# Patient Record
Sex: FEMALE | Race: WHITE | NOT HISPANIC OR LATINO | Employment: FULL TIME | URBAN - NONMETROPOLITAN AREA
[De-identification: names, ages, dates, MRNs, and addresses within clinical notes are randomized per-mention and may not be internally consistent; named-entity substitution may affect disease eponyms.]

---

## 2017-03-06 ENCOUNTER — OFFICE VISIT (OUTPATIENT)
Dept: RETAIL CLINIC | Facility: CLINIC | Age: 32
End: 2017-03-06

## 2017-03-06 VITALS
WEIGHT: 219 LBS | TEMPERATURE: 98.5 F | DIASTOLIC BLOOD PRESSURE: 92 MMHG | OXYGEN SATURATION: 98 % | SYSTOLIC BLOOD PRESSURE: 130 MMHG | HEART RATE: 94 BPM | RESPIRATION RATE: 18 BRPM

## 2017-03-06 DIAGNOSIS — J02.9 ACUTE PHARYNGITIS, UNSPECIFIED ETIOLOGY: Primary | ICD-10-CM

## 2017-03-06 LAB
EXPIRATION DATE: NORMAL
INTERNAL CONTROL: NORMAL
Lab: NORMAL
S PYO AG THROAT QL: NEGATIVE

## 2017-03-06 PROCEDURE — 99213 OFFICE O/P EST LOW 20 MIN: CPT | Performed by: NURSE PRACTITIONER

## 2017-03-06 PROCEDURE — 87880 STREP A ASSAY W/OPTIC: CPT | Performed by: NURSE PRACTITIONER

## 2017-03-06 RX ORDER — AMOXICILLIN 875 MG/1
875 TABLET, COATED ORAL 2 TIMES DAILY
Qty: 20 TABLET | Refills: 0 | Status: SHIPPED | OUTPATIENT
Start: 2017-03-06 | End: 2017-03-16

## 2017-03-06 NOTE — PATIENT INSTRUCTIONS
Sore Throat  A sore throat is pain, burning, irritation, or scratchiness of the throat. There is often pain or tenderness when swallowing or talking. A sore throat may be accompanied by other symptoms, such as coughing, sneezing, fever, and swollen neck glands. A sore throat is often the first sign of another sickness, such as a cold, flu, strep throat, or mononucleosis (commonly known as mono). Most sore throats go away without medical treatment.  CAUSES   The most common causes of a sore throat include:  · A viral infection, such as a cold, flu, or mono.  · A bacterial infection, such as strep throat, tonsillitis, or whooping cough.  · Seasonal allergies.  · Dryness in the air.  · Irritants, such as smoke or pollution.  · Gastroesophageal reflux disease (GERD).  HOME CARE INSTRUCTIONS   · Only take over-the-counter medicines as directed by your caregiver.  · Drink enough fluids to keep your urine clear or pale yellow.  · Rest as needed.  · Try using throat sprays, lozenges, or sucking on hard candy to ease any pain (if older than 4 years or as directed).  · Sip warm liquids, such as broth, herbal tea, or warm water with honey to relieve pain temporarily. You may also eat or drink cold or frozen liquids such as frozen ice pops.  · Gargle with salt water (mix 1 tsp salt with 8 oz of water).  · Do not smoke and avoid secondhand smoke.  · Put a cool-mist humidifier in your bedroom at night to moisten the air. You can also turn on a hot shower and sit in the bathroom with the door closed for 5-10 minutes.  SEEK IMMEDIATE MEDICAL CARE IF:  · You have difficulty breathing.  · You are unable to swallow fluids, soft foods, or your saliva.  · You have increased swelling in the throat.  · Your sore throat does not get better in 7 days.  · You have nausea and vomiting.  · You have a fever or persistent symptoms for more than 2-3 days.  · You have a fever and your symptoms suddenly get worse.  MAKE SURE YOU:   · Understand  these instructions.  · Will watch your condition.  · Will get help right away if you are not doing well or get worse.     This information is not intended to replace advice given to you by your health care provider. Make sure you discuss any questions you have with your health care provider.     Document Released: 01/25/2006 Document Revised: 01/08/2016 Document Reviewed: 10/07/2016  [employer] Patient Education ©2016 [employer]

## 2017-03-06 NOTE — PROGRESS NOTES
Karla Plunkett is a 31 y.o. female who presents to the clinic with:      Sore Throat    This is a new problem. The current episode started in the past 7 days. The problem has been gradually worsening. There has been no fever. Associated symptoms include headaches and vomiting. Pertinent negatives include no abdominal pain, congestion, coughing, diarrhea, ear discharge, ear pain or shortness of breath. She has had exposure to strep. She has had no exposure to mono. She has tried nothing for the symptoms.   Vomiting    This is a new problem. The current episode started in the past 7 days. The problem occurs less than 2 times per day. The problem has been resolved. Associated symptoms include headaches. Pertinent negatives include no abdominal pain, coughing or diarrhea.          The following portions of the patient's history were reviewed and updated as appropriate: allergies, current medications, past family history, past medical history, past social history, past surgical history and problem list.       Review of Systems   Constitutional: Negative.    HENT: Positive for sore throat. Negative for congestion, ear discharge, ear pain, postnasal drip and sinus pressure.    Eyes: Negative.    Respiratory: Negative for cough and shortness of breath.    Cardiovascular: Negative.    Gastrointestinal: Positive for vomiting. Negative for abdominal pain and diarrhea.   Endocrine: Negative.    Genitourinary: Negative.    Musculoskeletal: Negative.    Skin: Negative.    Allergic/Immunologic: Negative.    Neurological: Positive for headaches.   Hematological: Negative.    Psychiatric/Behavioral: Negative.          Objective    Blood pressure 130/92, pulse 94, temperature 98.5 °F (36.9 °C), temperature source Temporal Artery , resp. rate 18, weight 219 lb (99.3 kg), last menstrual period 03/04/2017, SpO2 98 %.      Physical Exam   Constitutional: She is oriented to person, place, and time. She appears well-developed  and well-nourished.   HENT:   Right Ear: External ear and ear canal normal. Tympanic membrane is not erythematous. No middle ear effusion.   Left Ear: External ear and ear canal normal. Tympanic membrane is not erythematous.  No middle ear effusion.   Nose: Right sinus exhibits no maxillary sinus tenderness and no frontal sinus tenderness. Left sinus exhibits no maxillary sinus tenderness and no frontal sinus tenderness.   Mouth/Throat: Uvula is midline. Oropharyngeal exudate and posterior oropharyngeal erythema present.   Eyes: Conjunctivae and lids are normal. Right eye exhibits no discharge. Left eye exhibits no discharge.   Cardiovascular: Normal rate, regular rhythm and normal heart sounds.    Pulmonary/Chest: Breath sounds normal. No respiratory distress. She has no decreased breath sounds. She has no wheezes. She has no rhonchi. She has no rales.   Abdominal: Soft. Normal appearance and bowel sounds are normal.   Lymphadenopathy:        Head (right side): No tonsillar, no preauricular and no posterior auricular adenopathy present.        Head (left side): No tonsillar, no preauricular and no posterior auricular adenopathy present.     She has no cervical adenopathy.   Neurological: She is alert and oriented to person, place, and time.   Skin: Skin is warm and dry.   Psychiatric: She has a normal mood and affect.         Assessment/Plan   Lottie was seen today for sore throat and vomiting.    Diagnoses and all orders for this visit:    Acute pharyngitis, unspecified etiology  -     POCT rapid strep A  -     amoxicillin (AMOXIL) 875 MG tablet; Take 1 tablet by mouth 2 (Two) Times a Day for 10 days.

## 2018-01-01 ENCOUNTER — OFFICE VISIT (OUTPATIENT)
Dept: RETAIL CLINIC | Facility: CLINIC | Age: 33
End: 2018-01-01

## 2018-01-01 VITALS
HEART RATE: 93 BPM | WEIGHT: 228.6 LBS | RESPIRATION RATE: 18 BRPM | BODY MASS INDEX: 40.5 KG/M2 | HEIGHT: 63 IN | SYSTOLIC BLOOD PRESSURE: 122 MMHG | OXYGEN SATURATION: 98 % | DIASTOLIC BLOOD PRESSURE: 88 MMHG | TEMPERATURE: 98.3 F

## 2018-01-01 DIAGNOSIS — R21 RASH OF FACE: ICD-10-CM

## 2018-01-01 DIAGNOSIS — J02.9 ACUTE PHARYNGITIS, UNSPECIFIED ETIOLOGY: Primary | ICD-10-CM

## 2018-01-01 DIAGNOSIS — J06.9 ACUTE URI: ICD-10-CM

## 2018-01-01 LAB
EXPIRATION DATE: NORMAL
INTERNAL CONTROL: NORMAL
Lab: NORMAL
S PYO AG THROAT QL: NEGATIVE

## 2018-01-01 PROCEDURE — 96372 THER/PROPH/DIAG INJ SC/IM: CPT | Performed by: NURSE PRACTITIONER

## 2018-01-01 PROCEDURE — 87880 STREP A ASSAY W/OPTIC: CPT | Performed by: NURSE PRACTITIONER

## 2018-01-01 PROCEDURE — 99213 OFFICE O/P EST LOW 20 MIN: CPT | Performed by: NURSE PRACTITIONER

## 2018-01-01 RX ORDER — DEXAMETHASONE SODIUM PHOSPHATE 4 MG/ML
8 INJECTION, SOLUTION INTRA-ARTICULAR; INTRALESIONAL; INTRAMUSCULAR; INTRAVENOUS; SOFT TISSUE ONCE
Status: COMPLETED | OUTPATIENT
Start: 2018-01-01 | End: 2018-01-01

## 2018-01-01 RX ADMIN — DEXAMETHASONE SODIUM PHOSPHATE 8 MG: 4 INJECTION, SOLUTION INTRA-ARTICULAR; INTRALESIONAL; INTRAMUSCULAR; INTRAVENOUS; SOFT TISSUE at 10:45

## 2018-01-01 NOTE — PROGRESS NOTES
Chief Complaint   Patient presents with   • Sinus Problem   • Sore Throat     Subjective   Lottie Plunkett is a 32 y.o. female who presents to the clinic today with complaints of sore throat and pain with swallowing. She also has bilateral ear pain and the right one feels muffled.  She has had a frontal headache.  Her symptoms started about five days ago. She thought maybe she had an allergic reaction. She noted some itchy eyes which improved and fine rash to her face. She denies any new products, meds, etc.  She has tried taking Mucinex Fast Max, Benadryl, Theraflu, Tylenol, Naproxen, Dayquil and Nyquil without much benefit.   HPI    Current Outpatient Prescriptions:   •  ACETAMINOPHEN PO, Take  by mouth As Needed (last taken at 10 p.m. on last night)., Disp: , Rfl:   No current facility-administered medications for this visit.     Allergies:  Review of patient's allergies indicates no known allergies.    Past Medical History:   Diagnosis Date   • Allergic    • Anxiety    • Arthritis    • Chronic pain disorder     TWO BACK SURGERY'S   • H/O streptococcal infection    • Urinary tract infection      Past Surgical History:   Procedure Laterality Date   •  SECTION  2003   • SPINE SURGERY      Lumbar Laminectomy (12), Lumbar Disc Replacement (2016)     Family History   Problem Relation Age of Onset   • Obesity Mother    • Cancer Father      Social History   Substance Use Topics   • Smoking status: Current Every Day Smoker     Packs/day: 0.50     Years: 11.00     Types: Cigarettes   • Smokeless tobacco: Never Used   • Alcohol use 1.2 oz/week     2 Cans of beer per week      Comment: 1-2 DAILY       Review of Systems  Review of Systems   Constitutional: Negative for chills and fever.   HENT: Positive for ear pain (bilateral), postnasal drip and sore throat. Negative for rhinorrhea, sinus pain, sneezing and trouble swallowing.    Eyes: Positive for itching. Negative for photophobia, pain,  "discharge, redness and visual disturbance.   Respiratory: Negative for cough, shortness of breath and wheezing.    Gastrointestinal: Positive for vomiting (first thing in the morning, mucous). Negative for abdominal pain, constipation, diarrhea and nausea.   Musculoskeletal: Negative for arthralgias.   Skin: Positive for rash.   Neurological: Positive for headaches.       Objective   /88 (BP Location: Left arm, Patient Position: Sitting, Cuff Size: Adult)  Pulse 93  Temp 98.3 °F (36.8 °C) (Oral)   Resp 18  Ht 160 cm (63\")  Wt 104 kg (228 lb 9.6 oz)  LMP 12/25/2017 (Approximate)  SpO2 98%  BMI 40.49 kg/m2      Physical Exam   Constitutional: She is oriented to person, place, and time. She appears well-developed and well-nourished. She is cooperative. She appears ill (mildly). No distress.   HENT:   Head: Normocephalic and atraumatic.   Right Ear: Tympanic membrane, external ear and ear canal normal. Tympanic membrane is not perforated, not erythematous, not retracted and not bulging.   Left Ear: Tympanic membrane, external ear and ear canal normal. Tympanic membrane is not perforated, not erythematous, not retracted and not bulging.   Nose: Nose normal. Right sinus exhibits no maxillary sinus tenderness and no frontal sinus tenderness. Left sinus exhibits no maxillary sinus tenderness and no frontal sinus tenderness.   Mouth/Throat: Uvula is midline and mucous membranes are normal. Posterior oropharyngeal erythema present. Tonsils are 2+ on the right. Tonsils are 2+ on the left. No tonsillar exudate.   Eyes: Conjunctivae, EOM and lids are normal. Pupils are equal, round, and reactive to light.   Neck: Trachea normal and normal range of motion. Neck supple.   Cardiovascular: Normal rate, regular rhythm, S1 normal, S2 normal and normal heart sounds.    Pulmonary/Chest: Effort normal and breath sounds normal. She has no wheezes. She has no rhonchi. She has no rales.   Abdominal: Normal appearance and bowel " "sounds are normal. There is no tenderness.   Lymphadenopathy:     She has cervical adenopathy (soft mobile tender bilateral anterior nodes palpable).   Neurological: She is alert and oriented to person, place, and time. Coordination and gait normal.   Skin: Skin is warm, dry and intact. Rash (fine tiny erythemic papules noted to face cheeks near nose bilaterally. No s/s of infection) noted.   Psychiatric: She has a normal mood and affect. Her speech is normal and behavior is normal.       Assessment/Plan     Lottie was seen today for sinus problem and sore throat.    Diagnoses and all orders for this visit:    Acute pharyngitis, unspecified etiology  -     POC Rapid Strep A- negative  -     dexamethasone (DECADRON) injection 8 mg; Inject 2 mL into the shoulder, thigh, or buttocks 1 (One) Time.    Acute URI    Rash of face  -     dexamethasone (DECADRON) injection 8 mg; Inject 2 mL into the shoulder, thigh, or buttocks 1 (One) Time.    \"Wait and see\" prescription for Amoxicillin 875mg po bid x 10 days given. She agreed to follow instructions.  Continue Mucinex and Tylenol as needed. Increase fluid intake. Risks and benefits of steroid injection discussed prior to administration.  You report having a Medrol dose pack at home that you can start taking tomorrow if needed as per package directions. (You declined to have a new prescription today.)     Follow up with PCP or urgent care if symptoms persist or worsen.   "

## 2018-01-01 NOTE — PATIENT INSTRUCTIONS
Pharyngitis  Pharyngitis is redness, pain, and swelling (inflammation) of your pharynx.   CAUSES   Pharyngitis is usually caused by infection. Most of the time, these infections are from viruses (viral) and are part of a cold. However, sometimes pharyngitis is caused by bacteria (bacterial). Pharyngitis can also be caused by allergies. Viral pharyngitis may be spread from person to person by coughing, sneezing, and personal items or utensils (cups, forks, spoons, toothbrushes). Bacterial pharyngitis may be spread from person to person by more intimate contact, such as kissing.   SIGNS AND SYMPTOMS   Symptoms of pharyngitis include:    · Sore throat.    · Tiredness (fatigue).    · Low-grade fever.    · Headache.  · Joint pain and muscle aches.  · Skin rashes.  · Swollen lymph nodes.  · Plaque-like film on throat or tonsils (often seen with bacterial pharyngitis).  DIAGNOSIS   Your health care provider will ask you questions about your illness and your symptoms. Your medical history, along with a physical exam, is often all that is needed to diagnose pharyngitis. Sometimes, a rapid strep test is done. Other lab tests may also be done, depending on the suspected cause.   TREATMENT   Viral pharyngitis will usually get better in 3-4 days without the use of medicine. Bacterial pharyngitis is treated with medicines that kill germs (antibiotics).   HOME CARE INSTRUCTIONS   · Drink enough water and fluids to keep your urine clear or pale yellow.    · Only take over-the-counter or prescription medicines as directed by your health care provider:      If you are prescribed antibiotics, make sure you finish them even if you start to feel better.      Do not take aspirin.    · Get lots of rest.    · Gargle with 8 oz of salt water (½ tsp of salt per 1 qt of water) as often as every 1-2 hours to soothe your throat.    · Throat lozenges (if you are not at risk for choking) or sprays may be used to soothe your throat.  SEEK MEDICAL  "CARE IF:   · You have large, tender lumps in your neck.  · You have a rash.  · You cough up green, yellow-brown, or bloody spit.  SEEK IMMEDIATE MEDICAL CARE IF:   · Your neck becomes stiff.  · You drool or are unable to swallow liquids.  · You vomit or are unable to keep medicines or liquids down.  · You have severe pain that does not go away with the use of recommended medicines.  · You have trouble breathing (not caused by a stuffy nose).  MAKE SURE YOU:   · Understand these instructions.  · Will watch your condition.  · Will get help right away if you are not doing well or get worse.     This information is not intended to replace advice given to you by your health care provider. Make sure you discuss any questions you have with your health care provider.     Document Released: 12/18/2006 Document Revised: 10/08/2014 Document Reviewed: 08/25/2014  [employer] Interactive Patient Education ©2017 [employer]      Upper Respiratory Infection, Adult  Most upper respiratory infections (URIs) are a viral infection of the air passages leading to the lungs. A URI affects the nose, throat, and upper air passages. The most common type of URI is nasopharyngitis and is typically referred to as \"the common cold.\"  URIs run their course and usually go away on their own. Most of the time, a URI does not require medical attention, but sometimes a bacterial infection in the upper airways can follow a viral infection. This is called a secondary infection. Sinus and middle ear infections are common types of secondary upper respiratory infections.  Bacterial pneumonia can also complicate a URI. A URI can worsen asthma and chronic obstructive pulmonary disease (COPD). Sometimes, these complications can require emergency medical care and may be life threatening.   CAUSES  Almost all URIs are caused by viruses. A virus is a type of germ and can spread from one person to another.   RISKS FACTORS  You may be at risk for a URI if:   · You " smoke.    · You have chronic heart or lung disease.  · You have a weakened defense (immune) system.    · You are very young or very old.    · You have nasal allergies or asthma.  · You work in crowded or poorly ventilated areas.  · You work in health care facilities or schools.  SIGNS AND SYMPTOMS   Symptoms typically develop 2-3 days after you come in contact with a cold virus. Most viral URIs last 7-10 days. However, viral URIs from the influenza virus (flu virus) can last 14-18 days and are typically more severe. Symptoms may include:   · Runny or stuffy (congested) nose.    · Sneezing.    · Cough.    · Sore throat.    · Headache.    · Fatigue.    · Fever.    · Loss of appetite.    · Pain in your forehead, behind your eyes, and over your cheekbones (sinus pain).  · Muscle aches.    DIAGNOSIS   Your health care provider may diagnose a URI by:  · Physical exam.  · Tests to check that your symptoms are not due to another condition such as:  ¨ Strep throat.  ¨ Sinusitis.  ¨ Pneumonia.  ¨ Asthma.  TREATMENT   A URI goes away on its own with time. It cannot be cured with medicines, but medicines may be prescribed or recommended to relieve symptoms. Medicines may help:  · Reduce your fever.  · Reduce your cough.  · Relieve nasal congestion.  HOME CARE INSTRUCTIONS   · Take medicines only as directed by your health care provider.    · Gargle warm saltwater or take cough drops to comfort your throat as directed by your health care provider.  · Use a warm mist humidifier or inhale steam from a shower to increase air moisture. This may make it easier to breathe.  · Drink enough fluid to keep your urine clear or pale yellow.    · Eat soups and other clear broths and maintain good nutrition.    · Rest as needed.    · Return to work when your temperature has returned to normal or as your health care provider advises. You may need to stay home longer to avoid infecting others. You can also use a face mask and careful hand  washing to prevent spread of the virus.  · Increase the usage of your inhaler if you have asthma.    · Do not use any tobacco products, including cigarettes, chewing tobacco, or electronic cigarettes. If you need help quitting, ask your health care provider.  PREVENTION   The best way to protect yourself from getting a cold is to practice good hygiene.   · Avoid oral or hand contact with people with cold symptoms.    · Wash your hands often if contact occurs.    There is no clear evidence that vitamin C, vitamin E, echinacea, or exercise reduces the chance of developing a cold. However, it is always recommended to get plenty of rest, exercise, and practice good nutrition.   SEEK MEDICAL CARE IF:   · You are getting worse rather than better.    · Your symptoms are not controlled by medicine.    · You have chills.  · You have worsening shortness of breath.  · You have brown or red mucus.  · You have yellow or brown nasal discharge.  · You have pain in your face, especially when you bend forward.  · You have a fever.  · You have swollen neck glands.  · You have pain while swallowing.  · You have white areas in the back of your throat.  SEEK IMMEDIATE MEDICAL CARE IF:   · You have severe or persistent:    Headache.    Ear pain.    Sinus pain.    Chest pain.  · You have chronic lung disease and any of the following:    Wheezing.    Prolonged cough.    Coughing up blood.    A change in your usual mucus.  · You have a stiff neck.  · You have changes in your:    Vision.    Hearing.    Thinking.    Mood.  MAKE SURE YOU:   · Understand these instructions.  · Will watch your condition.  · Will get help right away if you are not doing well or get worse.     This information is not intended to replace advice given to you by your health care provider. Make sure you discuss any questions you have with your health care provider.     Document Released: 06/13/2002 Document Revised: 05/03/2016 Document Reviewed: 03/25/2015  [employer]  Interactive Patient Education ©2017 [employer]      Rash  A rash is a change in the color of the skin. A rash can also change the way your skin feels. There are many different conditions and factors that can cause a rash.  HOME CARE  Pay attention to any changes in your symptoms. Follow these instructions to help with your condition:  Medicine   Take or apply over-the-counter and prescription medicines only as told by your doctor. These may include:  · Corticosteroid cream.  · Anti-itch lotions.  · Oral antihistamines.  Skin Care   · Put cool compresses on the affected areas.  · Try taking a bath with:    Epsom salts. Follow the instructions on the packaging. You can get these at your local pharmacy or grocery store.    Baking soda. Pour a small amount into the bath as told by your doctor.    Colloidal oatmeal. Follow the instructions on the packaging. You can get this at your local pharmacy or grocery store.  · Try putting baking soda paste onto your skin. Stir water into baking soda until it gets like a paste.  · Do not scratch or rub your skin.  · Avoid covering the rash. Make sure the rash is exposed to air as much as possible.  General Instructions   · Avoid hot showers or baths, which can make itching worse. A cold shower may help.  · Avoid scented soaps, detergents, and perfumes. Use gentle soaps, detergents, perfumes, and other cosmetic products.  · Avoid anything that causes your rash. Keep a journal to help track what causes your rash. Write down:    What you eat.    What cosmetic products you use.    What you drink.    What you wear. This includes jewelry.  · Keep all follow-up visits as told by your doctor. This is important.  GET HELP IF:  · You sweat at night.  · You lose weight.  · You pee (urinate) more than normal.  · You feel weak.  · You throw up (vomit).  · Your skin or the whites of your eyes look yellow (jaundice).  · Your skin:    Tingles.    Is numb.  · Your rash:    Does not go away after  "a few days.    Gets worse.  · You are:    More thirsty than normal.    More tired than normal.  · You have:    New symptoms.    Pain in your belly (abdomen).    A fever.    Watery poop (diarrhea).  GET HELP RIGHT AWAY IF:  · Your rash covers all or most of your body. The rash may or may not be painful.  · You have blisters that:    Are on top of the rash.    Grow larger.    Grow together.    Are painful.    Are inside your nose or mouth.  · You have a rash that:    Looks like purple pinprick-sized spots all over your body.    Has a \"bull's eye\" or looks like a target.    Is red and painful, causes your skin to peel, and is not from being in the sun too long.     This information is not intended to replace advice given to you by your health care provider. Make sure you discuss any questions you have with your health care provider.     Document Released: 06/05/2009 Document Revised: 04/10/2017 Document Reviewed: 05/04/2016  [employer] Interactive Patient Education ©2017 [employer]    Dexamethasone injection  What is this medicine?  DEXAMETHASONE (dex a METH a sone) is a corticosteroid. It is used to treat inflammation of the skin, joints, lungs, and other organs. Common conditions treated include asthma, allergies, and arthritis. It is also used for other conditions, like blood disorders and diseases of the adrenal glands.  This medicine may be used for other purposes; ask your health care provider or pharmacist if you have questions.  COMMON BRAND NAME(S): Decadron, DoubleDex, Simplist Dexamethasone, Solurex  What should I tell my health care provider before I take this medicine?  They need to know if you have any of these conditions:  -blood clotting problems  -Cushing's syndrome  -diabetes  -glaucoma  -heart problems or disease  -high blood pressure  -infection like herpes, measles, tuberculosis, or chickenpox  -kidney disease  -liver disease  -mental problems  -myasthenia gravis  -osteoporosis  -previous heart " attack  -seizures  -stomach, ulcer or intestine disease including colitis and diverticulitis  -thyroid problem  -an unusual or allergic reaction to dexamethasone, corticosteroids, other medicines, lactose, foods, dyes, or preservatives  -pregnant or trying to get pregnant  -breast-feeding  How should I use this medicine?  This medicine is for injection into a muscle, joint, lesion, soft tissue, or vein. It is given by a health care professional in a hospital or clinic setting.  Talk to your pediatrician regarding the use of this medicine in children. Special care may be needed.  Overdosage: If you think you have taken too much of this medicine contact a poison control center or emergency room at once.  NOTE: This medicine is only for you. Do not share this medicine with others.  What if I miss a dose?  This may not apply. If you are having a series of injections over a prolonged period, try not to miss an appointment. Call your doctor or health care professional to reschedule if you are unable to keep an appointment.  What may interact with this medicine?  Do not take this medicine with any of the following medications:  -mifepristone, RU-486  -vaccines  This medicine may also interact with the following medications:  -amphotericin B  -antibiotics like clarithromycin, erythromycin, and troleandomycin  -aspirin and aspirin-like drugs  -barbiturates like phenobarbital  -carbamazepine  -cholestyramine  -cholinesterase inhibitors like donepezil, galantamine, rivastigmine, and tacrine  -cyclosporine  -digoxin  -diuretics  -ephedrine  -female hormones, like estrogens or progestins and birth control pills  -indinavir  -isoniazid  -ketoconazole  -medicines for diabetes  -medicines that improve muscle tone or strength for conditions like myasthenia gravis  -NSAIDs, medicines for pain and inflammation, like ibuprofen or naproxen  -phenytoin  -rifampin  -thalidomide  -warfarin  This list may not describe all possible  interactions. Give your health care provider a list of all the medicines, herbs, non-prescription drugs, or dietary supplements you use. Also tell them if you smoke, drink alcohol, or use illegal drugs. Some items may interact with your medicine.  What should I watch for while using this medicine?  Your condition will be monitored carefully while you are receiving this medicine.  If you are taking this medicine for a long time, carry an identification card with your name and address, the type and dose of your medicine, and your doctor's name and address.  This medicine may increase your risk of getting an infection. Stay away from people who are sick. Tell your doctor or health care professional if you are around anyone with measles or chickenpox. Talk to your health care provider before you get any vaccines that you take this medicine.  If you are going to have surgery, tell your doctor or health care professional that you have taken this medicine within the last twelve months.  Ask your doctor or health care professional about your diet. You may need to lower the amount of salt you eat.  The medicine can increase your blood sugar. If you are a diabetic check with your doctor if you need help adjusting the dose of your diabetic medicine.  What side effects may I notice from receiving this medicine?  Side effects that you should report to your doctor or health care professional as soon as possible:  -allergic reactions like skin rash, itching or hives, swelling of the face, lips, or tongue  -black or tarry stools  -change in the amount of urine  -changes in vision  -confusion, excitement, restlessness, a false sense of well-being  -fever, sore throat, sneezing, cough, or other signs of infection, wounds that will not heal  -hallucinations  -increased thirst  -mental depression, mood swings, mistaken feelings of self importance or of being mistreated  -pain in hips, back, ribs, arms, shoulders, or legs  -pain,  redness, or irritation at the injection site  -redness, blistering, peeling or loosening of the skin, including inside the mouth  -rounding out of face  -swelling of feet or lower legs  -unusual bleeding or bruising  -unusual tired or weak  -wounds that do not heal  Side effects that usually do not require medical attention (report to your doctor or health care professional if they continue or are bothersome):  -diarrhea or constipation  -change in taste  -headache  -nausea, vomiting  -skin problems, acne, thin and shiny skin  -touble sleeping  -unusual growth of hair on the face or body  -weight gain  This list may not describe all possible side effects. Call your doctor for medical advice about side effects. You may report side effects to FDA at 6-014-FDA-2677.  Where should I keep my medicine?  This drug is given in a hospital or clinic and will not be stored at home.  NOTE: This sheet is a summary. It may not cover all possible information. If you have questions about this medicine, talk to your doctor, pharmacist, or health care provider.     © 2017, [employer]. (2009-04-09 14:04:12)    Continue Mucinex and Tylenol as needed. Increase fluid intake. Risks and benefits of steroid injection discussed prior to administration.  You report having a Medrol dose pack at home that you can start taking tomorrow if needed as per package directions. (You declined to have a new prescription today.)  Based on your examination today you are being given a paper prescription for an antibiotic. You have agreed to follow instructions as discussed for self-care at home.  If your symptoms do not improve or if an increase in symptoms occurs over next 48-72 hours, you are to start the antibiotic.  If you are significantly worse you will need to be seen by a healthcare provider.     If you improve please shred the prescription and throw it away.  Do not save it for another illness.  Antibiotics are medications that need to  be taken as directed, for the length of time directed and only if prescribed for a specific illness by a healthcare provider.  Antibiotics fight bacterial infections only. They will not be effective in viral illnesses. Exposure to an antibiotic unnecessarily can make them ineffective when you need them in the future. You verbalized understanding of these instructions and agreed to follow them.

## 2018-01-09 ENCOUNTER — OFFICE VISIT (OUTPATIENT)
Dept: RETAIL CLINIC | Facility: CLINIC | Age: 33
End: 2018-01-09

## 2018-01-09 VITALS
BODY MASS INDEX: 40.39 KG/M2 | HEART RATE: 104 BPM | TEMPERATURE: 98 F | DIASTOLIC BLOOD PRESSURE: 88 MMHG | SYSTOLIC BLOOD PRESSURE: 122 MMHG | WEIGHT: 228 LBS | RESPIRATION RATE: 22 BRPM | OXYGEN SATURATION: 98 %

## 2018-01-09 DIAGNOSIS — J20.9 ACUTE BRONCHITIS, UNSPECIFIED ORGANISM: Primary | ICD-10-CM

## 2018-01-09 PROCEDURE — 99213 OFFICE O/P EST LOW 20 MIN: CPT | Performed by: NURSE PRACTITIONER

## 2018-01-09 RX ORDER — BROMPHENIRAMINE MALEATE, PSEUDOEPHEDRINE HYDROCHLORIDE, AND DEXTROMETHORPHAN HYDROBROMIDE 2; 30; 10 MG/5ML; MG/5ML; MG/5ML
10 SYRUP ORAL 4 TIMES DAILY PRN
Qty: 240 ML | Refills: 0 | Status: SHIPPED | OUTPATIENT
Start: 2018-01-09 | End: 2022-03-28

## 2018-01-09 RX ORDER — ALBUTEROL SULFATE 90 UG/1
2 AEROSOL, METERED RESPIRATORY (INHALATION) EVERY 4 HOURS PRN
Qty: 1 INHALER | Refills: 0 | Status: SHIPPED | OUTPATIENT
Start: 2018-01-09

## 2018-01-09 NOTE — PATIENT INSTRUCTIONS
Acute Bronchitis  Bronchitis is inflammation of the airways that extend from the windpipe into the lungs (bronchi). The inflammation often causes mucus to develop. This leads to a cough, which is the most common symptom of bronchitis.   In acute bronchitis, the condition usually develops suddenly and goes away over time, usually in a couple weeks. Smoking, allergies, and asthma can make bronchitis worse. Repeated episodes of bronchitis may cause further lung problems.   CAUSES  Acute bronchitis is most often caused by the same virus that causes a cold. The virus can spread from person to person (contagious) through coughing, sneezing, and touching contaminated objects.  SIGNS AND SYMPTOMS   · Cough.    · Fever.    · Coughing up mucus.    · Body aches.    · Chest congestion.    · Chills.    · Shortness of breath.    · Sore throat.    DIAGNOSIS   Acute bronchitis is usually diagnosed through a physical exam. Your health care provider will also ask you questions about your medical history. Tests, such as chest X-rays, are sometimes done to rule out other conditions.   TREATMENT   Acute bronchitis usually goes away in a couple weeks. Oftentimes, no medical treatment is necessary. Medicines are sometimes given for relief of fever or cough. Antibiotic medicines are usually not needed but may be prescribed in certain situations. In some cases, an inhaler may be recommended to help reduce shortness of breath and control the cough. A cool mist vaporizer may also be used to help thin bronchial secretions and make it easier to clear the chest.   HOME CARE INSTRUCTIONS  · Get plenty of rest.    · Drink enough fluids to keep your urine clear or pale yellow (unless you have a medical condition that requires fluid restriction). Increasing fluids may help thin your respiratory secretions (sputum) and reduce chest congestion, and it will prevent dehydration.    · Take medicines only as directed by your health care provider.  · If  you were prescribed an antibiotic medicine, finish it all even if you start to feel better.  · Avoid smoking and secondhand smoke. Exposure to cigarette smoke or irritating chemicals will make bronchitis worse. If you are a smoker, consider using nicotine gum or skin patches to help control withdrawal symptoms. Quitting smoking will help your lungs heal faster.    · Reduce the chances of another bout of acute bronchitis by washing your hands frequently, avoiding people with cold symptoms, and trying not to touch your hands to your mouth, nose, or eyes.    · Keep all follow-up visits as directed by your health care provider.    SEEK MEDICAL CARE IF:  Your symptoms do not improve after 1 week of treatment.   SEEK IMMEDIATE MEDICAL CARE IF:  · You develop an increased fever or chills.    · You have chest pain.    · You have severe shortness of breath.  · You have bloody sputum.    · You develop dehydration.  · You faint or repeatedly feel like you are going to pass out.  · You develop repeated vomiting.  · You develop a severe headache.  MAKE SURE YOU:   · Understand these instructions.  · Will watch your condition.  · Will get help right away if you are not doing well or get worse.     This information is not intended to replace advice given to you by your health care provider. Make sure you discuss any questions you have with your health care provider.     Document Released: 01/25/2006 Document Revised: 01/08/2016 Document Reviewed: 06/10/2014  [employer] Interactive Patient Education ©2017 [employer]    How to Use an Inhaler  Proper inhaler technique is very important. Good technique ensures that the medicine reaches the lungs. Poor technique results in depositing the medicine on the tongue and back of the throat rather than in the airways. If you do not use the inhaler with good technique, the medicine will not help you.  STEPS TO FOLLOW IF USING AN INHALER WITHOUT AN EXTENSION TUBE  1. Remove the cap from the  inhaler.  2. If you are using the inhaler for the first time, you will need to prime it. Shake the inhaler for 5 seconds and release four puffs into the air, away from your face. Ask your health care provider or pharmacist if you have questions about priming your inhaler.  3. Shake the inhaler for 5 seconds before each breath in (inhalation).  4. Position the inhaler so that the top of the canister faces up.  5. Put your index finger on the top of the medicine canister. Your thumb supports the bottom of the inhaler.  6. Open your mouth.  7. Either place the inhaler between your teeth and place your lips tightly around the mouthpiece, or hold the inhaler 1-2 inches away from your open mouth. If you are unsure of which technique to use, ask your health care provider.  8. Breathe out (exhale) normally and as completely as possible.  9. Press the canister down with your index finger to release the medicine.  10. At the same time as the canister is pressed, inhale deeply and slowly until your lungs are completely filled. This should take 4-6 seconds. Keep your tongue down.  11. Hold the medicine in your lungs for 5-10 seconds (10 seconds is best). This helps the medicine get into the small airways of your lungs.  12. Breathe out slowly, through pursed lips. Whistling is an example of pursed lips.  13. Wait at least 15-30 seconds between puffs. Continue with the above steps until you have taken the number of puffs your health care provider has ordered. Do not use the inhaler more than your health care provider tells you.  14. Replace the cap on the inhaler.  15. Follow the directions from your health care provider or the inhaler insert for cleaning the inhaler.  STEPS TO FOLLOW IF USING AN INHALER WITH AN EXTENSION (SPACER)  1. Remove the cap from the inhaler.  2. If you are using the inhaler for the first time, you will need to prime it. Shake the inhaler for 5 seconds and release four puffs into the air, away from your  face. Ask your health care provider or pharmacist if you have questions about priming your inhaler.  3. Shake the inhaler for 5 seconds before each breath in (inhalation).  4. Place the open end of the spacer onto the mouthpiece of the inhaler.  5. Position the inhaler so that the top of the canister faces up and the spacer mouthpiece faces you.  6. Put your index finger on the top of the medicine canister. Your thumb supports the bottom of the inhaler and the spacer.  7. Breathe out (exhale) normally and as completely as possible.  8. Immediately after exhaling, place the spacer between your teeth and into your mouth. Close your lips tightly around the spacer.  9. Press the canister down with your index finger to release the medicine.  10. At the same time as the canister is pressed, inhale deeply and slowly until your lungs are completely filled. This should take 4-6 seconds. Keep your tongue down and out of the way.  11. Hold the medicine in your lungs for 5-10 seconds (10 seconds is best). This helps the medicine get into the small airways of your lungs. Exhale.  12. Repeat inhaling deeply through the spacer mouthpiece. Again hold that breath for up to 10 seconds (10 seconds is best). Exhale slowly. If it is difficult to take this second deep breath through the spacer, breathe normally several times through the spacer. Remove the spacer from your mouth.  13. Wait at least 15-30 seconds between puffs. Continue with the above steps until you have taken the number of puffs your health care provider has ordered. Do not use the inhaler more than your health care provider tells you.  14. Remove the spacer from the inhaler, and place the cap on the inhaler.  15. Follow the directions from your health care provider or the inhaler insert for cleaning the inhaler and spacer.  If you are using different kinds of inhalers, use your quick relief medicine to open the airways 10-15 minutes before using a steroid if instructed  to do so by your health care provider. If you are unsure which inhalers to use and the order of using them, ask your health care provider, nurse, or respiratory therapist.  If you are using a steroid inhaler, always rinse your mouth with water after your last puff, then gargle and spit out the water. Do not swallow the water.  AVOID:  · Inhaling before or after starting the spray of medicine. It takes practice to coordinate your breathing with triggering the spray.  · Inhaling through the nose (rather than the mouth) when triggering the spray.  HOW TO DETERMINE IF YOUR INHALER IS FULL OR NEARLY EMPTY  You cannot know when an inhaler is empty by shaking it. A few inhalers are now being made with dose counters. Ask your health care provider for a prescription that has a dose counter if you feel you need that extra help. If your inhaler does not have a counter, ask your health care provider to help you determine the date you need to refill your inhaler. Write the refill date on a calendar or your inhaler canister. Refill your inhaler 7-10 days before it runs out. Be sure to keep an adequate supply of medicine. This includes making sure it is not , and that you have a spare inhaler.   SEEK MEDICAL CARE IF:   · Your symptoms are only partially relieved with your inhaler.  · You are having trouble using your inhaler.  · You have some increase in phlegm.  SEEK IMMEDIATE MEDICAL CARE IF:   · You feel little or no relief with your inhalers. You are still wheezing and are feeling shortness of breath or tightness in your chest or both.  · You have dizziness, headaches, or a fast heart rate.  · You have chills, fever, or night sweats.  · You have a noticeable increase in phlegm production, or there is blood in the phlegm.  MAKE SURE YOU:   · Understand these instructions.  · Will watch your condition.  · Will get help right away if you are not doing well or get worse.     This information is not intended to replace  advice given to you by your health care provider. Make sure you discuss any questions you have with your health care provider.     Document Released: 12/15/2001 Document Revised: 04/10/2017 Document Reviewed: 07/17/2014  [employer]vier Interactive Patient Education ©2017 [employer]

## 2018-01-09 NOTE — PROGRESS NOTES
Subjective   Lottie Plunkett is a 32 y.o. female.     HPI Comments: Treated on 1/1/2018, and has now been on the just in case Rx for 5 days; throat is improved, but cough has started to break up and worsen.    URI    This is a new problem. The current episode started 1 to 4 weeks ago. The problem has been gradually worsening. There has been no fever. Associated symptoms include chest pain (hurts to cough), congestion, coughing, rhinorrhea and wheezing. Pertinent negatives include no diarrhea, headaches, nausea, neck pain, sinus pain, sore throat or vomiting. Treatments tried: Amoxicillin; dexamethasone injection, medrol dose pack. The treatment provided mild relief.        The following portions of the patient's history were reviewed and updated as appropriate: allergies, current medications, past family history, past medical history, past social history, past surgical history and problem list.    Review of Systems   Constitutional: Positive for fatigue. Negative for chills and fever.   HENT: Positive for congestion and rhinorrhea. Negative for sinus pain and sore throat.    Respiratory: Positive for cough, shortness of breath and wheezing.    Cardiovascular: Positive for chest pain (hurts to cough).   Gastrointestinal: Negative for diarrhea, nausea and vomiting.   Musculoskeletal: Negative for neck pain.   Neurological: Negative for dizziness and headaches.       Objective      .    Physical Exam   Constitutional: She is oriented to person, place, and time. She appears well-developed and well-nourished.   HENT:   Head: Normocephalic and atraumatic.   Right Ear: Hearing, tympanic membrane, external ear and ear canal normal.   Left Ear: Hearing, tympanic membrane, external ear and ear canal normal.   Nose: Mucosal edema present.   Mouth/Throat: Posterior oropharyngeal erythema present.   Eyes: Conjunctivae and EOM are normal. Pupils are equal, round, and reactive to light.   Neck: Normal range of motion. Neck supple.  No thyromegaly present.   Cardiovascular: Normal rate, regular rhythm and normal heart sounds.  Exam reveals no gallop and no friction rub.    No murmur heard.  Pulmonary/Chest: Effort normal. No respiratory distress. She has decreased breath sounds. She has wheezes. She has rales.   Abdominal: Soft. Bowel sounds are normal. There is no tenderness.   Musculoskeletal: Normal range of motion.   Lymphadenopathy:     She has no cervical adenopathy.   Neurological: She is alert and oriented to person, place, and time. No cranial nerve deficit.   Skin: Skin is warm and dry.   Psychiatric: She has a normal mood and affect. Her behavior is normal. Thought content normal.   Nursing note and vitals reviewed.      Assessment/Plan   Lottie was seen today for uri.    Diagnoses and all orders for this visit:    Acute bronchitis, unspecified organism    Other orders  -     brompheniramine-pseudoephedrine-DM 30-2-10 MG/5ML syrup; Take 10 mL by mouth 4 (Four) Times a Day As Needed for Congestion or Cough.  -     albuterol (PROVENTIL HFA;VENTOLIN HFA) 108 (90 Base) MCG/ACT inhaler; Inhale 2 puffs Every 4 (Four) Hours As Needed for Wheezing or Shortness of Air.    Complete antibiotics. If fever develops, seek higher level of care for further evaluation.      SEEK IMMEDIATE MEDICAL CARE IF:  · You have increasing pain or severe headaches.  · You have nausea, vomiting, or drowsiness.  · You have swelling around your face.  · You have vision problems.  · You have a stiff neck.  · You have difficulty breathing.    Adequate rest and hydration, warm facial packs, and steam inhalation may be useful. Saline irrigation (Neti pot) or nasal saline spray may help with clearing congestion within the sinuses. Sleep with head of bed elevated. Avoid exposure to cigarette smoke or fumes.  For worsening or persistent problems, follow up with your primary care provider.  You have voiced understanding of treatment plan, visit summary provided.        America Judd, APRN

## 2018-01-11 ENCOUNTER — HOSPITAL ENCOUNTER (OUTPATIENT)
Dept: GENERAL RADIOLOGY | Age: 33
Discharge: HOME | End: 2018-01-11
Payer: COMMERCIAL

## 2018-01-11 ENCOUNTER — OFFICE VISIT (OUTPATIENT)
Dept: URGENT CARE | Age: 33
End: 2018-01-11
Payer: COMMERCIAL

## 2018-01-11 VITALS
HEART RATE: 94 BPM | OXYGEN SATURATION: 98 % | TEMPERATURE: 97.5 F | DIASTOLIC BLOOD PRESSURE: 62 MMHG | RESPIRATION RATE: 22 BRPM | BODY MASS INDEX: 40.21 KG/M2 | WEIGHT: 227 LBS | SYSTOLIC BLOOD PRESSURE: 120 MMHG

## 2018-01-11 DIAGNOSIS — R06.2 WHEEZE: ICD-10-CM

## 2018-01-11 DIAGNOSIS — R05.3 PERSISTENT COUGH: ICD-10-CM

## 2018-01-11 DIAGNOSIS — R05.3 PERSISTENT COUGH: Primary | ICD-10-CM

## 2018-01-11 PROCEDURE — 1036F TOBACCO NON-USER: CPT | Performed by: PHYSICIAN ASSISTANT

## 2018-01-11 PROCEDURE — G8417 CALC BMI ABV UP PARAM F/U: HCPCS | Performed by: PHYSICIAN ASSISTANT

## 2018-01-11 PROCEDURE — 99202 OFFICE O/P NEW SF 15 MIN: CPT | Performed by: PHYSICIAN ASSISTANT

## 2018-01-11 PROCEDURE — G8484 FLU IMMUNIZE NO ADMIN: HCPCS | Performed by: PHYSICIAN ASSISTANT

## 2018-01-11 PROCEDURE — G8427 DOCREV CUR MEDS BY ELIG CLIN: HCPCS | Performed by: PHYSICIAN ASSISTANT

## 2018-01-11 PROCEDURE — 71046 X-RAY EXAM CHEST 2 VIEWS: CPT

## 2018-01-11 RX ORDER — DEXTROMETHORPHAN HYDROBROMIDE AND PROMETHAZINE HYDROCHLORIDE 15; 6.25 MG/5ML; MG/5ML
5 SYRUP ORAL 4 TIMES DAILY PRN
Qty: 240 ML | Refills: 0 | Status: SHIPPED | OUTPATIENT
Start: 2018-01-11 | End: 2018-01-18

## 2018-01-11 RX ORDER — ALBUTEROL SULFATE 90 UG/1
2 AEROSOL, METERED RESPIRATORY (INHALATION)
COMMUNITY
Start: 2018-01-09 | End: 2018-02-26

## 2018-01-11 RX ORDER — AMOXICILLIN 875 MG/1
TABLET, COATED ORAL
Refills: 0 | COMMUNITY
Start: 2018-01-06 | End: 2018-02-26

## 2018-01-11 RX ORDER — BENZONATATE 100 MG/1
100 CAPSULE ORAL 3 TIMES DAILY PRN
Qty: 30 CAPSULE | Refills: 1 | Status: SHIPPED | OUTPATIENT
Start: 2018-01-11 | End: 2018-01-21

## 2018-01-11 RX ORDER — BROMPHENIRAMINE MALEATE, PSEUDOEPHEDRINE HYDROCHLORIDE, AND DEXTROMETHORPHAN HYDROBROMIDE 2; 30; 10 MG/5ML; MG/5ML; MG/5ML
10 SYRUP ORAL
COMMUNITY
Start: 2018-01-09 | End: 2018-02-26

## 2018-01-11 ASSESSMENT — ENCOUNTER SYMPTOMS
DIARRHEA: 0
SORE THROAT: 1
RHINORRHEA: 1
COUGH: 1
VOMITING: 0
ABDOMINAL PAIN: 0
SHORTNESS OF BREATH: 1
NAUSEA: 0

## 2018-01-11 NOTE — PATIENT INSTRUCTIONS
notice more mucus or a change in the color of your mucus. ? · You have new symptoms, such as a sore throat, an earache, or sinus pain. ? · You do not get better as expected. Where can you learn more? Go to [email or web address] and sign in to your Jas[employer] account. Enter D279 in the Pollfish box to learn more about \"Cough: Care Instructions. \"     If you do not have an account, please click on the \"Sign Up Now\" link. Current as of: May 12, 2017  Content Version: 11.5  © 1264-4320 [employer]. Care instructions adapted under license by [facility] ([facility]). If you have questions about a medical condition or this instruction, always ask your healthcare professional. [address] any warranty or liability for your use of this information.

## 2018-02-26 ENCOUNTER — OFFICE VISIT (OUTPATIENT)
Dept: INTERNAL MEDICINE | Age: 33
End: 2018-02-26
Payer: COMMERCIAL

## 2018-02-26 VITALS
OXYGEN SATURATION: 98 % | SYSTOLIC BLOOD PRESSURE: 118 MMHG | BODY MASS INDEX: 41.11 KG/M2 | WEIGHT: 232 LBS | TEMPERATURE: 98.5 F | HEART RATE: 89 BPM | HEIGHT: 63 IN | DIASTOLIC BLOOD PRESSURE: 70 MMHG

## 2018-02-26 DIAGNOSIS — J30.9 CHRONIC ALLERGIC RHINITIS, UNSPECIFIED SEASONALITY, UNSPECIFIED TRIGGER: ICD-10-CM

## 2018-02-26 DIAGNOSIS — Z71.3 DIETARY COUNSELING: ICD-10-CM

## 2018-02-26 DIAGNOSIS — F41.9 ANXIETY: Chronic | ICD-10-CM

## 2018-02-26 DIAGNOSIS — Z76.89 ENCOUNTER TO ESTABLISH CARE: Primary | ICD-10-CM

## 2018-02-26 DIAGNOSIS — Z76.89 ENCOUNTER TO ESTABLISH CARE: ICD-10-CM

## 2018-02-26 DIAGNOSIS — J06.9 ACUTE UPPER RESPIRATORY INFECTION: ICD-10-CM

## 2018-02-26 LAB
ALBUMIN SERPL-MCNC: 4.2 G/DL (ref 3.5–5.2)
ALP BLD-CCNC: 71 U/L (ref 35–104)
ALT SERPL-CCNC: 18 U/L (ref 5–33)
ANION GAP SERPL CALCULATED.3IONS-SCNC: 14 MMOL/L (ref 7–19)
AST SERPL-CCNC: 12 U/L (ref 5–32)
BASOPHILS ABSOLUTE: 0.1 K/UL (ref 0–0.2)
BASOPHILS RELATIVE PERCENT: 0.9 % (ref 0–1)
BILIRUB SERPL-MCNC: <0.2 MG/DL (ref 0.2–1.2)
BUN BLDV-MCNC: 11 MG/DL (ref 6–20)
CALCIUM SERPL-MCNC: 9.3 MG/DL (ref 8.6–10)
CHLORIDE BLD-SCNC: 101 MMOL/L (ref 98–111)
CHOLESTEROL, TOTAL: 221 MG/DL (ref 160–199)
CO2: 25 MMOL/L (ref 22–29)
CREAT SERPL-MCNC: 0.6 MG/DL (ref 0.5–0.9)
EOSINOPHILS ABSOLUTE: 0.1 K/UL (ref 0–0.6)
EOSINOPHILS RELATIVE PERCENT: 1.2 % (ref 0–5)
GFR NON-AFRICAN AMERICAN: >60
GLUCOSE BLD-MCNC: 113 MG/DL (ref 74–109)
HBA1C MFR BLD: 5.7 %
HCT VFR BLD CALC: 42.4 % (ref 37–47)
HDLC SERPL-MCNC: 58 MG/DL (ref 65–121)
HEMOGLOBIN: 13.3 G/DL (ref 12–16)
LDL CHOLESTEROL CALCULATED: 130 MG/DL
LYMPHOCYTES ABSOLUTE: 1.9 K/UL (ref 1.1–4.5)
LYMPHOCYTES RELATIVE PERCENT: 22.3 % (ref 20–40)
MCH RBC QN AUTO: 28.2 PG (ref 27–31)
MCHC RBC AUTO-ENTMCNC: 31.4 G/DL (ref 33–37)
MCV RBC AUTO: 90 FL (ref 81–99)
MONOCYTES ABSOLUTE: 0.5 K/UL (ref 0–0.9)
MONOCYTES RELATIVE PERCENT: 6 % (ref 0–10)
NEUTROPHILS ABSOLUTE: 5.9 K/UL (ref 1.5–7.5)
NEUTROPHILS RELATIVE PERCENT: 69.2 % (ref 50–65)
PDW BLD-RTO: 13.7 % (ref 11.5–14.5)
PLATELET # BLD: 374 K/UL (ref 130–400)
PMV BLD AUTO: 9.3 FL (ref 9.4–12.3)
POTASSIUM SERPL-SCNC: 4.7 MMOL/L (ref 3.5–5)
RBC # BLD: 4.71 M/UL (ref 4.2–5.4)
SODIUM BLD-SCNC: 140 MMOL/L (ref 136–145)
TOTAL PROTEIN: 7.9 G/DL (ref 6.6–8.7)
TRIGL SERPL-MCNC: 164 MG/DL (ref 0–149)
TSH SERPL DL<=0.05 MIU/L-ACNC: 2.27 UIU/ML (ref 0.27–4.2)
VITAMIN D 25-HYDROXY: 13.7 NG/ML
WBC # BLD: 8.5 K/UL (ref 4.8–10.8)

## 2018-02-26 PROCEDURE — 1036F TOBACCO NON-USER: CPT | Performed by: NURSE PRACTITIONER

## 2018-02-26 PROCEDURE — G8484 FLU IMMUNIZE NO ADMIN: HCPCS | Performed by: NURSE PRACTITIONER

## 2018-02-26 PROCEDURE — G8417 CALC BMI ABV UP PARAM F/U: HCPCS | Performed by: NURSE PRACTITIONER

## 2018-02-26 PROCEDURE — G8427 DOCREV CUR MEDS BY ELIG CLIN: HCPCS | Performed by: NURSE PRACTITIONER

## 2018-02-26 PROCEDURE — 99214 OFFICE O/P EST MOD 30 MIN: CPT | Performed by: NURSE PRACTITIONER

## 2018-02-26 RX ORDER — AZITHROMYCIN 250 MG/1
TABLET, FILM COATED ORAL
Qty: 1 PACKET | Refills: 0 | Status: SHIPPED | OUTPATIENT
Start: 2018-02-26 | End: 2018-03-12 | Stop reason: ALTCHOICE

## 2018-02-26 RX ORDER — ESCITALOPRAM OXALATE 10 MG/1
10 TABLET ORAL DAILY
Qty: 30 TABLET | Refills: 3 | Status: SHIPPED | OUTPATIENT
Start: 2018-02-26 | End: 2018-03-26 | Stop reason: SINTOL

## 2018-02-26 RX ORDER — METHYLPREDNISOLONE 4 MG/1
TABLET ORAL
Qty: 1 KIT | Refills: 0 | Status: SHIPPED | OUTPATIENT
Start: 2018-02-26 | End: 2018-03-04

## 2018-02-26 NOTE — PATIENT INSTRUCTIONS
1. Labs today. 2. Begin Lexapro 10 mg daily. 3. Begin zpack and medrol dose pack as directed. 4. Healthy diet and exercise. 5. Follow-up in 1 month with PAP. 6. Begin daily antihistamine such as zyrtec or claritin; take PLAIN Mucinex (no DM) to help thin secretions.

## 2018-02-26 NOTE — PROGRESS NOTES
[facility] INTERNAL MEDICINE  [address]  [address] [place] [address]  Dept: [phone]  Dept Fax: [phone]  Loc: [phone]    Mario Turcios is a 34 y.o. female who presents today for her medical conditions/complaints as noted below. Mario Turcios is c/o of Establish Care (Has had congestion/cough/sore throat since end of December) and Anxiety (Has had anxiety/depression for years and weight gain and wants to get back on medication)        HPI:     HPI   Chief Complaint   Patient presents with   [employer] Establish Care     Has had congestion/cough/sore throat since end of December    Anxiety     Has had anxiety/depression for years and weight gain and wants to get back on medication     Patient presents today to [facility]. She states she has not had a PCP in 4-5 years; she typically uses walk-in clinics when she is sick. She has a history of anxiety, OCD and back pain. She complains of cough, congestion and sore throat x 2 months. She has been taking Mucinex for symptoms. Patient also complains of anxiety and depression for years as well as weight gain. She states she is not certain what she has been on in the past; she rotated through many different medications. She states she has been off all medications for mood disorder for >10 years. A few months ago she feels like she hit a \"rough patch\" where she had difficulty getting out of bed; she is depressed about her weight and has struggled with many different meal plans and diets and has not been able to lose weight. She has been running, doing yoga and swimming without success. She has to be careful about the kind of exercise she does due to her back pain. Back pain has been well-controlled since her last surgery. It has been > 5 years since last pap; patient is due for this.  She has seen Dr Viky Hernandez in the past.     Past Medical History:   Diagnosis Date    Anxiety     Chronic back pain     OCD (obsessive compulsive disorder)       Past Surgical History:   Procedure Laterality Date     SECTION      LUMBAR DISCECTOMY      LUMBAR LAMINECTOMY         Vitals 2018    SYSTOLIC 669 [id number] - - 006   DIASTOLIC 70 62 - - 87   Site Left Arm - - - -   Position Sitting - - - -   Pulse 89 94 - - 130   Temp 98.5 97.5 - - 98.1   Resp - 22 - - 17   Weight 232 lb 227 lb - - 215 lb   Height 5' 3\" - - - 5' 3\"   BMI (wt*703/ht~2) 41.09 kg/m2 - - - 38.08 kg/m2   Pain Level - - 7 10 -       History reviewed. No pertinent family history. Social History   Substance Use Topics    Smoking status: Former Smoker     Packs/day: 0.10     Types: Cigarettes    Smokeless tobacco: Never Used    Alcohol use 4.8 oz/week     7 Cans of beer, 1 Shots of liquor per week      Current Outpatient Prescriptions   Medication Sig Dispense Refill    Dextromethorphan-Guaifenesin (MUCINEX DM MAXIMUM STRENGTH PO) Take by mouth      methylPREDNISolone (MEDROL, DOMINICK,) 4 MG tablet Take by mouth. 1 kit 0    azithromycin (ZITHROMAX) 250 MG tablet Take 2 tabs (500 mg) on Day 1, and take 1 tab (250 mg) on days 2 through 5. 1 packet 0    escitalopram (LEXAPRO) 10 MG tablet Take 1 tablet by mouth daily 30 tablet 3     No current facility-administered medications for this visit. No Known Allergies    Health Maintenance   Topic Date Due    HIV screen  1999    Cervical cancer screen  2005    Flu vaccine (1) 2018 (Originally 2017)    A1C test (Diabetic or Prediabetic)  2019    DTaP/Tdap/Td vaccine (2 - Td) 2022       Subjective:      Review of Systems   Constitutional: Negative for chills and fever. HENT: Positive for congestion. Negative for ear pain, hearing loss, rhinorrhea and voice change. Eyes: Negative for photophobia and visual disturbance. Respiratory: Positive for cough. Negative for shortness of breath.     Cardiovascular: Negative for chest pain and palpitations. Gastrointestinal: Negative for nausea and vomiting. Endocrine: Negative. Negative for cold intolerance and heat intolerance. Genitourinary: Negative for difficulty urinating and flank pain. Musculoskeletal: Negative for back pain and neck pain. Skin: Negative for color change and rash. Allergic/Immunologic: Negative for environmental allergies and food allergies. Neurological: Negative for dizziness, speech difficulty and headaches. Hematological: Does not bruise/bleed easily. Psychiatric/Behavioral: Negative for sleep disturbance and suicidal ideas. Objective:     Physical Exam   Constitutional: She is oriented to person, place, and time. She appears well-developed and well-nourished. HENT:   Head: Atraumatic. Right Ear: External ear normal.   Left Ear: External ear normal.   Nose: Nose normal.   Mouth/Throat: Oropharynx is clear and moist.   Eyes: Conjunctivae are normal. Pupils are equal, round, and reactive to light. Neck: Normal range of motion. Neck supple. Cardiovascular: Normal rate, regular rhythm, S1 normal, S2 normal and normal heart sounds. Pulmonary/Chest: Effort normal and breath sounds normal.   Abdominal: Soft. Bowel sounds are normal.   Musculoskeletal: Normal range of motion. Neurological: She is alert and oriented to person, place, and time. Skin: Skin is warm and dry. Psychiatric: She has a normal mood and affect. Her behavior is normal.   Nursing note and vitals reviewed. /70 (Site: Left Arm, Position: Sitting)   Pulse 89   Temp 98.5 °F (36.9 °C)   Ht 5' 3\" (1.6 m)   Wt 232 lb (105.2 kg)   SpO2 98%   BMI 41.10 kg/m²     Assessment:      1. Encounter to establish care  CBC Auto Differential    Comprehensive Metabolic Panel    Lipid Panel    Hemoglobin A1C    Vitamin D 25 Hydroxy    TSH without Reflex   2.  Anxiety  CBC Auto Differential    Comprehensive Metabolic Panel    Lipid Panel    Hemoglobin A1C    Vitamin D 25 Hydroxy    TSH without Reflex   3. Dietary counseling  CBC Auto Differential    Comprehensive Metabolic Panel    Lipid Panel    Hemoglobin A1C    Vitamin D 25 Hydroxy    TSH without Reflex   4. Acute upper respiratory infection  CBC Auto Differential    Comprehensive Metabolic Panel    Lipid Panel    Hemoglobin A1C    Vitamin D 25 Hydroxy    TSH without Reflex   5. Chronic allergic rhinitis, unspecified seasonality, unspecified trigger  CBC Auto Differential    Comprehensive Metabolic Panel    Lipid Panel    Hemoglobin A1C    Vitamin D 25 Hydroxy    TSH without Reflex       Plan:     We discussed coping mechanisms and addressed underlying issues. Continue relaxation techniques, stress management, and focused time to self. Patient has someone to talk with and support them. Use talent and skills to find things you enjoy as well as improve the quality of life. Time spent discussing anxiety: 30 min. 1. Labs today. 2. Begin Lexapro 10 mg daily. 3. Begin zpack and medrol dose pack as directed. 4. Healthy diet and exercise. 5. Follow-up in 1 month with PAP. 6. Begin daily antihistamine such as zyrtec or claritin; take PLAIN Mucinex (no DM) to help thin secretions. Patient given educational materials - see patient instructions. Discussed use, benefit, and side effects of prescribed medications. All patient questions answered. Pt voiced understanding. Reviewed health maintenance. Instructed to continue current medications, diet and exercise. Patient agreed with treatment plan. Follow up as directed. MEDICATIONS:  Orders Placed This Encounter   Medications    methylPREDNISolone (MEDROL, DOMINICK,) 4 MG tablet     Sig: Take by mouth. Dispense:  1 kit     Refill:  0    azithromycin (ZITHROMAX) 250 MG tablet     Sig: Take 2 tabs (500 mg) on Day 1, and take 1 tab (250 mg) on days 2 through 5.      Dispense:  1 packet     Refill:  0    escitalopram (LEXAPRO) 10 MG tablet     Sig: Take 1 tablet by mouth daily     Dispense:  30 tablet     Refill:  3         ORDERS:  Orders Placed This Encounter   Procedures    CBC Auto Differential    Comprehensive Metabolic Panel    Lipid Panel    Hemoglobin A1C    Vitamin D 25 Hydroxy    TSH without Reflex       Follow-up:  Return in about 1 month (around 3/26/2018) for med followup and PAP. PATIENT INSTRUCTIONS:  Patient Instructions   1. Labs today. 2. Begin Lexapro 10 mg daily. 3. Begin zpack and medrol dose pack as directed. 4. Healthy diet and exercise. 5. Follow-up in 1 month with PAP. 6. Begin daily antihistamine such as zyrtec or claritin; take PLAIN Mucinex (no DM) to help thin secretions.       Electronically signed by JOSELINE Ronquillo on 2/28/2018 at 11:18 AM

## 2018-02-27 ASSESSMENT — ENCOUNTER SYMPTOMS
VOICE CHANGE: 0
RHINORRHEA: 0
VOMITING: 0
COUGH: 1
PHOTOPHOBIA: 0
NAUSEA: 0
SHORTNESS OF BREATH: 0
COLOR CHANGE: 0
BACK PAIN: 0

## 2018-02-28 ENCOUNTER — TELEPHONE (OUTPATIENT)
Dept: INTERNAL MEDICINE | Age: 33
End: 2018-02-28

## 2018-02-28 DIAGNOSIS — E78.5 HYPERLIPIDEMIA, UNSPECIFIED HYPERLIPIDEMIA TYPE: Primary | ICD-10-CM

## 2018-02-28 DIAGNOSIS — E55.9 VITAMIN D DEFICIENCY: ICD-10-CM

## 2018-02-28 RX ORDER — ERGOCALCIFEROL (VITAMIN D2) 1250 MCG
50000 CAPSULE ORAL
Qty: 8 CAPSULE | Refills: 3 | Status: SHIPPED | OUTPATIENT
Start: 2018-03-01 | End: 2018-03-26 | Stop reason: SDUPTHER

## 2018-02-28 NOTE — TELEPHONE ENCOUNTER
----- Message from JOSELINE Child sent at 2/28/2018  1:32 PM CST -----  Please inform patient of low vitamin D; begin 71563 units twice weekly and recheck in 3 months. Also, cholesterol is mildly elevated. I would like her to work on low-cholesterol diet and increase exercise; weight loss can significantly lower this number as well. We will recheck lipids in 6 months. Thank you!

## 2018-03-01 ENCOUNTER — TELEPHONE (OUTPATIENT)
Dept: INTERNAL MEDICINE | Age: 33
End: 2018-03-01

## 2018-03-01 RX ORDER — BUSPIRONE HYDROCHLORIDE 10 MG/1
10 TABLET ORAL 3 TIMES DAILY
Qty: 90 TABLET | Refills: 0 | Status: SHIPPED | OUTPATIENT
Start: 2018-03-01 | End: 2018-10-29

## 2018-03-01 NOTE — TELEPHONE ENCOUNTER
Called and spoke to patient, she is not taking the Mucinex or steroid dose pack any longer. She will stop the Lexapro and begin the Buspar and keep her follow up appt. No further questions or concerns at this time.

## 2018-03-11 ENCOUNTER — E-VISIT (OUTPATIENT)
Dept: INTERNAL MEDICINE | Age: 33
End: 2018-03-11
Payer: COMMERCIAL

## 2018-03-11 DIAGNOSIS — J06.9 UPPER RESPIRATORY TRACT INFECTION, UNSPECIFIED TYPE: Primary | ICD-10-CM

## 2018-03-11 PROCEDURE — 99212 OFFICE O/P EST SF 10 MIN: CPT | Performed by: NURSE PRACTITIONER

## 2018-03-11 ASSESSMENT — LIFESTYLE VARIABLES
SMOKING_STATUS: SOMETIMES
HISTORY_OF_SMOKING: I HAVE SMOKED REGULARLY FOR 10-20 YEARS

## 2018-03-12 RX ORDER — AMOXICILLIN AND CLAVULANATE POTASSIUM 875; 125 MG/1; MG/1
1 TABLET, FILM COATED ORAL 2 TIMES DAILY
Qty: 20 TABLET | Refills: 0 | Status: SHIPPED | OUTPATIENT
Start: 2018-03-12 | End: 2018-03-22

## 2018-03-26 ENCOUNTER — OFFICE VISIT (OUTPATIENT)
Dept: INTERNAL MEDICINE | Age: 33
End: 2018-03-26
Payer: COMMERCIAL

## 2018-03-26 ENCOUNTER — HOSPITAL ENCOUNTER (OUTPATIENT)
Age: 33
Setting detail: SPECIMEN
Discharge: HOME | End: 2018-03-26
Payer: COMMERCIAL

## 2018-03-26 VITALS
SYSTOLIC BLOOD PRESSURE: 132 MMHG | HEIGHT: 63 IN | HEART RATE: 97 BPM | TEMPERATURE: 98.3 F | WEIGHT: 229 LBS | OXYGEN SATURATION: 96 % | DIASTOLIC BLOOD PRESSURE: 84 MMHG | BODY MASS INDEX: 40.57 KG/M2

## 2018-03-26 DIAGNOSIS — J40 BRONCHITIS: ICD-10-CM

## 2018-03-26 DIAGNOSIS — Z71.89 ENCOUNTER FOR COUNSELING ABOUT BEHAVIOR DISORDER: ICD-10-CM

## 2018-03-26 DIAGNOSIS — Z01.419 ENCOUNTER FOR CERVICAL PAP SMEAR WITH PELVIC EXAM: Primary | ICD-10-CM

## 2018-03-26 DIAGNOSIS — F41.9 ANXIETY: Chronic | ICD-10-CM

## 2018-03-26 PROCEDURE — 99214 OFFICE O/P EST MOD 30 MIN: CPT | Performed by: NURSE PRACTITIONER

## 2018-03-26 PROCEDURE — 87624 HPV HI-RISK TYP POOLED RSLT: CPT

## 2018-03-26 PROCEDURE — 1036F TOBACCO NON-USER: CPT | Performed by: NURSE PRACTITIONER

## 2018-03-26 PROCEDURE — 88142 CYTOPATH C/V THIN LAYER: CPT

## 2018-03-26 PROCEDURE — 99401 PREV MED CNSL INDIV APPRX 15: CPT | Performed by: NURSE PRACTITIONER

## 2018-03-26 PROCEDURE — G8427 DOCREV CUR MEDS BY ELIG CLIN: HCPCS | Performed by: NURSE PRACTITIONER

## 2018-03-26 PROCEDURE — G8484 FLU IMMUNIZE NO ADMIN: HCPCS | Performed by: NURSE PRACTITIONER

## 2018-03-26 PROCEDURE — G8417 CALC BMI ABV UP PARAM F/U: HCPCS | Performed by: NURSE PRACTITIONER

## 2018-03-26 RX ORDER — BENZONATATE 100 MG/1
100 CAPSULE ORAL 3 TIMES DAILY PRN
COMMUNITY
End: 2018-10-29

## 2018-03-26 RX ORDER — CETIRIZINE HYDROCHLORIDE 10 MG/1
10 TABLET ORAL DAILY
COMMUNITY
End: 2018-10-29

## 2018-03-26 RX ORDER — ERGOCALCIFEROL (VITAMIN D2) 1250 MCG
50000 CAPSULE ORAL
Qty: 8 CAPSULE | Refills: 3 | Status: SHIPPED | OUTPATIENT
Start: 2018-03-26 | End: 2018-10-31 | Stop reason: SDUPTHER

## 2018-03-26 RX ORDER — FLUTICASONE FUROATE AND VILANTEROL 100; 25 UG/1; UG/1
1 POWDER RESPIRATORY (INHALATION) DAILY
Qty: 30 EACH | Refills: 0 | Status: SHIPPED | OUTPATIENT
Start: 2018-03-26 | End: 2018-10-29 | Stop reason: ALTCHOICE

## 2018-03-26 ASSESSMENT — PATIENT HEALTH QUESTIONNAIRE - PHQ9
1. LITTLE INTEREST OR PLEASURE IN DOING THINGS: 0
SUM OF ALL RESPONSES TO PHQ9 QUESTIONS 1 & 2: 0
SUM OF ALL RESPONSES TO PHQ QUESTIONS 1-9: 0
2. FEELING DOWN, DEPRESSED OR HOPELESS: 0

## 2018-03-26 ASSESSMENT — ENCOUNTER SYMPTOMS
COUGH: 0
VOICE CHANGE: 0
NAUSEA: 0
COLOR CHANGE: 0
PHOTOPHOBIA: 0
VOMITING: 0
RHINORRHEA: 0
SHORTNESS OF BREATH: 0
BACK PAIN: 0

## 2018-03-26 NOTE — PROGRESS NOTES
color change and rash. Allergic/Immunologic: Negative for environmental allergies and food allergies. Neurological: Negative for dizziness, speech difficulty and headaches. Hematological: Does not bruise/bleed easily. Psychiatric/Behavioral: Negative for sleep disturbance and suicidal ideas. Objective:     Physical Exam   Constitutional: She is oriented to person, place, and time. She appears well-developed and well-nourished. HENT:   Head: Atraumatic. Right Ear: External ear normal.   Left Ear: External ear normal.   Nose: Nose normal.   Mouth/Throat: Oropharynx is clear and moist.   Eyes: Conjunctivae are normal. Pupils are equal, round, and reactive to light. Neck: Normal range of motion. Neck supple. Cardiovascular: Normal rate, regular rhythm, S1 normal, S2 normal and normal heart sounds. Pulmonary/Chest: Effort normal and breath sounds normal.   Abdominal: Soft. Bowel sounds are normal.   Genitourinary: Rectum normal, vagina normal and uterus normal. No breast swelling or tenderness. There is no tenderness on the right labia. There is no tenderness on the left labia. Cervix exhibits no motion tenderness, no discharge and no friability. Right adnexum displays no mass and no tenderness. Left adnexum displays no mass and no tenderness. No erythema or tenderness in the vagina. No vaginal discharge found. Musculoskeletal: Normal range of motion. Neurological: She is alert and oriented to person, place, and time. Skin: Skin is warm and dry. Psychiatric: She has a normal mood and affect. Her behavior is normal.   Nursing note and vitals reviewed. /84 (Site: Left Arm, Position: Sitting)   Pulse 97   Temp 98.3 °F (36.8 °C)   Ht 5' 3\" (1.6 m)   Wt 229 lb (103.9 kg)   SpO2 96%   BMI 40.57 kg/m²     Assessment:      1. Encounter for cervical Pap smear with pelvic exam  PAP SMEAR   2. Anxiety     3. Encounter for counseling about behavior disorder         Plan:     1.  Pap

## 2018-03-26 NOTE — PATIENT INSTRUCTIONS
1. Pap today. 2. Breo inhaler daily and albuterol as needed for cough/shortness of breath. 3. Continue buspar daily. 4. Follow-up in 6 months for routine visit.

## 2018-04-04 LAB
HPV TYPE 16: NOT DETECTED
HPV TYPE 18: NOT DETECTED
INTERPRETATION: NORMAL
OTHER HIGH RISK HPV: NOT DETECTED
SOURCE: NORMAL

## 2018-10-29 ENCOUNTER — OFFICE VISIT (OUTPATIENT)
Dept: INTERNAL MEDICINE | Age: 33
End: 2018-10-29
Payer: COMMERCIAL

## 2018-10-29 VITALS
OXYGEN SATURATION: 100 % | HEART RATE: 88 BPM | SYSTOLIC BLOOD PRESSURE: 112 MMHG | WEIGHT: 205 LBS | HEIGHT: 63 IN | BODY MASS INDEX: 36.32 KG/M2 | DIASTOLIC BLOOD PRESSURE: 64 MMHG

## 2018-10-29 DIAGNOSIS — N30.01 ACUTE CYSTITIS WITH HEMATURIA: ICD-10-CM

## 2018-10-29 DIAGNOSIS — F41.9 ANXIETY: Primary | ICD-10-CM

## 2018-10-29 DIAGNOSIS — R30.0 DYSURIA: ICD-10-CM

## 2018-10-29 DIAGNOSIS — R10.32 LEFT LOWER QUADRANT PAIN: ICD-10-CM

## 2018-10-29 DIAGNOSIS — Z20.2 POSSIBLE EXPOSURE TO STD: ICD-10-CM

## 2018-10-29 LAB
APPEARANCE FLUID: CLEAR
BILIRUBIN, POC: NORMAL
BLOOD URINE, POC: NORMAL
CLARITY, POC: CLEAR
COLOR, POC: YELLOW
GLUCOSE URINE, POC: NORMAL
KETONES, POC: NORMAL
LEUKOCYTE EST, POC: NORMAL
NITRITE, POC: NORMAL
PH, POC: 5.5
PROTEIN, POC: NORMAL
SPECIFIC GRAVITY, POC: 1.02
UROBILINOGEN, POC: 0.2

## 2018-10-29 PROCEDURE — 81002 URINALYSIS NONAUTO W/O SCOPE: CPT | Performed by: NURSE PRACTITIONER

## 2018-10-29 PROCEDURE — 99395 PREV VISIT EST AGE 18-39: CPT | Performed by: NURSE PRACTITIONER

## 2018-10-29 PROCEDURE — 99213 OFFICE O/P EST LOW 20 MIN: CPT | Performed by: NURSE PRACTITIONER

## 2018-10-29 RX ORDER — PHENAZOPYRIDINE HYDROCHLORIDE 100 MG/1
100 TABLET, FILM COATED ORAL 3 TIMES DAILY PRN
Qty: 9 TABLET | Refills: 0 | Status: SHIPPED | OUTPATIENT
Start: 2018-10-29 | End: 2019-04-29 | Stop reason: ALTCHOICE

## 2018-10-29 RX ORDER — NITROFURANTOIN 25; 75 MG/1; MG/1
100 CAPSULE ORAL 2 TIMES DAILY
Qty: 20 CAPSULE | Refills: 0 | Status: SHIPPED | OUTPATIENT
Start: 2018-10-29 | End: 2018-11-08

## 2018-10-29 ASSESSMENT — ENCOUNTER SYMPTOMS
NAUSEA: 0
RHINORRHEA: 0
VOICE CHANGE: 0
PHOTOPHOBIA: 0
VOMITING: 0
SHORTNESS OF BREATH: 0
BACK PAIN: 0
COUGH: 0
COLOR CHANGE: 0
ABDOMINAL PAIN: 1

## 2018-10-29 NOTE — PATIENT INSTRUCTIONS
1. Macrobid twice daily as directed. 2. Pyridium as needed for pain. 3. Pelvic ultrasound- will call with appt. 4. Make appt with GYN to establish care. 5. Follow-up in 6 months or sooner if needed. Labs fasting prior to visit.

## 2018-10-31 LAB
ORGANISM: ABNORMAL
URINE CULTURE, ROUTINE: ABNORMAL
URINE CULTURE, ROUTINE: ABNORMAL

## 2018-10-31 RX ORDER — ERGOCALCIFEROL (VITAMIN D2) 1250 MCG
50000 CAPSULE ORAL
Qty: 8 CAPSULE | Refills: 5 | Status: SHIPPED | OUTPATIENT
Start: 2018-11-01 | End: 2019-10-28

## 2018-10-31 NOTE — TELEPHONE ENCOUNTER
Pharmacy sent an efax requesting a refill of the below medication which has been pended for you:     Requested Prescriptions     Pending Prescriptions Disp Refills    ergocalciferol (DRISDOL) 97634 units capsule 8 capsule 5     Sig: Take 1 capsule by mouth Twice a Week       Last Appointment Date: 10/29/2018  Next Appointment Date: 4/29/2019    No Known Allergies

## 2018-11-05 ENCOUNTER — HOSPITAL ENCOUNTER (OUTPATIENT)
Dept: ULTRASOUND IMAGING | Age: 33
Discharge: HOME | End: 2018-11-05
Payer: COMMERCIAL

## 2018-11-05 DIAGNOSIS — R10.32 LEFT LOWER QUADRANT PAIN: ICD-10-CM

## 2018-11-05 PROCEDURE — 76856 US EXAM PELVIC COMPLETE: CPT

## 2019-04-29 ENCOUNTER — OFFICE VISIT (OUTPATIENT)
Dept: INTERNAL MEDICINE | Age: 34
End: 2019-04-29
Payer: COMMERCIAL

## 2019-04-29 VITALS
OXYGEN SATURATION: 99 % | BODY MASS INDEX: 36.86 KG/M2 | HEIGHT: 63 IN | HEART RATE: 87 BPM | WEIGHT: 208 LBS | SYSTOLIC BLOOD PRESSURE: 134 MMHG | DIASTOLIC BLOOD PRESSURE: 82 MMHG

## 2019-04-29 DIAGNOSIS — H91.93 DECREASED HEARING OF BOTH EARS: ICD-10-CM

## 2019-04-29 DIAGNOSIS — Z00.00 ENCOUNTER FOR WELLNESS EXAMINATION: Primary | ICD-10-CM

## 2019-04-29 DIAGNOSIS — F41.9 ANXIETY: Chronic | ICD-10-CM

## 2019-04-29 DIAGNOSIS — F32.A DEPRESSION, UNSPECIFIED DEPRESSION TYPE: ICD-10-CM

## 2019-04-29 DIAGNOSIS — Z30.9 ENCOUNTER FOR CONTRACEPTIVE MANAGEMENT, UNSPECIFIED TYPE: ICD-10-CM

## 2019-04-29 DIAGNOSIS — H93.13 TINNITUS OF BOTH EARS: ICD-10-CM

## 2019-04-29 DIAGNOSIS — R10.84 GENERALIZED ABDOMINAL PAIN: ICD-10-CM

## 2019-04-29 PROCEDURE — 99395 PREV VISIT EST AGE 18-39: CPT | Performed by: NURSE PRACTITIONER

## 2019-04-29 PROCEDURE — 99213 OFFICE O/P EST LOW 20 MIN: CPT | Performed by: NURSE PRACTITIONER

## 2019-04-29 ASSESSMENT — ENCOUNTER SYMPTOMS
VOICE CHANGE: 0
COUGH: 0
COLOR CHANGE: 0
NAUSEA: 0
PHOTOPHOBIA: 0
VOMITING: 0
RHINORRHEA: 0
SHORTNESS OF BREATH: 0
BACK PAIN: 0

## 2019-04-29 ASSESSMENT — PATIENT HEALTH QUESTIONNAIRE - PHQ9
1. LITTLE INTEREST OR PLEASURE IN DOING THINGS: 0
SUM OF ALL RESPONSES TO PHQ QUESTIONS 1-9: 0
SUM OF ALL RESPONSES TO PHQ9 QUESTIONS 1 & 2: 0
2. FEELING DOWN, DEPRESSED OR HOPELESS: 0
SUM OF ALL RESPONSES TO PHQ QUESTIONS 1-9: 0

## 2019-04-29 NOTE — PROGRESS NOTES
[facility]  [address]  [address] [place] [address]  Dept: [phone]  Dept Fax: [phone]  Loc: [phone]    Noe Turcios is a 28 y.o. female who presents today for her medical conditions/complaints as noted below. Joanne Turcios is c/o of 6 Month Follow-Up (ears have started ringing, having trouble hearing)        HPI:     HPI   Chief Complaint   Patient presents with    6 Month Follow-Up     ears have started ringing, having trouble hearing     Patient presents today for follow-up. She did not have labs done today. Patient has been on buspar in the past but stopped due to not really being able to take anything consistently. She tells me today that she is getting . She is struggling. She is working more hours at [employer]; she is stressed with this. She is seeing a therapist and a marriage counselor. She complains of stomach discomfort, gas, and diarrhea. She was on the keto diet for several months and she states that since coming off the diet she is now having stomach issues. She has mostly gas pains. She notices symptoms may be worse with breads/starches. Patient requests to start birth control; she is interested in [place]. We will refer to OBGYN for management of this.      Past Medical History:   Diagnosis Date    Anxiety     Chronic back pain     OCD (obsessive compulsive disorder)       Past Surgical History:   Procedure Laterality Date     SECTION      LUMBAR DISCECTOMY      LUMBAR LAMINECTOMY         Vitals 2019 10/29/2018 3/26/2018 2018 2018 [id number]   SYSTOLIC 800 [phone] 954 -   DIASTOLIC 82 64 84 70 62 -   Site Left Upper Arm Left Upper Arm Left Arm Left Arm - -   Position - Sitting Sitting Sitting - -   Pulse 87 88 97 89 94 -   Temp - - 98.3 98.5 97.5 -   Resp - - - - 22 -   Weight 208 lb 205 lb 229 lb 232 lb 227 lb -   Height 5' 3\" 5' 3\" 5' 3\" 5' 3\" - -   BMI (wt*703/ht~2) 36.84 kg/m2 36.31 kg/m2 40.56 kg/m2 41.09 kg/m2 - -   Pain Level - - - - - 7       No family history on file. Social History     Tobacco Use    Smoking status: Former Smoker     Packs/day: 0.00     Types: Cigarettes    Smokeless tobacco: Current User   Substance Use Topics    Alcohol use: Yes     Alcohol/week: 4.8 oz     Types: 7 Cans of beer, 1 Shots of liquor per week      Current Outpatient Medications   Medication Sig Dispense Refill    albuterol sulfate (PROAIR RESPICLICK) 610 (90 Base) MCG/ACT aerosol powder inhalation Inhale 1 puff into the lungs every 6 hours as needed for Wheezing or Shortness of Breath (as needed) 1 Inhaler 3    ergocalciferol (DRISDOL) 59745 units capsule Take 1 capsule by mouth Twice a Week 8 capsule 5     No current facility-administered medications for this visit. No Known Allergies    Health Maintenance   Topic Date Due    HIV screen  06/01/1999    A1C test (Diabetic or Prediabetic)  02/26/2019    Flu vaccine (Season Ended) 10/29/2019 (Originally 9/1/2019)    DTaP/Tdap/Td vaccine (2 - Td) 04/02/2022    Cervical cancer screen  03/26/2023    Pneumococcal 0-64 years Vaccine  Aged Out    Varicella Vaccine  Discontinued       Subjective:      Review of Systems   Constitutional: Negative for chills and fever. HENT: Positive for tinnitus. Negative for ear pain, hearing loss, rhinorrhea and voice change. Eyes: Negative for photophobia and visual disturbance. Respiratory: Negative for cough and shortness of breath. Cardiovascular: Negative for chest pain and palpitations. Gastrointestinal: Negative for nausea and vomiting. Endocrine: Negative. Negative for cold intolerance and heat intolerance. Genitourinary: Negative for difficulty urinating and flank pain. Musculoskeletal: Negative for back pain and neck pain. Skin: Negative for color change and rash. Allergic/Immunologic: Negative for environmental allergies and food allergies. Neurological: Negative for dizziness, speech difficulty and headaches. Hematological: Does not bruise/bleed easily. Psychiatric/Behavioral: Negative for sleep disturbance and suicidal ideas. Objective:     Physical Exam   Constitutional: She is oriented to person, place, and time. She appears well-developed and well-nourished. HENT:   Head: Atraumatic. Right Ear: External ear normal.   Left Ear: External ear normal.   Nose: Nose normal.   Mouth/Throat: Oropharynx is clear and moist.   Eyes: Pupils are equal, round, and reactive to light. Conjunctivae are normal.   Neck: Normal range of motion. Neck supple. Cardiovascular: Normal rate, regular rhythm, S1 normal, S2 normal and normal heart sounds. Pulmonary/Chest: Effort normal and breath sounds normal.   Abdominal: Soft. Bowel sounds are normal.   Musculoskeletal: Normal range of motion. Neurological: She is alert and oriented to person, place, and time. Skin: Skin is warm and dry. Psychiatric: She has a normal mood and affect. Her behavior is normal.   Nursing note and vitals reviewed. /82 (Site: Left Upper Arm)   Pulse 87   Ht 5' 3\" (1.6 m)   Wt 208 lb (94.3 kg)   SpO2 99%   BMI 36.85 kg/m²     Assessment:       Diagnosis Orders   1. Anxiety     2. Tinnitus of both Floyce Mervat, Audiology, [facility]   3. Decreased hearing of both ears     4. Depression, unspecified depression type     5. Generalized abdominal pain  Lipase    Amylase   6. Encounter for contraceptive management, unspecified type  Hollie Arreaga NP, OB/GYN, [place]       Plan:     1. Refer to GYN for birth control management. 2. Refer to audiology for hearing test.  3. Eliminate aggravating foods and slowly re-introduce. 4. Follow-up for worsened abdominal pain  5. Get labs drawn (fasting) this week. 6. Do not hesitate to return for worsening symptoms, anxiety/depression.   7. Follow-up in 6 months with labs prior to next appointment. Patient given educational materials -see patient instructions. Discussed use, benefit, and side effects of prescribed medications. All patient questions answered. Pt voiced understanding. Reviewed health maintenance. Instructed to continue currentmedications, diet and exercise. Patient agreed with treatment plan. Follow up as directed. MEDICATIONS:  Orders Placed This Encounter   Medications    albuterol sulfate (PROAIR RESPICLICK) 419 (90 Base) MCG/ACT aerosol powder inhalation     Sig: Inhale 1 puff into the lungs every 6 hours as needed for Wheezing or Shortness of Breath (as needed)     Dispense:  1 Inhaler     Refill:  3         ORDERS:  Orders Placed This Encounter   Procedures    Orly Pena NP, OB/GYN, [address] [place] [address], Audiology, [facility]       Follow-up:  No follow-ups on file. PATIENT INSTRUCTIONS:  There are no Patient Instructions on file for this visit. Electronically signed by JOSELINE Olivares on 4/29/2019 at 12:49 PM    EMR Dragon/transcription disclaimer:  Much of thisencounter note is electronic transcription/translation of spoken language to printed texts. The electronic translation of spoken language may be erroneous, or at times, nonsensical words or phrases may be inadvertentlytranscribed.   Although I have reviewed the note for such errors, some may still exist.

## 2019-05-02 DIAGNOSIS — F41.9 ANXIETY: ICD-10-CM

## 2019-05-02 DIAGNOSIS — R10.84 GENERALIZED ABDOMINAL PAIN: ICD-10-CM

## 2019-05-02 DIAGNOSIS — Z20.2 POSSIBLE EXPOSURE TO STD: ICD-10-CM

## 2019-05-02 DIAGNOSIS — E55.9 VITAMIN D DEFICIENCY: ICD-10-CM

## 2019-05-02 DIAGNOSIS — R30.0 DYSURIA: ICD-10-CM

## 2019-05-02 LAB
ALBUMIN SERPL-MCNC: 4.2 G/DL (ref 3.5–5.2)
ALP BLD-CCNC: 51 U/L (ref 35–104)
ALT SERPL-CCNC: 13 U/L (ref 5–33)
AMYLASE: 31 U/L (ref 28–100)
ANION GAP SERPL CALCULATED.3IONS-SCNC: 13 MMOL/L (ref 7–19)
AST SERPL-CCNC: 10 U/L (ref 5–32)
BASOPHILS ABSOLUTE: 0.1 K/UL (ref 0–0.2)
BASOPHILS RELATIVE PERCENT: 0.7 % (ref 0–1)
BILIRUB SERPL-MCNC: 0.3 MG/DL (ref 0.2–1.2)
BUN BLDV-MCNC: 12 MG/DL (ref 6–20)
CALCIUM SERPL-MCNC: 9.4 MG/DL (ref 8.6–10)
CHLORIDE BLD-SCNC: 106 MMOL/L (ref 98–111)
CHOLESTEROL, TOTAL: 232 MG/DL (ref 160–199)
CO2: 24 MMOL/L (ref 22–29)
CREAT SERPL-MCNC: 0.5 MG/DL (ref 0.5–0.9)
EOSINOPHILS ABSOLUTE: 0.2 K/UL (ref 0–0.6)
EOSINOPHILS RELATIVE PERCENT: 2 % (ref 0–5)
GFR NON-AFRICAN AMERICAN: >60
GLUCOSE BLD-MCNC: 93 MG/DL (ref 74–109)
HBA1C MFR BLD: 5.5 % (ref 4–6)
HCT VFR BLD CALC: 39.1 % (ref 37–47)
HDLC SERPL-MCNC: 63 MG/DL (ref 65–121)
HEMOGLOBIN: 12.6 G/DL (ref 12–16)
LDL CHOLESTEROL CALCULATED: 152 MG/DL
LIPASE: 26 U/L (ref 13–60)
LYMPHOCYTES ABSOLUTE: 2.9 K/UL (ref 1.1–4.5)
LYMPHOCYTES RELATIVE PERCENT: 36 % (ref 20–40)
MCH RBC QN AUTO: 29.3 PG (ref 27–31)
MCHC RBC AUTO-ENTMCNC: 32.2 G/DL (ref 33–37)
MCV RBC AUTO: 90.9 FL (ref 81–99)
MONOCYTES ABSOLUTE: 0.7 K/UL (ref 0–0.9)
MONOCYTES RELATIVE PERCENT: 8.6 % (ref 0–10)
NEUTROPHILS ABSOLUTE: 4.3 K/UL (ref 1.5–7.5)
NEUTROPHILS RELATIVE PERCENT: 52.3 % (ref 50–65)
PDW BLD-RTO: 14.1 % (ref 11.5–14.5)
PLATELET # BLD: 311 K/UL (ref 130–400)
PMV BLD AUTO: 9.7 FL (ref 9.4–12.3)
POTASSIUM SERPL-SCNC: 4 MMOL/L (ref 3.5–5)
RBC # BLD: 4.3 M/UL (ref 4.2–5.4)
SODIUM BLD-SCNC: 143 MMOL/L (ref 136–145)
TOTAL PROTEIN: 7.3 G/DL (ref 6.6–8.7)
TRIGL SERPL-MCNC: 84 MG/DL (ref 0–149)
TSH SERPL DL<=0.05 MIU/L-ACNC: 2.74 UIU/ML (ref 0.27–4.2)
WBC # BLD: 8.1 K/UL (ref 4.8–10.8)

## 2019-05-04 LAB — VITAMIN D 1,25-DIHYDROXY: 51.2 PG/ML (ref 19.9–79.3)

## 2019-05-05 LAB
APTIMA MEDIA TYPE: NORMAL
CHLAMYDIA TRACHOMATIS AMPLIFIED DET: NEGATIVE
N GONORRHOEAE AMPLIFIED DET: NEGATIVE
SPECIMEN SOURCE: NORMAL

## 2019-05-06 ENCOUNTER — OFFICE VISIT (OUTPATIENT)
Dept: OTOLARYNGOLOGY | Age: 34
End: 2019-05-06
Payer: COMMERCIAL

## 2019-05-06 ENCOUNTER — PROCEDURE VISIT (OUTPATIENT)
Dept: OTOLARYNGOLOGY | Age: 34
End: 2019-05-06
Payer: COMMERCIAL

## 2019-05-06 VITALS
DIASTOLIC BLOOD PRESSURE: 80 MMHG | WEIGHT: 208 LBS | TEMPERATURE: 97.4 F | HEIGHT: 63 IN | RESPIRATION RATE: 18 BRPM | OXYGEN SATURATION: 100 % | HEART RATE: 75 BPM | BODY MASS INDEX: 36.86 KG/M2 | SYSTOLIC BLOOD PRESSURE: 124 MMHG

## 2019-05-06 DIAGNOSIS — H93.13 TINNITUS OF BOTH EARS: Primary | ICD-10-CM

## 2019-05-06 DIAGNOSIS — H93.11 TINNITUS OF RIGHT EAR: Primary | ICD-10-CM

## 2019-05-06 DIAGNOSIS — H93.A9 PULSATILE TINNITUS: ICD-10-CM

## 2019-05-06 PROCEDURE — 92550 TYMPANOMETRY & REFLEX THRESH: CPT | Performed by: AUDIOLOGIST

## 2019-05-06 PROCEDURE — 99202 OFFICE O/P NEW SF 15 MIN: CPT | Performed by: OTOLARYNGOLOGY

## 2019-05-06 PROCEDURE — 92504 EAR MICROSCOPY EXAMINATION: CPT | Performed by: OTOLARYNGOLOGY

## 2019-05-06 PROCEDURE — 92557 COMPREHENSIVE HEARING TEST: CPT | Performed by: AUDIOLOGIST

## 2019-05-06 NOTE — PROGRESS NOTES
History   Dar Turciso is a 28 y.o. female who presented to the clinic this date with complaints of pulsatile tinnitus in her right ear. She reported onset 3-4 years ago. She noted the sound changes when she turns her head and gets louder and faster when she works out. She denied dizziness but reported she will pass out if she tilts her head to hold her phone on her shoulder. She further reported a decrease in hearing bilaterally and noted increased difficulty following conversations in background noise over the last 10-11 months. Summary   Tympanometry and acoustic reflexes consistent with normal middle ear function bilaterally. Pure tone testing indicates hearing WNL bilaterally. Word recognition was excellent in both ears. Speech in noise testing with the QSin was WNL binaurally. Results   Otoscopy:    Right: Clear EAC/Normal TM   Left: Clear EAC/Normal TM    Audiometry:    Right: Hearing WNL   Left: Hearing WNL         Tympanometry:     Right: Type A   Left: Type A    Acoustic Reflex Thresholds:     Right Left   Ipsilateral Present Present   Contralateral Present Present       Plan   Results of today's testing were discussed with Ms. Turcios and the following recommendations were made:    1. Follow up with ENT as scheduled. 2. Monitor hearing yearly, sooner with changes. 3. Hearing protection in loud environments.         Audiogram and Acoustic Immittance

## 2019-05-06 NOTE — PROGRESS NOTES
Tinnitus-pulsatile along with vaso-vagal history of fainting and also typical fluctuating high pitch tinnitus both ears. SUBJECTIVE:    4-5 years history hearing heartbeat right ear  No known vascular abnormalities or congenital vascular markings. Under great deal of personal stress. No true vertigo. No other neuro complaints. Mentation good. Difficulty with discrimination in noise competitive environment. A 12 point review of systems was completed, reviewed, and scanned to chart per staff. Patient medical history reviewed. meds reviewed. OBJECTIVE:    Physical Exam:     General appearance: Normally developed structures of the head and neck with no deformities. Ability to communicate: Normal voice with ability to convey appropriately the nature of their complaints. Head and Face: Normal appearance with no visible lesions of the skin. Sinus tenderness: None appreciated on exam. No areas of facial swelling. Facial strength: No weakness of the facial muscles appreciated. Microscope: Normal external ear canals and tympanic membranes. Due to nature of patient complaint a microscopic exam of the ear was performed. Normal with no MES findings. No pulsations noted of drum. Hearing assessment:Audio today normal hearing. External ears and nose: normal.   Nasal exam: Anterior speculum exam normal with no polyps purulence or lesions. Oral:  Mucosa of buccal, floor of mouth, and palatal areas appear normal and free of any lesions. Lips, teeth, gingiva: Normal with no lesions. Piercing tongue  Oropharynx: Tongue reveals normal appearance and mobility. Oral mucosa of buccal, floor of mouth, and palatal areas appear normal and free of any lesions or ulcerations. Salivary:  Examination of the parotid and submandibular glands was normal with no palpable masses, nodules or irregularities. Neck: No gross swellings or deformities. No palpable lymphadenopathy.  Thyroid normal without palpable masses. Respiratory:  Effort appears normal with no notable distress, stridor,accessory muscle usage or tachypnea   Neurological:  WNL with pulsatile sounds right ear. Cranial nerves 2-12 intact. EOMI. Motor strength otherwise good and no sensory deficits identified. Romberg negative. No gait disturbance with seemingly good balance. Psychiatric:  Oriented x 3 with wnl mood and affect. Speech appropriate, behavior wnl and seemingly normal thought content. ASSESSMENT:    Pulsatile tinnitus /Tinnitus    PLAN:  Informed the following test warranted to evaluate this problem. CT angiogram / Magnetic resonance angiography declined and patient also informed she may wish to seek neuro otologic opinion for these complaints. Recommended by me and declined at present      Vascular lesions rare but given option to scan and pt declined but if any progression or persistence to symptoms then CTA head or MRA head can be ordered. MS, SSCC dehiscence, vascular metabolic all explained. RTO if worse. Declined further work up at this time. Electronically signed by Yogesh Baker on 5/6/19 at 3:21 PM Yakov Salazar MD,  I personally performed the services described in this documentation as scribed by Yogesh Baker in my presence and it appears accurate and complete.

## 2019-05-07 ENCOUNTER — TELEPHONE (OUTPATIENT)
Dept: OTOLARYNGOLOGY | Age: 34
End: 2019-05-07

## 2019-05-07 NOTE — TELEPHONE ENCOUNTER
Patient called stating she  Remembered a weird symptom she has. \"Chokes on air then sneezes 20 plus times. \"    Dr. Hui Hardwick consulted:  Not alarming. Goes back to need for diagnostic testing. Patient voiced understanding.

## 2019-06-03 ENCOUNTER — OFFICE VISIT (OUTPATIENT)
Dept: OBGYN | Age: 34
End: 2019-06-03
Payer: COMMERCIAL

## 2019-06-03 VITALS
BODY MASS INDEX: 37.03 KG/M2 | HEART RATE: 92 BPM | SYSTOLIC BLOOD PRESSURE: 135 MMHG | DIASTOLIC BLOOD PRESSURE: 90 MMHG | WEIGHT: 209 LBS | HEIGHT: 63 IN

## 2019-06-03 DIAGNOSIS — N92.0 MENORRHAGIA WITH REGULAR CYCLE: ICD-10-CM

## 2019-06-03 DIAGNOSIS — Z76.89 ENCOUNTER TO ESTABLISH CARE WITH NEW DOCTOR: Primary | ICD-10-CM

## 2019-06-03 PROCEDURE — 99203 OFFICE O/P NEW LOW 30 MIN: CPT | Performed by: ADVANCED PRACTICE MIDWIFE

## 2019-06-03 ASSESSMENT — ENCOUNTER SYMPTOMS
EYES NEGATIVE: 1
ALLERGIC/IMMUNOLOGIC NEGATIVE: 1
GASTROINTESTINAL NEGATIVE: 1
RESPIRATORY NEGATIVE: 1

## 2019-06-03 NOTE — PATIENT INSTRUCTIONS
Patient Education        Learning About Birth Control  What is birth control? Birth control is any method used to prevent pregnancy. Another word for birth control is contraception. If you have sex without birth control, there is a chance that you could get pregnant. This is true even if you have not started having periods yet or you are getting close to menopause. The only sure way to prevent pregnancy is to not have sex. But finding a good method of birth control that you are comfortable with can help you avoid an unplanned pregnancy. Be sure to tell your doctor about any health problems you have or medicines you take. He or she can help you choose the birth control method that is right for you. What are the types of birth control? There are many different kinds of birth control. Each has pros and cons. Learning about all the methods will help you find one that is right for you. · Long-acting reversible contraception (LARC) is the most effective reversible method you can use to prevent pregnancy. If you decide you want to get pregnant, you can have them removed. LARCs are implants and intrauterine devices (IUDs). While they are being used, they usually prevent pregnancy for years. ? Implants are placed under the skin of the arm. They release the hormone progestin and prevent pregnancy for about 3 years. ? IUDs are placed in the uterus by a doctor. There are two main types of IUDs--the copper IUD and the hormonal IUD. The hormonal IUD releases progestin. IUDs prevent pregnancy for 3 to 10 years, depending on the type. · Hormonal methods are very good at preventing pregnancy. Combination birth control pills (\"the pill\"), skin patches, and vaginal rings release the hormones estrogen and progestin. Shots, mini-pills, hormonal IUDs, and implants release progestin only. · Barrier methods generally do not prevent pregnancy as well as IUDs or hormonal methods do.  Barrier methods include condoms, diaphragms, condoms are cheap or free in some clinics. Some insurance companies cover the cost of prescription birth control. But cost can sometimes be misleading. An IUD costs a lot up front. But it works for years, making it low-cost over time. · Whether it protects you from infection. Latex condoms can help protect you from sexually transmitted infections (STIs), such as herpes or HIV/AIDS. But they are not the best way to prevent pregnancy. To avoid both STIs and pregnancy, use condoms along with another type of birth control. · Whether you've had a problem with one kind of birth control. Finding the best method of birth control may involve trying something different. Also, you may need to change a method that once worked well for you. · Whether you want children. If you are positive you don't want children, a lasting method of birth control might be best.  · Your health issues. Some birth control methods may not be safe for you, depending on your health issues. For example, women who smoke, are breastfeeding, or have had breast cancer may not be able to use certain methods. How can you get birth control? · You can buy:  ? Condoms, sponges, and spermicides without a prescription in drugstores, online, and in many grocery stores. ? Some forms of emergency contraception without a prescription at most drugstores. · You need to see a doctor to:  ? Get a prescription for birth control pills and other methods that use hormones. ? Have an implant or IUD inserted, including the type of IUD used for emergency contraception. ? Get a hormone shot. ? Get a prescription for a diaphragm or cervical cap. ? Get a prescription for certain kinds of emergency contraception. Where can you learn more? Go to https://aron.healthBeezag. org and sign in to your [employer] account. Enter M555 in the [facility] box to learn more about \"Learning About Birth Control. \"     If you do not have an account, please click on the \"Sign Up Now\" link. Current as of: September 5, 2018  Content Version: 12.0  © 0109-0834 Healthwise, Incorporated. Care instructions adapted under license by [facility] ([facility]). If you have questions about a medical condition or this instruction, always ask your healthcare professional. [place] [address] any warranty or liability for your use of this information.

## 2019-07-09 ENCOUNTER — OFFICE VISIT (OUTPATIENT)
Dept: OBGYN | Age: 34
End: 2019-07-09
Payer: COMMERCIAL

## 2019-07-09 VITALS
DIASTOLIC BLOOD PRESSURE: 88 MMHG | WEIGHT: 215 LBS | BODY MASS INDEX: 36.7 KG/M2 | SYSTOLIC BLOOD PRESSURE: 120 MMHG | HEIGHT: 64 IN

## 2019-07-09 DIAGNOSIS — N92.0 MENORRHAGIA WITH REGULAR CYCLE: Primary | ICD-10-CM

## 2019-07-09 PROCEDURE — 99213 OFFICE O/P EST LOW 20 MIN: CPT | Performed by: ADVANCED PRACTICE MIDWIFE

## 2019-07-09 ASSESSMENT — ENCOUNTER SYMPTOMS
GASTROINTESTINAL NEGATIVE: 1
ALLERGIC/IMMUNOLOGIC NEGATIVE: 1
RESPIRATORY NEGATIVE: 1
EYES NEGATIVE: 1

## 2019-07-09 NOTE — PROGRESS NOTES
Pt got denied the Mirena and would like to discuss other options as far as [facility] goes. She has horrible periods, heavy bleeding and blood clots during per cycle.

## 2019-10-28 ENCOUNTER — OFFICE VISIT (OUTPATIENT)
Dept: PRIMARY CARE CLINIC | Age: 34
End: 2019-10-28
Payer: COMMERCIAL

## 2019-10-28 VITALS
HEIGHT: 63 IN | BODY MASS INDEX: 38.98 KG/M2 | DIASTOLIC BLOOD PRESSURE: 62 MMHG | SYSTOLIC BLOOD PRESSURE: 114 MMHG | OXYGEN SATURATION: 97 % | WEIGHT: 220 LBS | HEART RATE: 85 BPM

## 2019-10-28 DIAGNOSIS — F41.9 ANXIETY: Chronic | ICD-10-CM

## 2019-10-28 DIAGNOSIS — M72.2 PLANTAR FASCIITIS: Primary | ICD-10-CM

## 2019-10-28 PROCEDURE — 99212 OFFICE O/P EST SF 10 MIN: CPT | Performed by: NURSE PRACTITIONER

## 2019-10-28 RX ORDER — ERGOCALCIFEROL (VITAMIN D2) 1250 MCG
50000 CAPSULE ORAL
Qty: 8 CAPSULE | Refills: 5 | Status: CANCELLED | OUTPATIENT
Start: 2019-10-28

## 2019-10-28 RX ORDER — METHYLPREDNISOLONE 4 MG/1
TABLET ORAL
Qty: 1 KIT | Refills: 0 | Status: SHIPPED | OUTPATIENT
Start: 2019-10-28 | End: 2020-02-01

## 2019-10-28 RX ORDER — NAPROXEN 500 MG/1
500 TABLET ORAL 2 TIMES DAILY PRN
Qty: 60 TABLET | Refills: 0 | Status: SHIPPED | OUTPATIENT
Start: 2019-10-28 | End: 2021-11-24

## 2019-10-28 RX ORDER — ERGOCALCIFEROL (VITAMIN D2) 1250 MCG
50000 CAPSULE ORAL
COMMUNITY
End: 2019-12-18

## 2019-10-28 ASSESSMENT — ENCOUNTER SYMPTOMS
VOMITING: 0
SHORTNESS OF BREATH: 0
NAUSEA: 0
VOICE CHANGE: 0
BACK PAIN: 0
PHOTOPHOBIA: 0
COLOR CHANGE: 0
RHINORRHEA: 0
COUGH: 0

## 2019-12-18 RX ORDER — ERGOCALCIFEROL 1.25 MG/1
CAPSULE ORAL
Qty: 8 CAPSULE | Refills: 3 | Status: SHIPPED | OUTPATIENT
Start: 2019-12-18 | End: 2020-06-01

## 2020-02-01 ENCOUNTER — HOSPITAL ENCOUNTER (EMERGENCY)
Age: 35
Discharge: HOME | End: 2020-02-01
Payer: COMMERCIAL

## 2020-02-01 ENCOUNTER — APPOINTMENT (OUTPATIENT)
Dept: CT IMAGING | Age: 35
End: 2020-02-01
Payer: COMMERCIAL

## 2020-02-01 VITALS
DIASTOLIC BLOOD PRESSURE: 93 MMHG | HEIGHT: 64 IN | RESPIRATION RATE: 18 BRPM | SYSTOLIC BLOOD PRESSURE: 133 MMHG | BODY MASS INDEX: 37.56 KG/M2 | OXYGEN SATURATION: 95 % | HEART RATE: 93 BPM | WEIGHT: 220 LBS

## 2020-02-01 LAB
BACTERIA: ABNORMAL /HPF
BILIRUBIN URINE: NEGATIVE
BLOOD, URINE: ABNORMAL
CLARITY: ABNORMAL
COLOR: YELLOW
EPITHELIAL CELLS, UA: ABNORMAL /HPF
GLUCOSE URINE: NEGATIVE MG/DL
HCG(URINE) PREGNANCY TEST: NEGATIVE
KETONES, URINE: NEGATIVE MG/DL
LEUKOCYTE ESTERASE, URINE: ABNORMAL
MUCUS: ABNORMAL /LPF
NITRITE, URINE: NEGATIVE
PH UA: 6.5 (ref 5–8)
PROTEIN UA: NEGATIVE MG/DL
RBC UA: ABNORMAL /HPF (ref 0–2)
SPECIFIC GRAVITY UA: 1.03 (ref 1–1.03)
URINE REFLEX TO CULTURE: YES
UROBILINOGEN, URINE: 0.2 E.U./DL
WBC UA: ABNORMAL /HPF (ref 0–5)

## 2020-02-01 PROCEDURE — 87086 URINE CULTURE/COLONY COUNT: CPT

## 2020-02-01 PROCEDURE — 96372 THER/PROPH/DIAG INJ SC/IM: CPT

## 2020-02-01 PROCEDURE — 81001 URINALYSIS AUTO W/SCOPE: CPT

## 2020-02-01 PROCEDURE — 72131 CT LUMBAR SPINE W/O DYE: CPT

## 2020-02-01 PROCEDURE — 87186 SC STD MICRODIL/AGAR DIL: CPT

## 2020-02-01 PROCEDURE — 6360000002 HC RX W HCPCS: Performed by: PHYSICIAN ASSISTANT

## 2020-02-01 PROCEDURE — 84703 CHORIONIC GONADOTROPIN ASSAY: CPT

## 2020-02-01 PROCEDURE — 99284 EMERGENCY DEPT VISIT MOD MDM: CPT

## 2020-02-01 RX ORDER — MELOXICAM 15 MG/1
15 TABLET ORAL DAILY
Qty: 30 TABLET | Refills: 0 | Status: SHIPPED | OUTPATIENT
Start: 2020-02-01 | End: 2021-10-14

## 2020-02-01 RX ORDER — KETOROLAC TROMETHAMINE 30 MG/ML
15 INJECTION, SOLUTION INTRAMUSCULAR; INTRAVENOUS ONCE
Status: COMPLETED | OUTPATIENT
Start: 2020-02-01 | End: 2020-02-01

## 2020-02-01 RX ORDER — ORPHENADRINE CITRATE 30 MG/ML
60 INJECTION INTRAMUSCULAR; INTRAVENOUS ONCE
Status: COMPLETED | OUTPATIENT
Start: 2020-02-01 | End: 2020-02-01

## 2020-02-01 RX ORDER — PREDNISONE 10 MG/1
10 TABLET ORAL DAILY
Qty: 10 TABLET | Refills: 0 | Status: SHIPPED | OUTPATIENT
Start: 2020-02-01 | End: 2020-02-11

## 2020-02-01 RX ADMIN — ORPHENADRINE CITRATE 60 MG: 30 INJECTION INTRAMUSCULAR; INTRAVENOUS at 16:33

## 2020-02-01 RX ADMIN — KETOROLAC TROMETHAMINE 15 MG: 30 INJECTION, SOLUTION INTRAMUSCULAR at 16:33

## 2020-02-01 ASSESSMENT — PAIN DESCRIPTION - LOCATION: LOCATION: BACK

## 2020-02-01 ASSESSMENT — PAIN DESCRIPTION - ORIENTATION: ORIENTATION: LOWER

## 2020-02-01 ASSESSMENT — PAIN SCALES - GENERAL
PAINLEVEL_OUTOF10: 4
PAINLEVEL_OUTOF10: 8

## 2020-02-01 ASSESSMENT — PAIN DESCRIPTION - PAIN TYPE: TYPE: ACUTE PAIN

## 2020-02-01 NOTE — ED PROVIDER NOTES
[facility] EMERGENCY DEPT  eMERGENCYdEPARTMENT eNCOUnter      Pt Name: Rob Turcios  MRN: [id number]  [place] 1985  Date of evaluation: 2/1/2020  FEDERICO Arciniega    CHIEF COMPLAINT       Chief Complaint   Patient presents with    Back Pain     c/o lower lumbar spine, states ruptured disc hx, radiating into right leg          HISTORY OF PRESENT ILLNESS  (Location/Symptom, Timing/Onset, Context/Setting, Quality, Duration, Modifying Factors, Severity.)   Rob Turcios is a 27 y.o. female who presents to the emergency department with complaints of right buttock pain radiating into right thigh. Patient Has history of ruptured disc has had disc replacement at L5-S1 by Dr Yadira Tomlinson with ortho spine 2015. Has laminectomy history at L4-5 same year. Patient admits to getting up out of her bed this morning and twisting 0800 stating pain was sudden rated at 10/10 she admits to taking gabapentin prednisone and 1 percocet 7.5 pain has been relieved to 8/10 ambulatory. She has already urinated here in ED for urine sample at will denies urinary bowel incontinence. Denies fall. Denies pain past knee. No saddle anesthesia. Denies any  symptoms. Chad Lopez #[id number]    HPI    Nursing Notes were reviewed and I agree. REVIEW OF SYSTEMS    (2-9 systems for level 4, 10 or more for level 5)     Review of Systems   Constitutional: Negative for activity change, appetite change, chills and fever. HENT: Negative for congestion, postnasal drip, rhinorrhea and sore throat. Eyes: Negative for photophobia, pain, discharge and visual disturbance. Respiratory: Negative for apnea, cough and shortness of breath. Cardiovascular: Negative for chest pain and leg swelling. Gastrointestinal: Negative for abdominal distention, abdominal pain and nausea. Musculoskeletal: Positive for back pain, gait problem and myalgias. Negative for arthralgias, joint swelling, neck pain and neck stiffness.    Skin: Negative for color change and rash.   Neurological: Negative for dizziness, syncope, facial asymmetry and headaches. Hematological: Negative for adenopathy. Does not bruise/bleed easily. Psychiatric/Behavioral: Negative for agitation, behavioral problems and confusion. Except as noted above the remainder of the review of systems was reviewed and negative. PAST MEDICAL HISTORY     Past Medical History:   Diagnosis Date    Anxiety     Chronic back pain     OCD (obsessive compulsive disorder)          SURGICAL HISTORY       Past Surgical History:   Procedure Laterality Date     SECTION  2003    LUMBAR DISCECTOMY      LUMBAR LAMINECTOMY           CURRENT MEDICATIONS       Discharge Medication List as of 2020  4:53 PM      CONTINUE these medications which have NOT CHANGED    Details   naproxen (NAPROSYN) 500 MG tablet Take 1 tablet by mouth 2 times daily as needed for Pain, Disp-60 tablet, R-0Normal      albuterol sulfate (PROAIR RESPICLICK) 662 (90 Base) MCG/ACT aerosol powder inhalation Inhale 1 puff into the lungs every 6 hours as needed for Wheezing or Shortness of Breath (as needed), Disp-1 Inhaler, R-3Normal      vitamin D (ERGOCALCIFEROL) 1.25 MG (01270 UT) CAPS capsule TAKE 1 CAPSULE BY MOUTH 2 TIMES A WEEK, Disp-8 capsule, R-3Normal             ALLERGIES     Patient has no known allergies. FAMILY HISTORY     History reviewed. No pertinent family history.        SOCIAL HISTORY       Social History     Socioeconomic History    Marital status:      Spouse name: None    Number of children: None    Years of education: None    Highest education level: None   Occupational History    None   Social Needs    Financial resource strain: None    Food insecurity:     Worry: None     Inability: None    Transportation needs:     Medical: None     Non-medical: None   Tobacco Use    Smoking status: Current Every Day Smoker     Packs/day: 0.00     Types: Cigarettes    Smokeless tobacco: Current User other components within normal limits   MICROSCOPIC URINALYSIS - Abnormal; Notable for the following components:    Mucus, UA 1+ (*)     WBC, UA 6-10 (*)     RBC, UA 6-10 (*)     All other components within normal limits   URINE CULTURE   PREGNANCY, URINE       All other labs were within normal range or notreturned as of this dictation. RE-ASSESSMENT        EMERGENCY DEPARTMENT COURSE and DIFFERENTIAL DIAGNOSIS/MDM:   Vitals:    Vitals:    02/01/20 1522 02/01/20 1524   BP:  (!) 133/93   Pulse:  93   Resp:  18   SpO2:  95%   Weight: 220 lb (99.8 kg)    Height: 5' 4\" (1.626 m)          MDM  Patient CT today is overall unremarkable. All findings are discussed with the patient. I also discussed all findings with Magi arias the PA for Dr. Garry Peralta. This is the spine physician that has been following this patient in the past.  He is agreeable to see this patient on Monday if symptoms persist.  He indicates he would like her to start on anti-inflammatories and steroids for short-term. Without any bowel bladder incontinence saddle anesthesia I did not feel MRI emergently warranted today. She is ambulatory here in the ER and provides us with urine sample willingly. Plan for discharge and follow with spine if issues persist.    2/3/2020  Spoke with patient on the phone in regards to urine sample. She remains asymptomic in regards to  complaints. Her back pain is improving with usage of steroids and anti-inflammatories I prescribed per ortho spine instruction. I was going to hold off on abx given the sample has more than 5 epi cells with patient improvement in outpatient. She has appt as needed with ortho spine already made. 2/6/2020  Spoke with patient again today. She states her back pain is almost resolved. I advise that her culture is positive and it is typically indicated to treat if having any symptoms at this point. Patient states understanding states she wants to follow up with her PCP as needed.  I

## 2020-02-03 LAB
ORGANISM: ABNORMAL
URINE CULTURE, ROUTINE: ABNORMAL
URINE CULTURE, ROUTINE: ABNORMAL

## 2020-02-06 ASSESSMENT — ENCOUNTER SYMPTOMS
RHINORRHEA: 0
SHORTNESS OF BREATH: 0
COLOR CHANGE: 0
SORE THROAT: 0
PHOTOPHOBIA: 0
APNEA: 0
NAUSEA: 0
EYE DISCHARGE: 0
ABDOMINAL PAIN: 0
EYE PAIN: 0
BACK PAIN: 1
COUGH: 0
ABDOMINAL DISTENTION: 0

## 2020-03-02 ENCOUNTER — E-VISIT (OUTPATIENT)
Dept: PRIMARY CARE CLINIC | Age: 35
End: 2020-03-02
Payer: COMMERCIAL

## 2020-03-02 PROCEDURE — 98970 NQHP OL DIG ASSMT&MGMT 5-10: CPT | Performed by: NURSE PRACTITIONER

## 2020-03-02 RX ORDER — PHENAZOPYRIDINE HYDROCHLORIDE 100 MG/1
100 TABLET, FILM COATED ORAL 3 TIMES DAILY PRN
Qty: 9 TABLET | Refills: 0 | Status: SHIPPED | OUTPATIENT
Start: 2020-03-02 | End: 2021-03-02

## 2020-03-02 RX ORDER — NITROFURANTOIN 25; 75 MG/1; MG/1
100 CAPSULE ORAL 2 TIMES DAILY
Qty: 20 CAPSULE | Refills: 0 | Status: SHIPPED | OUTPATIENT
Start: 2020-03-02 | End: 2020-03-12

## 2020-05-04 ENCOUNTER — E-VISIT (OUTPATIENT)
Dept: PRIMARY CARE CLINIC | Age: 35
End: 2020-05-04
Payer: COMMERCIAL

## 2020-05-04 PROCEDURE — 98970 NQHP OL DIG ASSMT&MGMT 5-10: CPT | Performed by: NURSE PRACTITIONER

## 2020-05-04 RX ORDER — AZITHROMYCIN 250 MG/1
250 TABLET, FILM COATED ORAL SEE ADMIN INSTRUCTIONS
Qty: 6 TABLET | Refills: 0 | Status: SHIPPED | OUTPATIENT
Start: 2020-05-04 | End: 2020-05-09

## 2020-05-04 RX ORDER — METHYLPREDNISOLONE 4 MG/1
TABLET ORAL
Qty: 1 KIT | Refills: 0 | Status: SHIPPED | OUTPATIENT
Start: 2020-05-04 | End: 2021-10-14

## 2020-05-04 ASSESSMENT — LIFESTYLE VARIABLES
SMOKING_STATUS: YES
SMOKING_YEARS: 16

## 2020-05-28 NOTE — TELEPHONE ENCOUNTER
Jeramie Ngo called requesting a refill of the below medication which has been pended for you:     Requested Prescriptions     Pending Prescriptions Disp Refills    vitamin D (ERGOCALCIFEROL) 1.25 MG (66446 UT) CAPS capsule [Pharmacy Med Name: VITAMIN D2 50,000IU (ERGO) CAP RX] 8 capsule 3     Sig: TAKE 1 CAPSULE BY MOUTH 2 TIMES A WEEK       Last Appointment Date: 05/04/2020  Next Appointment Date:     No Known Allergies

## 2020-06-01 RX ORDER — ERGOCALCIFEROL 1.25 MG/1
CAPSULE ORAL
Qty: 8 CAPSULE | Refills: 3 | Status: SHIPPED | OUTPATIENT
Start: 2020-06-01 | End: 2021-11-24 | Stop reason: SDUPTHER

## 2020-07-16 ENCOUNTER — OFFICE VISIT (OUTPATIENT)
Age: 35
End: 2020-07-16

## 2020-07-16 VITALS
WEIGHT: 230 LBS | BODY MASS INDEX: 38.32 KG/M2 | OXYGEN SATURATION: 98 % | TEMPERATURE: 98.4 F | HEIGHT: 65 IN | HEART RATE: 77 BPM | RESPIRATION RATE: 14 BRPM

## 2020-07-16 PROCEDURE — 99212 OFFICE O/P EST SF 10 MIN: CPT | Performed by: NURSE PRACTITIONER

## 2020-07-16 ASSESSMENT — ENCOUNTER SYMPTOMS
ABDOMINAL PAIN: 0
NAUSEA: 0
VOMITING: 0
SHORTNESS OF BREATH: 0
RHINORRHEA: 0
DIARRHEA: 1
COUGH: 1
SORE THROAT: 0

## 2020-07-16 NOTE — PATIENT INSTRUCTIONS
Patient Education        Learning About Coronavirus [phone])  Coronavirus [phone]): Overview  What is coronavirus (FAGFJ-13)? The coronavirus disease (COVID-19) is caused by a virus. It is an illness that was first found in [place], [place], in December 2019. It has since spread worldwide. The virus can cause fever, cough, and trouble breathing. In severe cases, it can cause pneumonia and make it hard to breathe without help. It can cause death. Coronaviruses are a large group of viruses. They cause the common cold. They also cause more serious illnesses like Middle East respiratory syndrome (MERS) and severe acute respiratory syndrome (SARS). COVID-19 is caused by a novel coronavirus. That means it's a new type that has not been seen in people before. This virus spreads person-to-person through droplets from coughing and sneezing. It can also spread when you are close to someone who is infected. And it can spread when you touch something that has the virus on it, such as a doorknob or a tabletop. What can you do to protect yourself from coronavirus (COVID-19)? The best way to protect yourself from getting sick is to:  · Avoid areas where there is an outbreak. · Avoid contact with people who may be infected. · Wash your hands often with soap or alcohol-based hand sanitizers. · Avoid crowds and try to stay at least 6 feet away from other people. · Wash your hands often, especially after you cough or sneeze. Use soap and water, and scrub for at least 20 seconds. If soap and water aren't available, use an alcohol-based hand . · Avoid touching your mouth, nose, and eyes. What can you do to avoid spreading the virus to others? To help avoid spreading the virus to others:  · Cover your mouth with a tissue when you cough or sneeze. Then throw the tissue in the trash. · Use a disinfectant to clean things that you touch often. · Wear a cloth face cover if you have to go to public areas.   · Stay home Health Organization [facility]): WHO offers information about the virus outbreaks. WHO also has travel advice. www.who.int  Current as of: May 8, 2020               Content Version: 12.5  © 2006-2020 Healthwise, Incorporated. Care instructions adapted under license by [facility] ([facility]). If you have questions about a medical condition or this instruction, always ask your healthcare professional. [address] any warranty or liability for your use of this information. 1. Rest and increase fluid intake. 2. Covid-19 testing. Quarantine at home until results are called to you. If positive you will have to quarantine for 14days. If positive the health dept will also contact you. 3. Monitor for fever and treat as needed with tylenol or ibuprofen  4. If patient is not improving or developing any new/worsening symptoms then return to clinic as needed or go to ER. Patient is to follow up with PCP as needed.

## 2020-07-16 NOTE — PROGRESS NOTES
[address]  [address] [place] [place] [address]  Dept: [phone]  Dept Fax: [phone]  Loc: [phone]      Tri Turcios is c/o of Cough (x3 months) and Diarrhea (x2 days)        HPI:     Patient complains of several day(s) history of non-productive cough and diarrhea. Symptoms have been unchanged with time. She denies any other symptoms . Reports she is just here for COVID testing to be on the safe side. Relevant PMH: No pertinent PMH. Smoking history:  She  reports that she has been smoking cigarettes. She has been smoking about 0.00 packs per day. She uses smokeless tobacco.     She has had ill contacts with COVID-19. Treatment to date: none. Recent travel or possible COVID exposure:yes, Covid-19 exposure    Past Medical History:   Diagnosis Date    Anxiety     Chronic back pain     OCD (obsessive compulsive disorder)       Current Outpatient Medications   Medication Sig Dispense Refill    vitamin D (ERGOCALCIFEROL) 1.25 MG (72537 UT) CAPS capsule TAKE 1 CAPSULE BY MOUTH 2 TIMES A WEEK 8 capsule 3    methylPREDNISolone (MEDROL DOSEPACK) 4 MG tablet Take by mouth. 1 kit 0    meloxicam (MOBIC) 15 MG tablet Take 1 tablet by mouth daily 30 tablet 0    naproxen (NAPROSYN) 500 MG tablet Take 1 tablet by mouth 2 times daily as needed for Pain 60 tablet 0    albuterol sulfate (PROAIR RESPICLICK) 074 (90 Base) MCG/ACT aerosol powder inhalation Inhale 1 puff into the lungs every 6 hours as needed for Wheezing or Shortness of Breath (as needed) 1 Inhaler 3    phenazopyridine (PYRIDIUM) 100 MG tablet Take 1 tablet by mouth 3 times daily as needed for Pain 9 tablet 0     No current facility-administered medications for this visit.       No Known Allergies    Health Maintenance   Topic Date Due    Pneumococcal 0-64 years Vaccine (1 of 1 - PPSV23) 06/01/1990    HIV screen  06/01/1999    Flu vaccine (1) 09/01/2020    DTaP/Tdap/Td vaccine (2 - Td) 04/02/2022    Cervical cancer screen  03/26/2023    Hepatitis A vaccine  Aged Out    Hepatitis B vaccine  Aged Out    Hib vaccine  Aged Out    Meningococcal (ACWY) vaccine  Aged Out    Varicella vaccine  Discontinued       Subjective:      Review of Systems   Constitutional: Negative for chills, fatigue and fever. HENT: Negative for congestion, ear pain, rhinorrhea and sore throat. Respiratory: Positive for cough (x3 months). Negative for shortness of breath. Gastrointestinal: Positive for diarrhea. Negative for abdominal pain, nausea and vomiting. Skin: Negative for rash. Neurological: Negative for headaches. All other systems reviewed and are negative. Objective:     Physical Exam  Vitals signs and nursing note reviewed. Constitutional:       General: She is not in acute distress. Appearance: Normal appearance. She is well-developed. She is not ill-appearing or diaphoretic. HENT:      Head: Normocephalic and atraumatic. Right Ear: External ear normal.      Left Ear: External ear normal.      Nose: Nose normal.      Mouth/Throat:      Mouth: Mucous membranes are moist.      Pharynx: Oropharynx is clear. Eyes:      Conjunctiva/sclera: Conjunctivae normal.      Pupils: Pupils are equal, round, and reactive to light. Neck:      Musculoskeletal: Normal range of motion. Cardiovascular:      Rate and Rhythm: Normal rate and regular rhythm. Heart sounds: Normal heart sounds. No murmur. Pulmonary:      Effort: Pulmonary effort is normal. No respiratory distress. Breath sounds: Normal breath sounds. No wheezing. Skin:     General: Skin is warm and dry. Findings: No rash. Neurological:      Mental Status: She is alert and oriented to person, place, and time.    Psychiatric:         Mood and Affect: Mood normal.         Behavior: Behavior normal.       Pulse 77   Temp 98.4 °F (36.9 °C)   Resp 14   Ht 5' 5\" (1.651 m)   Wt 230 lb (104.3 kg)   SpO2 98%   BMI 38.27 kg/m²   No results found for this visit on 07/16/20. Assessment/ Plan     ASSESSMENT:         Diagnosis Orders   1. Exposure to COVID-19 virus  COVID-19   2. Diarrhea, unspecified type  COVID-19       PLAN:   1. Rest and increase fluid intake. 2. Covid-19 testing. Quarantine at home until results are called to you. If positive you will have to quarantine for 14days. If positive the health dept will also contact you. 3. Monitor for fever and treat as needed with tylenol or ibuprofen  4. If patient is not improving or developing any new/worsening symptoms then return to clinic as needed or go to ER. Patient is to follow up with PCP as needed. Patient Education        Learning About Coronavirus [phone])  Coronavirus [phone]): Overview  What is coronavirus (PCSKC-80)? The coronavirus disease (COVID-19) is caused by a virus. It is an illness that was first found in [place], [place], in December 2019. It has since spread worldwide. The virus can cause fever, cough, and trouble breathing. In severe cases, it can cause pneumonia and make it hard to breathe without help. It can cause death. Coronaviruses are a large group of viruses. They cause the common cold. They also cause more serious illnesses like Middle East respiratory syndrome (MERS) and severe acute respiratory syndrome (SARS). COVID-19 is caused by a novel coronavirus. That means it's a new type that has not been seen in people before. This virus spreads person-to-person through droplets from coughing and sneezing. It can also spread when you are close to someone who is infected. And it can spread when you touch something that has the virus on it, such as a doorknob or a tabletop. What can you do to protect yourself from coronavirus (COVID-19)? The best way to protect yourself from getting sick is to:  · Avoid areas where there is an outbreak. · Avoid contact with people who may be infected.   · Wash your hands often with soap or alcohol-based hand sanitizers. · Avoid crowds and try to stay at least 6 feet away from other people. · Wash your hands often, especially after you cough or sneeze. Use soap and water, and scrub for at least 20 seconds. If soap and water aren't available, use an alcohol-based hand . · Avoid touching your mouth, nose, and eyes. What can you do to avoid spreading the virus to others? To help avoid spreading the virus to others:  · Cover your mouth with a tissue when you cough or sneeze. Then throw the tissue in the trash. · Use a disinfectant to clean things that you touch often. · Wear a cloth face cover if you have to go to public areas. · Stay home if you are sick or have been exposed to the virus. Don't go to school, work, or public areas. And don't use public transportation, ride-shares, or taxis unless you have no choice. · If you are sick:  ? Leave your home only if you need to get medical care. But call the doctor's office first so they know you're coming. And wear a face cover. ? Wear the face cover whenever you're around other people. It can help stop the spread of the virus when you cough or sneeze. ? Clean and disinfect your home every day. Use household  and disinfectant wipes or sprays. Take special care to clean things that you grab with your hands. These include doorknobs, remote controls, phones, and handles on your refrigerator and microwave. And don't forget countertops, tabletops, bathrooms, and computer keyboards. When to call for help  [id number] anytime you think you may need emergency care. For example, call if:  · You have severe trouble breathing. (You can't talk at all.)  · You have constant chest pain or pressure. · You are severely dizzy or lightheaded. · You are confused or can't think clearly. · Your face and lips have a blue color. · You pass out (lose consciousness) or are very hard to wake up.   Call your doctor now if you develop symptoms such as:  · Shortness of breath. · Fever. · Cough. If you need to get care, call ahead to the doctor's office for instructions before you go. Make sure you wear a face cover to prevent exposing other people to the virus. Where can you get the latest information? The following health organizations are tracking and studying this virus. Their websites contain the most up-to-date information. Saritha Tyson also learn what to do if you think you may have been exposed to the virus. · U.S. Centers for Disease Control and Prevention (CDC): The CDC provides updated news about the disease and travel advice. The website also tells you how to prevent the spread of infection. www.cdc.gov  · World Health Organization [facility]): WHO offers information about the virus outbreaks. WHO also has travel advice. www.who.int  Current as of: May 8, 2020               Content Version: 12.5  © 2006-2020 Healthwise, Incorporated. Care instructions adapted under license by [facility] ([facility]). If you have questions about a medical condition or this instruction, always ask your healthcare professional. [place] [address] any warranty or liability for your use of this information. No follow-ups on file. Patient given educational materials - see patient instructions. Discussed use, benefit, and side effects of prescribed medications. All patient questions answered. Pt voiced understanding. Patient agreed with treatment plan.  Follow up as needed      Electronically signed by JOSELINE Maria on 7/16/2020 at 12:25 PM

## 2020-07-17 LAB
REPORT: NORMAL
SARS-COV-2: NOT DETECTED
THIS TEST SENT TO: NORMAL

## 2020-07-23 ENCOUNTER — TELEPHONE (OUTPATIENT)
Age: 35
End: 2020-07-23

## 2020-07-23 NOTE — TELEPHONE ENCOUNTER
Farhan Anderson MA   2020  2:44 PM       Patient verified . Patient notified of negative COVID19 test results and verbalized understanding.          Florencia Graves LPN    64:25 AM       Called pt, lm for pt to call office back. Florencia Graves LPN     6:32 PM       Called pt, lm to call office back.       JOSELINE Conner - CNP   2020 10:48 AM       Negative COVID test

## 2020-09-28 ENCOUNTER — OFFICE VISIT (OUTPATIENT)
Age: 35
End: 2020-09-28

## 2020-09-28 VITALS — TEMPERATURE: 97.5 F | HEART RATE: 80 BPM | OXYGEN SATURATION: 98 %

## 2020-09-28 PROCEDURE — 99999 PR OFFICE/OUTPT VISIT,PROCEDURE ONLY: CPT | Performed by: NURSE PRACTITIONER

## 2020-10-02 LAB — SARS-COV-2, NAA: NOT DETECTED

## 2021-03-08 ENCOUNTER — OFFICE VISIT (OUTPATIENT)
Dept: URGENT CARE | Age: 36
End: 2021-03-08
Payer: COMMERCIAL

## 2021-03-08 ENCOUNTER — OFFICE VISIT (OUTPATIENT)
Age: 36
End: 2021-03-08

## 2021-03-08 VITALS
RESPIRATION RATE: 18 BRPM | DIASTOLIC BLOOD PRESSURE: 88 MMHG | SYSTOLIC BLOOD PRESSURE: 140 MMHG | BODY MASS INDEX: 40.77 KG/M2 | WEIGHT: 245 LBS | HEART RATE: 78 BPM | TEMPERATURE: 97.4 F | OXYGEN SATURATION: 98 %

## 2021-03-08 DIAGNOSIS — Z11.59 SCREENING FOR VIRAL DISEASE: Primary | ICD-10-CM

## 2021-03-08 DIAGNOSIS — K52.9 GASTROENTERITIS: Primary | ICD-10-CM

## 2021-03-08 PROCEDURE — 99213 OFFICE O/P EST LOW 20 MIN: CPT | Performed by: NURSE PRACTITIONER

## 2021-03-08 PROCEDURE — 99999 PR OFFICE/OUTPT VISIT,PROCEDURE ONLY: CPT | Performed by: NURSE PRACTITIONER

## 2021-03-08 ASSESSMENT — ENCOUNTER SYMPTOMS
WHEEZING: 0
ABDOMINAL DISTENTION: 0
NAUSEA: 1
VOMITING: 1
ALLERGIC/IMMUNOLOGIC NEGATIVE: 1
CONSTIPATION: 0
EYE REDNESS: 0
ABDOMINAL PAIN: 0
EYES NEGATIVE: 1
RESPIRATORY NEGATIVE: 1
DIARRHEA: 1
SHORTNESS OF BREATH: 0

## 2021-03-08 NOTE — PATIENT INSTRUCTIONS
1. Covid test recommended  2. Quarantine until results are confirmed  3. Increase fluids with gatorade  4.  Follow up if symptoms worsen or fail to improve

## 2021-03-08 NOTE — PROGRESS NOTES
[address] [facility]  [address] [place] [address] [place] [address]  Dept: [phone]  Dept Fax: [phone]  Loc: [phone]     Malcom Turcios is a 38 y.o. female who presents today for her medical conditions/complaintsas noted below. Pa Turcios is c/o of Generalized Body Aches, Emesis, Diarrhea, Headache, and Cough        HPI:     HPI    Gastroenteritis: Patient complains of nonbilious vomiting several times per day, generalized pain located in throughout the abdomen, diarrhea several times per day and nausea. There is no report of acholic stools, blood in stool, dysuria, fever, hematemesis, hematuria and melena. Patient's oral intake has been decreased for liquids. Patient's urine output has been adequate. Patient denies recent travel history. Patient denies recent ingestion of possible contaminated food, toxic plants, inappropriate medications/poisons. Past Medical History:   Diagnosis Date    Anxiety     Chronic back pain     OCD (obsessive compulsive disorder)       Past Surgical History:   Procedure Laterality Date     SECTION      LUMBAR DISCECTOMY      LUMBAR LAMINECTOMY         No family history on file. Social History     Tobacco Use    Smoking status: Current Every Day Smoker     Packs/day: 0.00     Types: Cigarettes    Smokeless tobacco: Current User   Substance Use Topics    Alcohol use: Yes     Alcohol/week: 8.0 standard drinks     Types: 7 Cans of beer, 1 Shots of liquor per week      Current Outpatient Medications   Medication Sig Dispense Refill    vitamin D (ERGOCALCIFEROL) 1.25 MG (04258 UT) CAPS capsule TAKE 1 CAPSULE BY MOUTH 2 TIMES A WEEK 8 capsule 3    methylPREDNISolone (MEDROL DOSEPACK) 4 MG tablet Take by mouth.  1 kit 0    meloxicam (MOBIC) 15 MG tablet Take 1 tablet by mouth daily 30 tablet 0    naproxen (NAPROSYN) 500 MG tablet Take 1 tablet by mouth 2 times daily as needed for Pain 60 tablet 0    Mouth/Throat:      Pharynx: Uvula midline. Eyes:      Conjunctiva/sclera: Conjunctivae normal.      Pupils: Pupils are equal, round, and reactive to light. Neck:      Musculoskeletal: Normal range of motion. Thyroid: No thyroid mass or thyromegaly. Trachea: Trachea normal.   Cardiovascular:      Rate and Rhythm: Normal rate and regular rhythm. Heart sounds: Normal heart sounds. Pulmonary:      Effort: Pulmonary effort is normal.      Breath sounds: Normal breath sounds. Abdominal:      General: Bowel sounds are increased. Palpations: Abdomen is soft. Abdomen is not rigid. Tenderness: Negative signs include Zarco's sign and McBurney's sign. Musculoskeletal: Normal range of motion. Skin:     General: Skin is warm and moist.      Capillary Refill: Capillary refill takes less than 2 seconds. Neurological:      Mental Status: She is alert and oriented to person, place, and time. Psychiatric:         Behavior: Behavior normal.         Thought Content: Thought content normal.         Judgment: Judgment normal.       BP (!) 140/88   Pulse 78   Temp 97.4 °F (36.3 °C) (Temporal)   Resp 18   Wt 245 lb (111.1 kg)   SpO2 98%   BMI 40.77 kg/m²     Assessment:          Diagnosis Orders   1. Gastroenteritis         Plan:    No orders of the defined types were placed in this encounter. No follow-ups on file. No orders of the defined types were placed in this encounter. No orders of the defined types were placed in this encounter. Patient given educationalmaterials - see patient instructions. Discussed use, benefit, and side effectsof prescribed medications. All patient questions answered. Pt voiced understanding. Reviewed health maintenance. Instructed to continue current medications, diet andexercise. Patient agreed with treatment plan. Follow up as directed. Patient Instructions   1. Covid test recommended  2. Quarantine until results are confirmed  3. Increase fluids with gatorade  4.  Follow up if symptoms worsen or fail to improve        Electronically signed by JOSELINE Lozano CNP on 3/8/2021 at 10:37 AM

## 2021-03-09 LAB — SARS-COV-2, NAA: NOT DETECTED

## 2021-06-29 ENCOUNTER — E-VISIT (OUTPATIENT)
Dept: PRIMARY CARE CLINIC | Age: 36
End: 2021-06-29
Payer: COMMERCIAL

## 2021-06-29 DIAGNOSIS — N30.00 ACUTE CYSTITIS WITHOUT HEMATURIA: Primary | ICD-10-CM

## 2021-06-29 PROCEDURE — 98970 NQHP OL DIG ASSMT&MGMT 5-10: CPT | Performed by: NURSE PRACTITIONER

## 2021-06-29 RX ORDER — NITROFURANTOIN 25; 75 MG/1; MG/1
100 CAPSULE ORAL 2 TIMES DAILY
Qty: 20 CAPSULE | Refills: 0 | Status: SHIPPED | OUTPATIENT
Start: 2021-06-29 | End: 2021-07-09

## 2021-10-07 ENCOUNTER — TRANSCRIBE ORDERS (OUTPATIENT)
Dept: ADMINISTRATIVE | Facility: HOSPITAL | Age: 36
End: 2021-10-07

## 2021-10-07 ENCOUNTER — HOSPITAL ENCOUNTER (OUTPATIENT)
Dept: GENERAL RADIOLOGY | Facility: HOSPITAL | Age: 36
Discharge: HOME | End: 2021-10-07
Admitting: NURSE PRACTITIONER

## 2021-10-07 DIAGNOSIS — M54.16 LUMBAR RADICULOPATHY: Primary | ICD-10-CM

## 2021-10-07 DIAGNOSIS — M54.16 LUMBAR RADICULOPATHY: ICD-10-CM

## 2021-10-07 PROCEDURE — 72110 X-RAY EXAM L-2 SPINE 4/>VWS: CPT

## 2021-10-14 ENCOUNTER — HOSPITAL ENCOUNTER (EMERGENCY)
Age: 36
Discharge: HOME | End: 2021-10-14
Payer: COMMERCIAL

## 2021-10-14 VITALS
WEIGHT: 235 LBS | DIASTOLIC BLOOD PRESSURE: 105 MMHG | RESPIRATION RATE: 18 BRPM | HEIGHT: 65 IN | HEART RATE: 99 BPM | OXYGEN SATURATION: 100 % | SYSTOLIC BLOOD PRESSURE: 191 MMHG | TEMPERATURE: 98.7 F | BODY MASS INDEX: 39.15 KG/M2

## 2021-10-14 DIAGNOSIS — R03.0 ELEVATED BLOOD PRESSURE READING: ICD-10-CM

## 2021-10-14 DIAGNOSIS — M54.50 ACUTE EXACERBATION OF CHRONIC LOW BACK PAIN: Primary | ICD-10-CM

## 2021-10-14 DIAGNOSIS — G89.29 ACUTE EXACERBATION OF CHRONIC LOW BACK PAIN: Primary | ICD-10-CM

## 2021-10-14 PROCEDURE — 99282 EMERGENCY DEPT VISIT SF MDM: CPT

## 2021-10-14 PROCEDURE — 96372 THER/PROPH/DIAG INJ SC/IM: CPT

## 2021-10-14 PROCEDURE — 6360000002 HC RX W HCPCS: Performed by: NURSE PRACTITIONER

## 2021-10-14 RX ORDER — HYDROMORPHONE HYDROCHLORIDE 1 MG/ML
1 INJECTION, SOLUTION INTRAMUSCULAR; INTRAVENOUS; SUBCUTANEOUS ONCE
Status: COMPLETED | OUTPATIENT
Start: 2021-10-14 | End: 2021-10-14

## 2021-10-14 RX ORDER — TRAMADOL HYDROCHLORIDE 50 MG/1
50 TABLET ORAL EVERY 8 HOURS PRN
COMMUNITY

## 2021-10-14 RX ORDER — GABAPENTIN 300 MG/1
300 CAPSULE ORAL 3 TIMES DAILY
COMMUNITY
Start: 2019-10-10

## 2021-10-14 RX ORDER — CYCLOBENZAPRINE HCL 10 MG
10 TABLET ORAL 3 TIMES DAILY PRN
COMMUNITY

## 2021-10-14 RX ORDER — ORPHENADRINE CITRATE 30 MG/ML
60 INJECTION INTRAMUSCULAR; INTRAVENOUS ONCE
Status: COMPLETED | OUTPATIENT
Start: 2021-10-14 | End: 2021-10-14

## 2021-10-14 RX ORDER — HYDROCODONE BITARTRATE AND ACETAMINOPHEN 5; 325 MG/1; MG/1
1 TABLET ORAL EVERY 6 HOURS PRN
COMMUNITY
End: 2021-11-24

## 2021-10-14 RX ADMIN — HYDROMORPHONE HYDROCHLORIDE 1 MG: 1 INJECTION, SOLUTION INTRAMUSCULAR; INTRAVENOUS; SUBCUTANEOUS at 16:32

## 2021-10-14 RX ADMIN — ORPHENADRINE CITRATE 60 MG: 60 INJECTION INTRAMUSCULAR; INTRAVENOUS at 16:31

## 2021-10-14 ASSESSMENT — PAIN DESCRIPTION - DESCRIPTORS: DESCRIPTORS: BURNING;RADIATING;SHARP;SHOOTING

## 2021-10-14 ASSESSMENT — PAIN DESCRIPTION - LOCATION: LOCATION: BACK

## 2021-10-14 ASSESSMENT — PAIN DESCRIPTION - FREQUENCY: FREQUENCY: CONTINUOUS

## 2021-10-14 ASSESSMENT — PAIN SCALES - GENERAL
PAINLEVEL_OUTOF10: 8
PAINLEVEL_OUTOF10: 8

## 2021-10-14 ASSESSMENT — PAIN DESCRIPTION - PAIN TYPE
TYPE: ACUTE PAIN
TYPE: ACUTE PAIN

## 2021-10-14 ASSESSMENT — ENCOUNTER SYMPTOMS
BACK PAIN: 1
ABDOMINAL PAIN: 0

## 2021-10-14 ASSESSMENT — PAIN DESCRIPTION - ORIENTATION: ORIENTATION: LOWER

## 2021-10-14 NOTE — Clinical Note
Nisha Jeffery Karolina was seen and treated in our emergency department on 10/14/2021. She may return to work on 10/16/2021. If you have any questions or concerns, please don't hesitate to call.       Elsa Polk, APRN

## 2021-10-14 NOTE — ED PROVIDER NOTES
[facility]  eMERGENCY dEPARTMENT eNCOUnter      Pt Name: Hyacinth Turcios  MRN: [id number]  Yosephgfrosie 1985  Date of evaluation: 10/14/2021  Provider: Jessica Stone, [address]       Chief Complaint   Patient presents with    Back Pain     I bent down to pet a dog I felt a pop and my lower back is spasming and sharp shooting pain down both legs. HISTORY OF PRESENT ILLNESS   (Location/Symptom, Timing/Onset,Context/Setting, Quality, Duration, Modifying Factors, Severity)  Note limiting factors. Virginia Cheung a 39 y.o. female who presents to the emergency department for evaluation of back pain. Pt tells me that she has chronic low back pain followed by Dr. Wanda Perez and Dr. Juan Santos reporting problems with low back pain after a fall in a parking lot in 2014. She gives history of prior lumbar spine surgery in 2014. She tells me that today she bent forward to pet an animal developing worsening low back pain from baseline. She denies fall as well as any direct injury to her back. She relates that she has had new tingling/numbness in right foot. She denies abdominal pain, saddle anesthesia as well as any changes in bowel/bladder function. She denies fevers as well as lower extremity weakness. She tells me that she takes tramadol, neurontin and flexeril at home for pain. HPI    Nursing Notes were reviewed. REVIEW OF SYSTEMS    (2-9 systems for level 4, 10 or more for level 5)     Review of Systems   Constitutional: Negative for fever. Gastrointestinal: Negative for abdominal pain. Genitourinary: Negative for difficulty urinating. Musculoskeletal: Positive for back pain. Neurological: Positive for numbness (dorsal right foot). Negative for weakness. A complete review of systems was performed and is negative except as noted above in the HPI.        PAST MEDICAL HISTORY     Past Medical History:   Diagnosis Date    Anxiety     Chronic back pain     OCD (obsessive compulsive disorder)          SURGICAL HISTORY       Past Surgical History:   Procedure Laterality Date     SECTION  2003    LUMBAR DISCECTOMY      LUMBAR LAMINECTOMY           CURRENT MEDICATIONS       Previous Medications    ALBUTEROL SULFATE (PROAIR RESPICLICK) 737 (90 BASE) MCG/ACT AEROSOL POWDER INHALATION    Inhale 1 puff into the lungs every 6 hours as needed for Wheezing or Shortness of Breath (as needed)    CYCLOBENZAPRINE (FLEXERIL) 10 MG TABLET    Take 10 mg by mouth 3 times daily as needed for Muscle spasms    GABAPENTIN (NEURONTIN) 300 MG CAPSULE    Take 300 mg by mouth 3 times daily. HYDROCODONE-ACETAMINOPHEN (NORCO) 5-325 MG PER TABLET    Take 1 tablet by mouth every 6 hours as needed for Pain. NAPROXEN (NAPROSYN) 500 MG TABLET    Take 1 tablet by mouth 2 times daily as needed for Pain    TRAMADOL (ULTRAM) 50 MG TABLET    Take 50 mg by mouth every 8 hours as needed for Pain. VITAMIN D (ERGOCALCIFEROL) 1.25 MG (37120 UT) CAPS CAPSULE    TAKE 1 CAPSULE BY MOUTH 2 TIMES A WEEK       ALLERGIES     Patient has no known allergies. FAMILY HISTORY     No family history on file. SOCIAL HISTORY       Social History     Socioeconomic History    Marital status: Legally      Spouse name: Not on file    Number of children: Not on file    Years of education: Not on file    Highest education level: Not on file   Occupational History    Not on file   Tobacco Use    Smoking status: Current Every Day Smoker     Packs/day: 0.00     Types: Cigarettes    Smokeless tobacco: Current User   Vaping Use    Vaping Use: Every day    Substances: Always   Substance and Sexual Activity    Alcohol use:  Yes     Alcohol/week: 8.0 standard drinks     Types: 7 Cans of beer, 1 Shots of liquor per week    Drug use: No    Sexual activity: Not Currently   Other Topics Concern    Not on file   Social History Narrative    Not on file     Social Determinants of Health     Financial Resource Strain:     Difficulty of Paying Living Expenses:    Food Insecurity:     Worried About Running Out of Food in the Last Year:     [address] in the Last Year:    Transportation Needs:     Lack of Transportation (Medical):  Lack of Transportation (Non-Medical):    Physical Activity:     Days of Exercise per Week:     Minutes of Exercise per Session:    Stress:     Feeling of Stress :    Social Connections:     Frequency of Communication with Friends and Family:     Frequency of Social Gatherings with Friends and Family:     Attends Denominational Services:     Active Member of Clubs or Organizations:     Attends Club or Organization Meetings:     Marital Status:    Intimate Partner Violence:     Fear of Current or Ex-Partner:     Emotionally Abused:     Physically Abused:     Sexually Abused:        SCREENINGS             PHYSICAL EXAM    (up to 7 for level 4, 8 or more for level 5)       Vitals:    10/14/21 1540 10/14/21 1756   BP: (!) 191/105    Pulse: 99 99   Resp: 18 18   Temp: 98.7 °F (37.1 °C)    TempSrc: Oral    SpO2: 100%    Weight: 235 lb (106.6 kg)    Height: 5' 5\" (1.651 m)          Physical Exam  Vitals reviewed. Constitutional:       Comments: 39 yr old female appears in pain   HENT:      Head: Normocephalic. Right Ear: External ear normal.      Left Ear: External ear normal.   Eyes:      Conjunctiva/sclera: Conjunctivae normal.      Pupils: Pupils are equal, round, and reactive to light. Cardiovascular:      Rate and Rhythm: Normal rate and regular rhythm. Heart sounds: Normal heart sounds. Pulmonary:      Effort: Pulmonary effort is normal.      Breath sounds: Normal breath sounds. Musculoskeletal:         General: Normal range of motion. Cervical back: Normal range of motion. Comments: Pt ambulating in exam room   Skin:     General: Skin is warm and dry. Neurological:      Mental Status: She is alert and oriented to person, place, and time.          DIAGNOSTIC RESULTS     EKG: All EKG's are interpreted by the Emergency Department Physician who either signs or Co-signs this chart in the absence of acardiologist.        RADIOLOGY:   Non-plain film images such as CT, Ultrasound andMRI are read by the radiologist. Plain radiographic images are visualized and preliminarily interpreted by the emergency physician with the below findings:        Interpretation per the Radiologist below, if available at the time of this note:    No orders to display         ED BEDSIDE ULTRASOUND:   Performed by ED Physician - none    LABS:  Labs Reviewed - No data to display    All other labs were within normal range or not returned as of this dictation. RE-ASSESSMENT           EMERGENCY DEPARTMENT COURSE and DIFFERENTIALDIAGNOSIS/MDM:   Vitals:    Vitals:    10/14/21 1540 10/14/21 1756   BP: (!) 191/105    Pulse: 99 99   Resp: 18 18   Temp: 98.7 °F (37.1 °C)    TempSrc: Oral    SpO2: 100%    Weight: 235 lb (106.6 kg)    Height: 5' 5\" (1.651 m)        MDM      CONSULTS:  None    PROCEDURES:  Unless otherwise notedbelow, none     Procedures    FINAL IMPRESSION     1. Acute exacerbation of chronic low back pain    2.  Elevated blood pressure reading          DISPOSITION/PLAN   DISPOSITION Decision To Discharge 10/14/2021 05:45:17 PM      PATIENT REFERRED TO:  Flavia Felix DO  [address] [phone]      as scheduled    JOSELINE Elliott  [address] [facility] [address] [facility] [phone]    Schedule an appointment as soon as possible for a visit in 1 day  recheck blood pressure      DISCHARGE MEDICATIONS:       Current Discharge Medication List          (Pleasenote that portions of this note were completed with a voice recognition program.  Efforts were made to edit the dictations but occasionally words are mis-transcribed.)              JOSELINE Brooks  10/14/21 2020

## 2021-10-20 ENCOUNTER — HOSPITAL ENCOUNTER (OUTPATIENT)
Dept: MRI IMAGING | Facility: HOSPITAL | Age: 36
Discharge: HOME | End: 2021-10-20
Admitting: NURSE PRACTITIONER

## 2021-10-20 DIAGNOSIS — M54.16 LUMBAR RADICULOPATHY: ICD-10-CM

## 2021-10-20 PROCEDURE — 72148 MRI LUMBAR SPINE W/O DYE: CPT

## 2021-11-24 ENCOUNTER — OFFICE VISIT (OUTPATIENT)
Dept: PRIMARY CARE CLINIC | Age: 36
End: 2021-11-24
Payer: MEDICAID

## 2021-11-24 VITALS
WEIGHT: 242.8 LBS | BODY MASS INDEX: 40.45 KG/M2 | HEIGHT: 65 IN | HEART RATE: 81 BPM | TEMPERATURE: 97.6 F | SYSTOLIC BLOOD PRESSURE: 138 MMHG | OXYGEN SATURATION: 99 % | DIASTOLIC BLOOD PRESSURE: 86 MMHG

## 2021-11-24 DIAGNOSIS — F41.9 ANXIETY: ICD-10-CM

## 2021-11-24 DIAGNOSIS — M54.50 CHRONIC LOW BACK PAIN WITHOUT SCIATICA, UNSPECIFIED BACK PAIN LATERALITY: ICD-10-CM

## 2021-11-24 DIAGNOSIS — Z00.00 ENCOUNTER FOR PHYSICAL EXAMINATION: Primary | ICD-10-CM

## 2021-11-24 DIAGNOSIS — M72.2 PLANTAR FASCIITIS: ICD-10-CM

## 2021-11-24 DIAGNOSIS — Z72.0 TOBACCO ABUSE DISORDER: ICD-10-CM

## 2021-11-24 DIAGNOSIS — G89.29 CHRONIC LOW BACK PAIN WITHOUT SCIATICA, UNSPECIFIED BACK PAIN LATERALITY: ICD-10-CM

## 2021-11-24 PROCEDURE — 99395 PREV VISIT EST AGE 18-39: CPT | Performed by: NURSE PRACTITIONER

## 2021-11-24 PROCEDURE — 99406 BEHAV CHNG SMOKING 3-10 MIN: CPT | Performed by: NURSE PRACTITIONER

## 2021-11-24 PROCEDURE — 99213 OFFICE O/P EST LOW 20 MIN: CPT | Performed by: NURSE PRACTITIONER

## 2021-11-24 RX ORDER — ERGOCALCIFEROL 1.25 MG/1
CAPSULE ORAL
Qty: 8 CAPSULE | Refills: 3 | Status: SHIPPED | OUTPATIENT
Start: 2021-11-24

## 2021-11-24 ASSESSMENT — ENCOUNTER SYMPTOMS
VOICE CHANGE: 0
BACK PAIN: 0
NAUSEA: 0
COLOR CHANGE: 0
RHINORRHEA: 0
COUGH: 0
VOMITING: 0
PHOTOPHOBIA: 0
SHORTNESS OF BREATH: 0

## 2021-11-24 ASSESSMENT — PATIENT HEALTH QUESTIONNAIRE - PHQ9
SUM OF ALL RESPONSES TO PHQ QUESTIONS 1-9: 1
1. LITTLE INTEREST OR PLEASURE IN DOING THINGS: 0
2. FEELING DOWN, DEPRESSED OR HOPELESS: 1
SUM OF ALL RESPONSES TO PHQ QUESTIONS 1-9: 1
SUM OF ALL RESPONSES TO PHQ QUESTIONS 1-9: 1
SUM OF ALL RESPONSES TO PHQ9 QUESTIONS 1 & 2: 1

## 2021-11-24 NOTE — PROGRESS NOTES
[facility]  [address] [id number] [address] [address]  Dept: [phone]  Dept Fax: [phone]  Loc: [phone]    Melvin Turcios is a 39 y.o. female who presents today for her medical conditions/complaints as noted below. Cindy Turcios is c/o of Blood Pressure Check (BP was high while at er FOR BACK PAIN) and ED Follow-up (L3-L4)        HPI:     HPI   Chief Complaint   Patient presents with    Blood Pressure Check     BP was high while at er [facility] ED Follow-up     L3-L4       Patient presents today for physical and ED follow-up. She was seen last month for back pain. She is seeing pain management for her back pain; she receives flexeril, tramadol, and gabapentin from them. There was a concern in the ED for hypertension; however, blood pressure has been in normal range. She complains of right foot pain. She has been treating herself with nsaids, home treatments with exercises, stretches, wrapping. She states the pain is on the plantar surface. Often painful to go without shoes. Last pap was 2018. She  reports that she has been smoking cigarettes. She has been smoking about 0.00 packs per day.  She uses smokeless tobacco.      Past Medical History:   Diagnosis Date    Anxiety     Chronic back pain     OCD (obsessive compulsive disorder)       Past Surgical History:   Procedure Laterality Date     SECTION      LUMBAR DISCECTOMY      LUMBAR LAMINECTOMY         Vitals 2021 10/14/2021 10/14/2021 10/14/2021 10/14/2021 2689   SYSTOLIC 732 - - - 205 249   DIASTOLIC 86 - - - 078 88   Site - - - - - -   Position - - - - - -   Cuff Size - - - - - -   Pulse 81 99 - - 99 78   Temp 97.6 - - - 98.7 97.4   Resp - 18 - - 18 18   SpO2 99 - - - 100 98   Weight 242 lb 12.8 oz - - - 235 lb 245 lb   Height 5' 5\" - - - 5' 5\" -   Body mass index 40.4 kg/m2 - - - 39.1 kg/m2 -   Pain Level - - 8 8 - -   Some recent data might be hidden       History reviewed. No pertinent family history. Social History     Tobacco Use    Smoking status: Current Every Day Smoker     Packs/day: 0.00     Types: Cigarettes    Smokeless tobacco: Current User   Substance Use Topics    Alcohol use: Yes     Alcohol/week: 8.0 standard drinks     Types: 7 Cans of beer, 1 Shots of liquor per week      Current Outpatient Medications on File Prior to Visit   Medication Sig Dispense Refill    gabapentin (NEURONTIN) 300 MG capsule Take 300 mg by mouth 3 times daily.  cyclobenzaprine (FLEXERIL) 10 MG tablet Take 10 mg by mouth 3 times daily as needed for Muscle spasms      traMADol (ULTRAM) 50 MG tablet Take 50 mg by mouth every 8 hours as needed for Pain. No current facility-administered medications on file prior to visit. No Known Allergies    Health Maintenance   Topic Date Due    Hepatitis C screen  Never done    COVID-19 Vaccine (1) Never done    Pneumococcal 0-64 years Vaccine (1 of 2 - PPSV23) Never done    HIV screen  Never done    Flu vaccine (1) Never done    DTaP/Tdap/Td vaccine (2 - Td or Tdap) 04/02/2022    Cervical cancer screen  03/26/2023    Hepatitis A vaccine  Aged Out    Hepatitis B vaccine  Aged Out    Hib vaccine  Aged Out    Meningococcal (ACWY) vaccine  Aged Out    Varicella vaccine  Discontinued       Subjective:      Review of Systems   Constitutional: Negative for chills and fever. HENT: Negative for ear pain, hearing loss, rhinorrhea and voice change. Eyes: Negative for photophobia and visual disturbance. Respiratory: Negative for cough and shortness of breath. Cardiovascular: Negative for chest pain and palpitations. Gastrointestinal: Negative for nausea and vomiting. Endocrine: Negative. Negative for cold intolerance and heat intolerance. Genitourinary: Negative for difficulty urinating and flank pain. Musculoskeletal: Negative for back pain and neck pain.    Skin: Negative for color change and rash. Allergic/Immunologic: Negative for environmental allergies and food allergies. Neurological: Negative for dizziness, speech difficulty and headaches. Hematological: Does not bruise/bleed easily. Psychiatric/Behavioral: Negative for sleep disturbance and suicidal ideas. Objective:     Physical Exam  Vitals and nursing note reviewed. Constitutional:       Appearance: She is well-developed. HENT:      Head: Atraumatic. Right Ear: External ear normal.      Left Ear: External ear normal.      Nose: Nose normal.   Eyes:      Conjunctiva/sclera: Conjunctivae normal.      Pupils: Pupils are equal, round, and reactive to light. Cardiovascular:      Rate and Rhythm: Normal rate and regular rhythm. Heart sounds: Normal heart sounds, S1 normal and S2 normal.   Pulmonary:      Effort: Pulmonary effort is normal.      Breath sounds: Normal breath sounds. Abdominal:      General: Bowel sounds are normal.      Palpations: Abdomen is soft. Musculoskeletal:         General: Normal range of motion. Cervical back: Normal range of motion and neck supple. Skin:     General: Skin is warm and dry. Neurological:      Mental Status: She is alert and oriented to person, place, and time. Psychiatric:         Behavior: Behavior normal.       /86   Pulse 81   Temp 97.6 °F (36.4 °C) (Temporal)   Ht 5' 5\" (1.651 m)   Wt 242 lb 12.8 oz (110.1 kg)   SpO2 99%   BMI 40.40 kg/m²     Assessment:       Diagnosis Orders   1. Encounter for physical examination  CBC Auto Differential    Comprehensive Metabolic Panel    Lipid Panel    Hemoglobin A1C    Vitamin D 25 Hydroxy    TSH without Reflex   2. Chronic low back pain without sciatica, unspecified back pain laterality  CBC Auto Differential    Comprehensive Metabolic Panel    Lipid Panel    Hemoglobin A1C    Vitamin D 25 Hydroxy    TSH without Reflex   3.  Anxiety  CBC Auto Differential    Comprehensive Metabolic Panel    Lipid Panel Hemoglobin A1C    Vitamin D 25 Hydroxy    TSH without Reflex   4. Plantar fasciitis  CBC Auto Differential    Comprehensive Metabolic Panel    Lipid Panel    Hemoglobin A1C    Vitamin D 25 Hydroxy    TSH without Reflex    Amb External Referral To Orthopedic Surgery   5. Tobacco abuse disorder           Plan:   More than 50% of the time was spent counseling and coordinating care for a total time of 30 min face to face. Refer to podiatry. Fasting labs in [address]. Keep follow-up with pain management. Follow-up in 6 months or sooner if needed. Approximately 5 minutes of education was provided about quit smoking and the harms of tobacco.  Patient does show understanding. Patient does not have  the desire to quit smoking in the future. PDMP Monitoring:    Last PDMP Guillermo as Reviewed [facility]):  Review User Review Instant Review Result            Urine Drug Screenings (1 yr)    No resulted procedures found. Medication Contract and Consent for Opioid Use Documents Filed      No documents found                 Patient given educational materials -see patient instructions. Discussed use, benefit, and side effects of prescribed medications. All patient questions answered. Pt voiced understanding. Reviewed health maintenance. Instructed to continue currentmedications, diet and exercise. Patient agreed with treatment plan. Follow up as directed.      MEDICATIONS:  Orders Placed This Encounter   Medications    vitamin D (ERGOCALCIFEROL) 1.25 MG (45773 UT) CAPS capsule     Sig: TAKE 1 CAPSULE BY MOUTH 2 TIMES A WEEK     Dispense:  8 capsule     Refill:  3         ORDERS:  Orders Placed This Encounter   Procedures    CBC Auto Differential    Comprehensive Metabolic Panel    Lipid Panel    Hemoglobin A1C    Vitamin D 25 Hydroxy    TSH without Reflex    Amb External Referral To Orthopedic Surgery       Follow-up:  Return in about 1 year (around 11/24/2022) for in office, physical.    PATIENT INSTRUCTIONS:  There are no Patient Instructions on file for this visit. Electronically signed by JOSELINE Cerna on 11/24/2021 at 2:17 PM    EMR Dragon/transcription disclaimer:  Much of thisencounter note is electronic transcription/translation of spoken language to printed texts. The electronic translation of spoken language may be erroneous, or at times, nonsensical words or phrases may be inadvertentlytranscribed.   Although I have reviewed the note for such errors, some may still exist.

## 2021-12-26 ENCOUNTER — OFFICE VISIT (OUTPATIENT)
Dept: URGENT CARE | Age: 36
End: 2021-12-26
Payer: MEDICAID

## 2021-12-26 VITALS
RESPIRATION RATE: 22 BRPM | HEIGHT: 65 IN | OXYGEN SATURATION: 100 % | DIASTOLIC BLOOD PRESSURE: 82 MMHG | BODY MASS INDEX: 39.65 KG/M2 | SYSTOLIC BLOOD PRESSURE: 137 MMHG | WEIGHT: 238 LBS | TEMPERATURE: 97.2 F | HEART RATE: 116 BPM

## 2021-12-26 DIAGNOSIS — J02.9 SORE THROAT: Primary | ICD-10-CM

## 2021-12-26 DIAGNOSIS — R11.2 NAUSEA AND VOMITING, INTRACTABILITY OF VOMITING NOT SPECIFIED, UNSPECIFIED VOMITING TYPE: ICD-10-CM

## 2021-12-26 DIAGNOSIS — Z11.52 ENCOUNTER FOR SCREENING FOR COVID-19: ICD-10-CM

## 2021-12-26 DIAGNOSIS — R05.9 COUGH: ICD-10-CM

## 2021-12-26 DIAGNOSIS — R09.89 CHEST CONGESTION: ICD-10-CM

## 2021-12-26 LAB
INFLUENZA A ANTIBODY: NEGATIVE
INFLUENZA B ANTIBODY: NEGATIVE
S PYO AG THROAT QL: NORMAL
SARS-COV-2, PCR: NOT DETECTED

## 2021-12-26 PROCEDURE — 99214 OFFICE O/P EST MOD 30 MIN: CPT | Performed by: NURSE PRACTITIONER

## 2021-12-26 PROCEDURE — 87804 INFLUENZA ASSAY W/OPTIC: CPT | Performed by: NURSE PRACTITIONER

## 2021-12-26 PROCEDURE — 87880 STREP A ASSAY W/OPTIC: CPT | Performed by: NURSE PRACTITIONER

## 2021-12-26 RX ORDER — DEXAMETHASONE SODIUM PHOSPHATE 10 MG/ML
10 INJECTION INTRAMUSCULAR; INTRAVENOUS ONCE
Status: COMPLETED | OUTPATIENT
Start: 2021-12-26 | End: 2021-12-26

## 2021-12-26 RX ADMIN — DEXAMETHASONE SODIUM PHOSPHATE 10 MG: 10 INJECTION INTRAMUSCULAR; INTRAVENOUS at 14:14

## 2021-12-26 ASSESSMENT — VISUAL ACUITY: OU: 1

## 2021-12-26 ASSESSMENT — ENCOUNTER SYMPTOMS
SORE THROAT: 1
COUGH: 1
VOMITING: 1
SINUS PRESSURE: 0
SHORTNESS OF BREATH: 0
NAUSEA: 1
ABDOMINAL PAIN: 0
DIARRHEA: 1

## 2021-12-26 NOTE — PROGRESS NOTES
[address] [facility]  [address] [place] [address] Radu Vanessa [address]  Dept: [phone]  Dept Fax: [phone]  Loc: [phone]    Jerrod Turcios is a 39 y.o. female who presents today for her medical conditions/complaintsas noted below. Holland Turcios is c/o of Nausea & Vomiting, Pharyngitis, Cough, Chills, Diarrhea, and Headache        HPI:     Nausea & Vomiting  This is a new problem. The current episode started in the past 7 days (since Thursday). The problem occurs daily. The problem has been gradually improving. Associated symptoms include anorexia, chills, congestion, coughing, headaches, myalgias, nausea, a sore throat and vomiting. Pertinent negatives include no abdominal pain, arthralgias, fever or rash. Associated symptoms comments: Diarrhea. Nothing aggravates the symptoms. She has tried acetaminophen, rest, drinking and NSAIDs for the symptoms. The treatment provided mild relief. Cough  This is a new problem. The current episode started in the past 7 days (since Thursday). The problem has been unchanged. The problem occurs every few minutes. The cough is non-productive. Associated symptoms include chills, headaches, myalgias and a sore throat. Pertinent negatives include no fever, rash or shortness of breath. Associated symptoms comments: Nausea  Vomiting  Diarrhea. Nothing aggravates the symptoms. She has tried cool air, body position changes and OTC cough suppressant (Nyquil) for the symptoms. The treatment provided mild relief. Holland Anaya has been sick since Thursday. She has been working at a [employer] in [address] [place] [address] so she is unsure what she could have been exposed to. Past Medical History:   Diagnosis Date    Anxiety     Chronic back pain     OCD (obsessive compulsive disorder)      Past Surgical History:   Procedure Laterality Date     SECTION  2003    LUMBAR DISCECTOMY      LUMBAR LAMINECTOMY         History reviewed.  No pertinent family history. Social History     Tobacco Use    Smoking status: Current Every Day Smoker     Packs/day: 0.00     Types: Cigarettes    Smokeless tobacco: Current User   Substance Use Topics    Alcohol use: Yes     Alcohol/week: 8.0 standard drinks     Types: 7 Cans of beer, 1 Shots of liquor per week      Current Outpatient Medications   Medication Sig Dispense Refill    vitamin D (ERGOCALCIFEROL) 1.25 MG (13631 UT) CAPS capsule TAKE 1 CAPSULE BY MOUTH 2 TIMES A WEEK 8 capsule 3    gabapentin (NEURONTIN) 300 MG capsule Take 300 mg by mouth 3 times daily.  cyclobenzaprine (FLEXERIL) 10 MG tablet Take 10 mg by mouth 3 times daily as needed for Muscle spasms      traMADol (ULTRAM) 50 MG tablet Take 50 mg by mouth every 8 hours as needed for Pain. No current facility-administered medications for this visit. No Known Allergies    Health Maintenance   Topic Date Due    Hepatitis C screen  Never done    COVID-19 Vaccine (1) Never done    Pneumococcal 0-64 years Vaccine (1 of 2 - PPSV23) Never done    HIV screen  Never done    Flu vaccine (1) Never done    DTaP/Tdap/Td vaccine (2 - Td or Tdap) 04/02/2022    Cervical cancer screen  03/26/2023    Hepatitis A vaccine  Aged Out    Hepatitis B vaccine  Aged Out    Hib vaccine  Aged Out    Meningococcal (ACWY) vaccine  Aged Out    Varicella vaccine  Discontinued       Subjective:     Review of Systems   Constitutional: Positive for chills. Negative for activity change, appetite change and fever. HENT: Positive for congestion and sore throat. Negative for ear discharge and sinus pressure. Respiratory: Positive for cough. Negative for shortness of breath. Gastrointestinal: Positive for anorexia, diarrhea, nausea and vomiting. Negative for abdominal pain. Musculoskeletal: Positive for myalgias. Negative for arthralgias. Skin: Negative for rash. Neurological: Positive for headaches. Hematological: Negative. :Objective      Physical Exam  Vitals and nursing note reviewed. Constitutional:       General: She is awake. She is not in acute distress. Appearance: She is well-developed and well-groomed. She is obese. She is ill-appearing. HENT:      Head: Normocephalic. Right Ear: Hearing, tympanic membrane, ear canal and external ear normal.      Left Ear: Hearing, tympanic membrane, ear canal and external ear normal.      Nose: Congestion present. Right Sinus: Frontal sinus tenderness present. Left Sinus: Frontal sinus tenderness present. Mouth/Throat:      Lips: Pink. Mouth: Mucous membranes are moist.      Pharynx: Oropharynx is clear. Uvula midline. Posterior oropharyngeal erythema present. Tonsils: 0 on the right. 0 on the left. Eyes:      General: Vision grossly intact. Neck:      Trachea: Phonation normal.   Cardiovascular:      Rate and Rhythm: Regular rhythm. Tachycardia present. Heart sounds: Normal heart sounds, S1 normal and S2 normal. No murmur heard. No friction rub. No gallop. Pulmonary:      Effort: Pulmonary effort is normal. No respiratory distress. Breath sounds: Normal breath sounds and air entry. No wheezing, rhonchi or rales. Abdominal:      General: Abdomen is flat. Palpations: Abdomen is soft. Musculoskeletal:         General: No tenderness or deformity. Normal range of motion. Cervical back: Full passive range of motion without pain and neck supple. Lymphadenopathy:      Head:      Right side of head: No tonsillar adenopathy. Left side of head: No tonsillar adenopathy. Skin:     General: Skin is warm and dry. Capillary Refill: Capillary refill takes less than 2 seconds. Neurological:      General: No focal deficit present. Mental Status: She is alert, oriented to person, place, and time and easily aroused.    Psychiatric:         Attention and Perception: Attention normal.         Mood and Affect: Mood normal.         Speech: Speech normal.         Behavior: Behavior normal. Behavior is cooperative. /82   Pulse 116   Temp 97.2 °F (36.2 °C)   Resp 22   Ht 5' 5\" (1.651 m)   Wt 238 lb (108 kg)   LMP 12/26/2021   SpO2 100%   BMI 39.61 kg/m²     :Assessment       Diagnosis Orders   1. Sore throat  POCT rapid strep A    POCT Influenza A/B    COVID-19   2. Cough  COVID-19    dexamethasone (DECADRON) injection 10 mg   3. Chest congestion  COVID-19    dexamethasone (DECADRON) injection 10 mg   4. Nausea and vomiting, intractability of vomiting not specified, unspecified vomiting type  COVID-19   5. Encounter for screening for COVID-19  COVID-19       :Plan    Plenty of fluids  Rest  OTC Tylenol or Motrin as needed  Dexamethasone 10 mg IM today  COVID test today, we will call results tomorrow  Follow up with PCP or return to Urgent Care for worsening or unresolved symptoms. Orders Placed This Encounter   Procedures    COVID-19     Scheduling Instructions:      1) Due to current limited availability of the COVID-19 test, tests will be prioritized based on responses to questions above. Testing may be delayed due to volume. 2) Print and instruct patient to adhere to CDC home isolation program. (Link Above)              3) Set up or refer patient for a monitoring program.              4) Have patient sign up for and leverage MyChart (if not previously done). Order Specific Question:   Is this test for diagnosis or screening? Answer:   Diagnosis of ill patient     Order Specific Question:   Symptomatic for COVID-19 as defined by CDC? Answer:   Yes     Order Specific Question:   Date of Symptom Onset     Answer:   12/23/2021     Order Specific Question:   Hospitalized for COVID-19? Answer:   No     Order Specific Question:   Admitted to ICU for COVID-19? Answer:   No     Order Specific Question:   Employed in healthcare setting?      Answer:   Unknown     Order Specific Question:   Resident in a congregate (group) care setting? Answer:   Unknown     Order Specific Question:   Pregnant? Answer:   Unknown     Order Specific Question:   Previously tested for COVID-19? Answer: Yes    COVID-19    COVID-19    POCT rapid strep A    POCT Influenza A/B     Results for orders placed or performed in visit on 12/26/21   POCT rapid strep A   Result Value Ref Range    Strep A Ag None Detected None Detected   POCT Influenza A/B   Result Value Ref Range    Influenza A Ab negative     Influenza B Ab negative        Since pt is being tested for COVID pt has been instructed to quarantine from contacts until testing has been resulted. Further instructions will follow, as of now, this is 14 days unless otherwise specified when results are back. If SOB or worsening sx's develop, need to go to ED or return to clinic, pt voiced understanding. Pt was given printed instructions today on Possible COVID-19 infection with self-quarantine and management of symptoms    Call or return to clinic prn if these symptoms worsen or fail to improve as anticipated. No follow-ups on file. Orders Placed This Encounter   Medications    dexamethasone (DECADRON) injection 10 mg        Patient Instructions     Plenty of fluids  Rest  OTC Tylenol or Motrin as needed  Dexamethasone 10 mg IM today  COVID test today, we will call results tomorrow  Follow up with PCP or return to Urgent Care for worsening or unresolved symptoms. Patient Education        Learning About Coronavirus [phone])  What is coronavirus (COVID-19)? COVID-19 is a disease caused by a type of coronavirus. This illness was first found in December 2019. It has since spread worldwide. Coronaviruses are a large group of viruses. They cause the common cold. They also cause more serious illnesses like [place] East respiratory syndrome (MERS) and severe acute respiratory syndrome (SARS).  COVID-19 is caused by a novel coronavirus. That means it's a new type that has not been seen in people before. What are the symptoms? COVID-19 symptoms may include:  · Fever. · Cough. · Trouble breathing. · Chills or repeated shaking with chills. · Muscle and body aches. · Headache. · Sore throat. · New loss of taste or smell. · Vomiting. · Diarrhea. In severe cases, COVID-19 can cause pneumonia and make it hard to breathe without help from a machine. It can cause death. How is it diagnosed? COVID-19 is diagnosed with a viral test. This may also be called a PCR test or antigen test. It looks for evidence of the virus in your breathing passages or lungs (respiratory system). The test is most often done on a sample from the nose, throat, or lungs. It's sometimes done on a sample of saliva. One way a sample is collected is by putting a long swab into the back of your nose. How is it treated? Mild cases of COVID-19 can be treated at home. Serious cases need treatment in the hospital. Treatment may include medicines to reduce symptoms, plus breathing support such as oxygen therapy or a ventilator. Some people may be placed on their belly to help their oxygen levels. Treatments that may help people who have COVID-19 include:  Antiviral medicines. These medicines treat viral infections. Remdesivir is an example. Immune-based therapy. These medicines help the immune system fight COVID-19. Examples include monoclonal antibodies. Blood thinners. These medicines help prevent blood clots. People with severe illness are at risk for blood clots. How can you protect yourself and others? · Get vaccinated. · Avoid sick people. · Cover your mouth with a tissue when you cough or sneeze. · Wash your hands often, especially after you cough or sneeze. Use soap and water, and scrub for at least 20 seconds. If soap and water aren't available, use an alcohol-based hand . · Avoid touching your mouth, nose, and eyes.   Be sure to follow all instructions from the [facility] and your local health authorities. Here are some examples of specific precautions you may need to take. · If you are not fully vaccinated:  ? Wear a mask if you have to go to public areas. ? Avoid crowds and try to stay at least 6 feet away from other people. · If you have been exposed to the virus and are not fully vaccinated:  ? Stay home. Don't go to school, work, or public areas. And don't use public transportation, ride-shares, or taxis unless you have no choice. ? Wear a mask if you have to go to public areas, like the pharmacy. · Even if you're fully vaccinated, there's still a small chance you can get and spread COVID-19. If you live in an area where COVID-19 is spreading quickly, wear a mask if you have to go to indoor public areas. You might also want to wear a mask in crowded outdoor areas if you:  ? Have certain health conditions. ? Live with someone who has a compromised immune system. ? Live with someone who is not fully vaccinated. · If you have been exposed and you are fully vaccinated:  ? Talk to your doctor. You may need a COVID-19 test.  ? Wear a mask in public indoor spaces for 14 days or until you test negative for COVID-19. If you're sick:  · Leave your home only if you need to get medical care. But call the doctor's office first so they know you're coming. And wear a mask. · Wear a mask whenever you're around other people. · Limit contact with pets and people in your home. If possible, stay in a separate bedroom and use a separate bathroom. · Clean and disinfect your home every day. Use household  and disinfectant wipes or sprays. Take special care to clean things that you touch with your hands. How can you self-isolate when you have COVID-19? If you have COVID-19, there are things you can do to help avoid spreading the virus to others. · Limit contact with people in your home.  If possible, stay in a separate bedroom and use a separate bathroom. · Wear a mask when you are around other people. · If you have to leave home, avoid crowds and try to stay at least 6 feet away from other people. · Avoid contact with pets and other animals. · Cover your mouth and nose with a tissue when you cough or sneeze. Then throw it in the trash right away. · Wash your hands often, especially after you cough or sneeze. Use soap and water, and scrub for at least 20 seconds. If soap and water aren't available, use an alcohol-based hand . · Don't share personal household items. These include bedding, towels, cups and glasses, and eating utensils. · [address] in the warmest water allowed for the fabric type, and dry it completely. It's okay to wash other people's laundry with yours. · Clean and disinfect your home. Use household  and disinfectant wipes or sprays. When should you call for help? Call 911 anytime you think you may need emergency care. For example, call if you have life-threatening symptoms, such as:    · You have severe trouble breathing. (You can't talk at all.)     · You have constant chest pain or pressure.     · You are severely dizzy or lightheaded.     · You are confused or can't think clearly.     · You have pale, gray, or blue-colored skin or lips.     · You pass out (lose consciousness) or are very hard to wake up. Call your doctor now or seek immediate medical care if:    · You have moderate trouble breathing. (You can't speak a full sentence.)     · You are coughing up blood (more than about 1 teaspoon).     · You have signs of low blood pressure. These include feeling lightheaded; being too weak to stand; and having cold, pale, clammy skin. Watch closely for changes in your health, and be sure to contact your doctor if:    · Your symptoms get worse.     · You are not getting better as expected.     · You have new or worse symptoms of anxiety, depression, nightmares, or flashbacks.    Call before you go to the doctor's office. Follow their instructions. And wear a mask. Current as of: July 1, 2021               Content Version: 13.1  © 2006-2021 [employer]. Care instructions adapted under license by [facility] [place] ([facility]). If you have questions about a medical condition or this instruction, always ask your healthcare professional. [address] any warranty or liability for your use of this information. Patient Education        Coronavirus ([id number]): Care Instructions  Overview  The coronavirus disease (COVID-19) is caused by a virus. Symptoms may include a fever, a cough, and shortness of breath. It can spread through droplets from coughing and sneezing, breathing, and singing. The virus also can spread when people are in close contact with someone who is infected. Most people have mild symptoms and can take care of themselves at home. If their symptoms get worse, they may need care in a hospital. Treatment may include medicines to reduce symptoms, plus breathing support such as oxygen therapy or a ventilator. It's important to not spread the virus to others. If you have COVID-19, wear a mask anytime you are around other people. It can help stop the spread of the virus. You need to isolate yourself while you are sick. Leave your home only if you need to get medical care or testing. Follow-up care is a key part of your treatment and safety. Be sure to make and go to all appointments, and call your doctor if you are having problems. It's also a good idea to know your test results and keep a list of the medicines you take. How can you care for yourself at home? · Get extra rest. It can help you feel better. · Drink plenty of fluids. This helps replace fluids lost from fever. Fluids may also help ease a scratchy throat. · You can take acetaminophen (Tylenol) or ibuprofen (Advil, Motrin) to reduce a fever. It may also help with muscle and body aches.  Read and follow all instructions on the label. · Use petroleum jelly on sore skin. This can help if the skin around your nose and lips becomes sore from rubbing a lot with tissues. If you use oxygen, use a water-based product instead of petroleum jelly. · Keep track of symptoms such as fever and shortness of breath. This can help you know if you need to call your doctor. It can also help you know when it's safe to be around other people. · In some cases, your doctor might suggest that you get a pulse oximeter. How can you self-isolate when you have COVID-19? If you have COVID-19, there are things you can do to help avoid spreading the virus to others. · Limit contact with people in your home. If possible, stay in a separate bedroom and use a separate bathroom. · Wear a mask when you are around other people. · If you have to leave home, avoid crowds and try to stay at least 6 feet away from other people. · Avoid contact with pets and other animals. · Cover your mouth and nose with a tissue when you cough or sneeze. Then throw it in the trash right away. · Wash your hands often, especially after you cough or sneeze. Use soap and water, and scrub for at least 20 seconds. If soap and water aren't available, use an alcohol-based hand . · Don't share personal household items. These include bedding, towels, cups and glasses, and eating utensils. · [address] in the warmest water allowed for the fabric type, and dry it completely. It's okay to wash other people's laundry with yours. · Clean and disinfect your home. Use household  and disinfectant wipes or sprays. When can you end self-isolation for COVID-19? If you know or think that you have the virus, you will need to self-isolate. You can be around others after:  · It's been at least 10 days since your symptoms started and  · You haven't had a fever for 24 hours without taking medicines to lower the fever and  · Your symptoms are improving.   If you tested positive but have no symptoms, you can end isolation after 10 days. But if you start to have symptoms, follow the steps above. Ask your doctor if you need to be tested before you end isolation. This is especially important if you have a weakened immune system. When should you call for help? Call 911 anytime you think you may need emergency care. For example, call if you have life-threatening symptoms, such as:    · You have severe trouble breathing. (You can't talk at all.)     · You have constant chest pain or pressure.     · You are severely dizzy or lightheaded.     · You are confused or can't think clearly.     · You have pale, gray, or blue-colored skin or lips.     · You pass out (lose consciousness) or are very hard to wake up. Call your doctor now or seek immediate medical care if:    · You have moderate trouble breathing. (You can't speak a full sentence.)     · You are coughing up blood (more than about 1 teaspoon).     · You have signs of low blood pressure. These include feeling lightheaded; being too weak to stand; and having cold, pale, clammy skin. Watch closely for changes in your health, and be sure to contact your doctor if:    · Your symptoms get worse.     · You are not getting better as expected.     · You have new or worse symptoms of anxiety, depression, nightmares, or flashbacks. Call before you go to the doctor's office. Follow their instructions. And wear a mask. Current as of: July 1, 2021               Content Version: 13.1  © 2006-2021 [employer]. Care instructions adapted under license by [facility] ([facility]). If you have questions about a medical condition or this instruction, always ask your healthcare professional. [place] [address] any warranty or liability for your use of this information. Patient given educational materials- see patient instructions. Discussed use, benefit, and side effects of prescribedmedications.   All patient questions answered. Pt voiced understanding.        Electronically signed by JOSELINE Blackwell CNP on 12/26/2021 at 2:18 PM

## 2021-12-26 NOTE — PATIENT INSTRUCTIONS
Plenty of fluids  Rest  OTC Tylenol or Motrin as needed  Dexamethasone 10 mg IM today  COVID test today, we will call results tomorrow  Follow up with PCP or return to Urgent Care for worsening or unresolved symptoms. Patient Education        Learning About Coronavirus [phone])  What is coronavirus (COVID-19)? COVID-19 is a disease caused by a type of coronavirus. This illness was first found in December 2019. It has since spread worldwide. Coronaviruses are a large group of viruses. They cause the common cold. They also cause more serious illnesses like [place] respiratory syndrome (MERS) and severe acute respiratory syndrome (SARS). COVID-19 is caused by a novel coronavirus. That means it's a new type that has not been seen in people before. What are the symptoms? COVID-19 symptoms may include:  · Fever. · Cough. · Trouble breathing. · Chills or repeated shaking with chills. · Muscle and body aches. · Headache. · Sore throat. · New loss of taste or smell. · Vomiting. · Diarrhea. In severe cases, COVID-19 can cause pneumonia and make it hard to breathe without help from a machine. It can cause death. How is it diagnosed? COVID-19 is diagnosed with a viral test. This may also be called a PCR test or antigen test. It looks for evidence of the virus in your breathing passages or lungs (respiratory system). The test is most often done on a sample from the nose, throat, or lungs. It's sometimes done on a sample of saliva. One way a sample is collected is by putting a long swab into the back of your nose. How is it treated? Mild cases of COVID-19 can be treated at home. Serious cases need treatment in the hospital. Treatment may include medicines to reduce symptoms, plus breathing support such as oxygen therapy or a ventilator. Some people may be placed on their belly to help their oxygen levels. Treatments that may help people who have COVID-19 include:  Antiviral medicines.   These medicines treat viral infections. Remdesivir is an example. Immune-based therapy. These medicines help the immune system fight COVID-19. Examples include monoclonal antibodies. Blood thinners. These medicines help prevent blood clots. People with severe illness are at risk for blood clots. How can you protect yourself and others? · Get vaccinated. · Avoid sick people. · Cover your mouth with a tissue when you cough or sneeze. · Wash your hands often, especially after you cough or sneeze. Use soap and water, and scrub for at least 20 seconds. If soap and water aren't available, use an alcohol-based hand . · Avoid touching your mouth, nose, and eyes. Be sure to follow all instructions from the [facility] and your local health authorities. Here are some examples of specific precautions you may need to take. · If you are not fully vaccinated:  ? Wear a mask if you have to go to public areas. ? Avoid crowds and try to stay at least 6 feet away from other people. · If you have been exposed to the virus and are not fully vaccinated:  ? Stay home. Don't go to school, work, or public areas. And don't use public transportation, ride-shares, or taxis unless you have no choice. ? Wear a mask if you have to go to public areas, like the pharmacy. · Even if you're fully vaccinated, there's still a small chance you can get and spread COVID-19. If you live in an area where COVID-19 is spreading quickly, wear a mask if you have to go to indoor public areas. You might also want to wear a mask in crowded outdoor areas if you:  ? Have certain health conditions. ? Live with someone who has a compromised immune system. ? Live with someone who is not fully vaccinated. · If you have been exposed and you are fully vaccinated:  ? Talk to your doctor. You may need a COVID-19 test.  ? Wear a mask in public indoor spaces for 14 days or until you test negative for COVID-19.   If you're sick:  · Leave your home only if you need to get medical care. But call the doctor's office first so they know you're coming. And wear a mask. · Wear a mask whenever you're around other people. · Limit contact with pets and people in your home. If possible, stay in a separate bedroom and use a separate bathroom. · Clean and disinfect your home every day. Use household  and disinfectant wipes or sprays. Take special care to clean things that you touch with your hands. How can you self-isolate when you have COVID-19? If you have COVID-19, there are things you can do to help avoid spreading the virus to others. · Limit contact with people in your home. If possible, stay in a separate bedroom and use a separate bathroom. · Wear a mask when you are around other people. · If you have to leave home, avoid crowds and try to stay at least 6 feet away from other people. · Avoid contact with pets and other animals. · Cover your mouth and nose with a tissue when you cough or sneeze. Then throw it in the trash right away. · Wash your hands often, especially after you cough or sneeze. Use soap and water, and scrub for at least 20 seconds. If soap and water aren't available, use an alcohol-based hand . · Don't share personal household items. These include bedding, towels, cups and glasses, and eating utensils. · [address] in the warmest water allowed for the fabric type, and dry it completely. It's okay to wash other people's laundry with yours. · Clean and disinfect your home. Use household  and disinfectant wipes or sprays. When should you call for help? Call 911 anytime you think you may need emergency care. For example, call if you have life-threatening symptoms, such as:    · You have severe trouble breathing.  (You can't talk at all.)     · You have constant chest pain or pressure.     · You are severely dizzy or lightheaded.     · You are confused or can't think clearly.     · You have pale, gray, or blue-colored skin or lips.     · You pass out (lose consciousness) or are very hard to wake up. Call your doctor now or seek immediate medical care if:    · You have moderate trouble breathing. (You can't speak a full sentence.)     · You are coughing up blood (more than about 1 teaspoon).     · You have signs of low blood pressure. These include feeling lightheaded; being too weak to stand; and having cold, pale, clammy skin. Watch closely for changes in your health, and be sure to contact your doctor if:    · Your symptoms get worse.     · You are not getting better as expected.     · You have new or worse symptoms of anxiety, depression, nightmares, or flashbacks. Call before you go to the doctor's office. Follow their instructions. And wear a mask. Current as of: July 1, 2021               Content Version: 13.1  © 2006-2021 [employer]. Care instructions adapted under license by [facility] ([facility]). If you have questions about a medical condition or this instruction, always ask your healthcare professional. [place] [address] any warranty or liability for your use of this information. Patient Education        Coronavirus (LWNLS-31): Care Instructions  Overview  The coronavirus disease (COVID-19) is caused by a virus. Symptoms may include a fever, a cough, and shortness of breath. It can spread through droplets from coughing and sneezing, breathing, and singing. The virus also can spread when people are in close contact with someone who is infected. Most people have mild symptoms and can take care of themselves at home. If their symptoms get worse, they may need care in a hospital. Treatment may include medicines to reduce symptoms, plus breathing support such as oxygen therapy or a ventilator. It's important to not spread the virus to others. If you have COVID-19, wear a mask anytime you are around other people. It can help stop the spread of the virus.  You need to isolate yourself while you are sick. Leave your home only if you need to get medical care or testing. Follow-up care is a key part of your treatment and safety. Be sure to make and go to all appointments, and call your doctor if you are having problems. It's also a good idea to know your test results and keep a list of the medicines you take. How can you care for yourself at home? · Get extra rest. It can help you feel better. · Drink plenty of fluids. This helps replace fluids lost from fever. Fluids may also help ease a scratchy throat. · You can take acetaminophen (Tylenol) or ibuprofen (Advil, Motrin) to reduce a fever. It may also help with muscle and body aches. Read and follow all instructions on the label. · Use petroleum jelly on sore skin. This can help if the skin around your nose and lips becomes sore from rubbing a lot with tissues. If you use oxygen, use a water-based product instead of petroleum jelly. · Keep track of symptoms such as fever and shortness of breath. This can help you know if you need to call your doctor. It can also help you know when it's safe to be around other people. · In some cases, your doctor might suggest that you get a pulse oximeter. How can you self-isolate when you have COVID-19? If you have COVID-19, there are things you can do to help avoid spreading the virus to others. · Limit contact with people in your home. If possible, stay in a separate bedroom and use a separate bathroom. · Wear a mask when you are around other people. · If you have to leave home, avoid crowds and try to stay at least 6 feet away from other people. · Avoid contact with pets and other animals. · Cover your mouth and nose with a tissue when you cough or sneeze. Then throw it in the trash right away. · Wash your hands often, especially after you cough or sneeze. Use soap and water, and scrub for at least 20 seconds. If soap and water aren't available, use an alcohol-based hand .   · Don't share personal household items. These include bedding, towels, cups and glasses, and eating utensils. · [address] in the warmest water allowed for the fabric type, and dry it completely. It's okay to wash other people's laundry with yours. · Clean and disinfect your home. Use household  and disinfectant wipes or sprays. When can you end self-isolation for COVID-19? If you know or think that you have the virus, you will need to self-isolate. You can be around others after:  · It's been at least 10 days since your symptoms started and  · You haven't had a fever for 24 hours without taking medicines to lower the fever and  · Your symptoms are improving. If you tested positive but have no symptoms, you can end isolation after 10 days. But if you start to have symptoms, follow the steps above. Ask your doctor if you need to be tested before you end isolation. This is especially important if you have a weakened immune system. When should you call for help? Call 911 anytime you think you may need emergency care. For example, call if you have life-threatening symptoms, such as:    · You have severe trouble breathing. (You can't talk at all.)     · You have constant chest pain or pressure.     · You are severely dizzy or lightheaded.     · You are confused or can't think clearly.     · You have pale, gray, or blue-colored skin or lips.     · You pass out (lose consciousness) or are very hard to wake up. Call your doctor now or seek immediate medical care if:    · You have moderate trouble breathing. (You can't speak a full sentence.)     · You are coughing up blood (more than about 1 teaspoon).     · You have signs of low blood pressure. These include feeling lightheaded; being too weak to stand; and having cold, pale, clammy skin.    Watch closely for changes in your health, and be sure to contact your doctor if:    · Your symptoms get worse.     · You are not getting better as expected.     · You have new or worse symptoms of anxiety, depression, nightmares, or flashbacks. Call before you go to the doctor's office. Follow their instructions. And wear a mask. Current as of: July 1, 2021               Content Version: 13.1  © 2006-2021 [employer]. Care instructions adapted under license by [facility] ([facility]). If you have questions about a medical condition or this instruction, always ask your healthcare professional. [place] [address] any warranty or liability for your use of this information.

## 2021-12-29 ENCOUNTER — OFFICE VISIT (OUTPATIENT)
Dept: URGENT CARE | Age: 36
End: 2021-12-29

## 2021-12-29 ENCOUNTER — OFFICE VISIT (OUTPATIENT)
Dept: PRIMARY CARE CLINIC | Age: 36
End: 2021-12-29
Payer: MEDICAID

## 2021-12-29 VITALS
DIASTOLIC BLOOD PRESSURE: 80 MMHG | OXYGEN SATURATION: 98 % | HEIGHT: 65 IN | BODY MASS INDEX: 40.32 KG/M2 | WEIGHT: 242 LBS | SYSTOLIC BLOOD PRESSURE: 124 MMHG | HEART RATE: 86 BPM | TEMPERATURE: 96.8 F

## 2021-12-29 DIAGNOSIS — Z53.21 PATIENT LEFT WITHOUT BEING SEEN: Primary | ICD-10-CM

## 2021-12-29 DIAGNOSIS — J20.9 ACUTE BRONCHITIS WITH BRONCHOSPASM: Primary | ICD-10-CM

## 2021-12-29 DIAGNOSIS — F17.200 TOBACCO DEPENDENCE: ICD-10-CM

## 2021-12-29 PROCEDURE — 99213 OFFICE O/P EST LOW 20 MIN: CPT | Performed by: FAMILY MEDICINE

## 2021-12-29 PROCEDURE — 94640 AIRWAY INHALATION TREATMENT: CPT | Performed by: FAMILY MEDICINE

## 2021-12-29 PROCEDURE — 99999 PR OFFICE/OUTPT VISIT,PROCEDURE ONLY: CPT | Performed by: NURSE PRACTITIONER

## 2021-12-29 RX ORDER — AZITHROMYCIN 250 MG/1
250 TABLET, FILM COATED ORAL SEE ADMIN INSTRUCTIONS
Qty: 6 TABLET | Refills: 0 | Status: SHIPPED | OUTPATIENT
Start: 2021-12-29 | End: 2022-01-03

## 2021-12-29 RX ORDER — METHYLPREDNISOLONE 4 MG/1
TABLET ORAL
Qty: 1 KIT | Refills: 0 | Status: SHIPPED | OUTPATIENT
Start: 2021-12-29 | End: 2022-01-17

## 2021-12-29 RX ORDER — ALBUTEROL SULFATE 2.5 MG/3ML
2.5 SOLUTION RESPIRATORY (INHALATION) ONCE
Status: COMPLETED | OUTPATIENT
Start: 2021-12-29 | End: 2021-12-29

## 2021-12-29 RX ORDER — METHYLPREDNISOLONE SODIUM SUCCINATE 40 MG/ML
40 INJECTION, POWDER, LYOPHILIZED, FOR SOLUTION INTRAMUSCULAR; INTRAVENOUS ONCE
Status: COMPLETED | OUTPATIENT
Start: 2021-12-29 | End: 2021-12-29

## 2021-12-29 RX ORDER — DEXTROMETHORPHAN HYDROBROMIDE AND PROMETHAZINE HYDROCHLORIDE 15; 6.25 MG/5ML; MG/5ML
5 SYRUP ORAL 4 TIMES DAILY PRN
Qty: 100 ML | Refills: 0 | Status: SHIPPED | OUTPATIENT
Start: 2021-12-29 | End: 2022-01-05

## 2021-12-29 RX ADMIN — METHYLPREDNISOLONE SODIUM SUCCINATE 40 MG: 40 INJECTION, POWDER, LYOPHILIZED, FOR SOLUTION INTRAMUSCULAR; INTRAVENOUS at 14:17

## 2021-12-29 RX ADMIN — ALBUTEROL SULFATE 2.5 MG: 2.5 SOLUTION RESPIRATORY (INHALATION) at 13:47

## 2021-12-29 ASSESSMENT — PATIENT HEALTH QUESTIONNAIRE - PHQ9
SUM OF ALL RESPONSES TO PHQ QUESTIONS 1-9: 0
1. LITTLE INTEREST OR PLEASURE IN DOING THINGS: 0
2. FEELING DOWN, DEPRESSED OR HOPELESS: 0
SUM OF ALL RESPONSES TO PHQ9 QUESTIONS 1 & 2: 0
DEPRESSION UNABLE TO ASSESS: FUNCTIONAL CAPACITY MOTIVATION LIMITS ACCURACY
SUM OF ALL RESPONSES TO PHQ QUESTIONS 1-9: 0
SUM OF ALL RESPONSES TO PHQ QUESTIONS 1-9: 0

## 2021-12-29 ASSESSMENT — ENCOUNTER SYMPTOMS
COUGH: 1
EYES NEGATIVE: 1
RHINORRHEA: 1
CHEST TIGHTNESS: 1

## 2021-12-29 NOTE — PROGRESS NOTES
[facility] PRIMARY [facility]  [address] [place] [address]  [address] [address]  Dept: [phone]  Dept Fax: [phone]  Loc: [phone]      Subjective:     Chief Complaint   Patient presents with    Nasal Congestion     tested negative for covid, flu and strep on  and it was all negative.  Generalized Body Aches    Cough       HPI:  Eloisa Turcios is a 39 y.o. female presents with above symptoms x 4 days. Pt states cough is worse. Pt is a 2 pack smoker. She states she has not been smoking as much lately. ROS:   Review of Systems   Constitutional: Negative for activity change, appetite change and fever. HENT: Positive for congestion and rhinorrhea. Eyes: Negative. Respiratory: Positive for cough and chest tightness. Genitourinary: Negative. Musculoskeletal: Positive for myalgias. All other systems reviewed and are negative. PMHx:  Past Medical History:   Diagnosis Date    Anxiety     Chronic back pain     OCD (obsessive compulsive disorder)      Patient Active Problem List   Diagnosis    Anxiety       PSHx:  Past Surgical History:   Procedure Laterality Date     SECTION  2003    LUMBAR DISCECTOMY      LUMBAR LAMINECTOMY         PFHx:  No family history on file. SocialHx:  Social History     Tobacco Use    Smoking status: Current Every Day Smoker     Packs/day: 0.00     Types: Cigarettes    Smokeless tobacco: Current User   Substance Use Topics    Alcohol use:  Yes     Alcohol/week: 8.0 standard drinks     Types: 7 Cans of beer, 1 Shots of liquor per week       Allergies:  No Known Allergies    Medications:  Current Outpatient Medications   Medication Sig Dispense Refill    promethazine-dextromethorphan (PROMETHAZINE-DM) 6.25-15 MG/5ML syrup Take 5 mLs by mouth 4 times daily as needed for Cough 100 mL 0    azithromycin (ZITHROMAX) 250 MG tablet Take 1 tablet by mouth See Admin Instructions for 5 days 500mg on day 1 followed by 250mg on days 2 - 5 6 tablet 0    methylPREDNISolone (MEDROL DOSEPACK) 4 MG tablet Take by mouth. 1 kit 0    vitamin D (ERGOCALCIFEROL) 1.25 MG (51283 UT) CAPS capsule TAKE 1 CAPSULE BY MOUTH 2 TIMES A WEEK 8 capsule 3    gabapentin (NEURONTIN) 300 MG capsule Take 300 mg by mouth 3 times daily.  cyclobenzaprine (FLEXERIL) 10 MG tablet Take 10 mg by mouth 3 times daily as needed for Muscle spasms      traMADol (ULTRAM) 50 MG tablet Take 50 mg by mouth every 8 hours as needed for Pain. Current Facility-Administered Medications   Medication Dose Route Frequency Provider Last Rate Last Admin    methylPREDNISolone sodium (SOLU-MEDROL) injection 40 mg  40 mg IntraVENous Once Nikole Valdes MD           Objective:   PE:  /80   Pulse 86   Temp 96.8 °F (36 °C)   Ht 5' 5\" (1.651 m)   Wt 242 lb (109.8 kg)   LMP 12/26/2021   SpO2 98%   BMI 40.27 kg/m²   Physical Exam  Vitals and nursing note reviewed. Constitutional:       Appearance: Normal appearance. She is obese. She is ill-appearing ( mildly). HENT:      Nose: Congestion and rhinorrhea present. Mouth/Throat:      Mouth: Mucous membranes are moist.      Pharynx: Posterior oropharyngeal erythema present. Eyes:      Extraocular Movements: Extraocular movements intact. Conjunctiva/sclera: Conjunctivae normal.      Pupils: Pupils are equal, round, and reactive to light. Cardiovascular:      Rate and Rhythm: Normal rate and regular rhythm. Heart sounds: Normal heart sounds. Pulmonary:      Effort: No respiratory distress. Breath sounds: Rhonchi present. No wheezing. Abdominal:      Palpations: Abdomen is soft. Tenderness: There is no abdominal tenderness. Musculoskeletal:      Cervical back: Neck supple. Skin:     General: Skin is warm and dry. Findings: No rash. Neurological:      Mental Status: She is alert and oriented to person, place, and time.             Assessment & Plan   [facility] congestion, generalized body aches and cough. Diagnoses and all orders for this visit:    Acute bronchitis with bronchospasm  -     albuterol (PROVENTIL) nebulizer solution 2.5 mg  -     promethazine-dextromethorphan (PROMETHAZINE-DM) 6.25-15 MG/5ML syrup; Take 5 mLs by mouth 4 times daily as needed for Cough  -     methylPREDNISolone sodium (SOLU-MEDROL) injection 40 mg  -     azithromycin (ZITHROMAX) 250 MG tablet; Take 1 tablet by mouth See Admin Instructions for 5 days 500mg on day 1 followed by 250mg on days 2 - 5  -     methylPREDNISolone (MEDROL DOSEPACK) 4 MG tablet; Take by mouth. Tobacco dependence    Increase oral fluids  Take meds as instructed  D/C smoking    Return in 19 days (on 1/17/2022) for follow-up recent visit with Ameya Bazzi. All questions were answered. Medications, including possible adverse effects, and instructions were reviewed and  understanding was confirmed. Follow-up recommendations, including when to contact or return to office (ie; if symptoms worsen or fail to improve), were discussed and acknowledged.     Electronically signed by Carlyle Jaimes MD on 12/29/21 at 1:36 PM CST

## 2021-12-29 NOTE — PROGRESS NOTES
After obtaining consent, and per orders of Dr. Prasanth Young, injection of Solu-medrol 40 mg given in Left upper quad. gluteus by Samuel Fair. Patient instructed to remain in clinic for 20 minutes afterwards, and to report any adverse reaction to me immediately.

## 2022-01-17 ENCOUNTER — OFFICE VISIT (OUTPATIENT)
Dept: PRIMARY CARE CLINIC | Age: 37
End: 2022-01-17
Payer: MEDICAID

## 2022-01-17 VITALS
DIASTOLIC BLOOD PRESSURE: 78 MMHG | BODY MASS INDEX: 40.15 KG/M2 | TEMPERATURE: 97.8 F | SYSTOLIC BLOOD PRESSURE: 126 MMHG | HEART RATE: 98 BPM | WEIGHT: 241 LBS | OXYGEN SATURATION: 98 % | HEIGHT: 65 IN

## 2022-01-17 DIAGNOSIS — M54.50 CHRONIC LOW BACK PAIN WITHOUT SCIATICA, UNSPECIFIED BACK PAIN LATERALITY: ICD-10-CM

## 2022-01-17 DIAGNOSIS — Z00.00 ENCOUNTER FOR PHYSICAL EXAMINATION: ICD-10-CM

## 2022-01-17 DIAGNOSIS — F41.9 ANXIETY: ICD-10-CM

## 2022-01-17 DIAGNOSIS — G89.29 CHRONIC LOW BACK PAIN WITHOUT SCIATICA, UNSPECIFIED BACK PAIN LATERALITY: ICD-10-CM

## 2022-01-17 DIAGNOSIS — Z76.89 ENCOUNTER FOR WEIGHT MANAGEMENT: Primary | ICD-10-CM

## 2022-01-17 DIAGNOSIS — M72.2 PLANTAR FASCIITIS: ICD-10-CM

## 2022-01-17 LAB
ALBUMIN SERPL-MCNC: 3.9 G/DL (ref 3.5–5.2)
ALP BLD-CCNC: 74 U/L (ref 35–104)
ALT SERPL-CCNC: 19 U/L (ref 5–33)
ANION GAP SERPL CALCULATED.3IONS-SCNC: 12 MMOL/L (ref 7–19)
AST SERPL-CCNC: 14 U/L (ref 5–32)
BASOPHILS ABSOLUTE: 0.1 K/UL (ref 0–0.2)
BASOPHILS RELATIVE PERCENT: 0.8 % (ref 0–1)
BILIRUB SERPL-MCNC: <0.2 MG/DL (ref 0.2–1.2)
BUN BLDV-MCNC: 11 MG/DL (ref 6–20)
CALCIUM SERPL-MCNC: 9.3 MG/DL (ref 8.6–10)
CHLORIDE BLD-SCNC: 104 MMOL/L (ref 98–111)
CHOLESTEROL, TOTAL: 233 MG/DL (ref 160–199)
CO2: 25 MMOL/L (ref 22–29)
CREAT SERPL-MCNC: 0.6 MG/DL (ref 0.5–0.9)
EOSINOPHILS ABSOLUTE: 0.3 K/UL (ref 0–0.6)
EOSINOPHILS RELATIVE PERCENT: 4.1 % (ref 0–5)
GFR AFRICAN AMERICAN: >59
GFR NON-AFRICAN AMERICAN: >60
GLUCOSE BLD-MCNC: 112 MG/DL (ref 74–109)
HBA1C MFR BLD: 6 % (ref 4–6)
HCT VFR BLD CALC: 40 % (ref 37–47)
HDLC SERPL-MCNC: 42 MG/DL (ref 65–121)
HEMOGLOBIN: 12.9 G/DL (ref 12–16)
IMMATURE GRANULOCYTES #: 0 K/UL
LDL CHOLESTEROL CALCULATED: 149 MG/DL
LYMPHOCYTES ABSOLUTE: 2.2 K/UL (ref 1.1–4.5)
LYMPHOCYTES RELATIVE PERCENT: 34.6 % (ref 20–40)
MCH RBC QN AUTO: 29 PG (ref 27–31)
MCHC RBC AUTO-ENTMCNC: 32.3 G/DL (ref 33–37)
MCV RBC AUTO: 89.9 FL (ref 81–99)
MONOCYTES ABSOLUTE: 0.5 K/UL (ref 0–0.9)
MONOCYTES RELATIVE PERCENT: 7.4 % (ref 0–10)
NEUTROPHILS ABSOLUTE: 3.3 K/UL (ref 1.5–7.5)
NEUTROPHILS RELATIVE PERCENT: 52.8 % (ref 50–65)
PDW BLD-RTO: 13.5 % (ref 11.5–14.5)
PLATELET # BLD: 336 K/UL (ref 130–400)
PMV BLD AUTO: 9.5 FL (ref 9.4–12.3)
POTASSIUM SERPL-SCNC: 4.3 MMOL/L (ref 3.5–5)
RBC # BLD: 4.45 M/UL (ref 4.2–5.4)
SODIUM BLD-SCNC: 141 MMOL/L (ref 136–145)
TOTAL PROTEIN: 7.3 G/DL (ref 6.6–8.7)
TRIGL SERPL-MCNC: 209 MG/DL (ref 0–149)
TSH SERPL DL<=0.05 MIU/L-ACNC: 2.43 UIU/ML (ref 0.27–4.2)
VITAMIN D 25-HYDROXY: 11.8 NG/ML
WBC # BLD: 6.3 K/UL (ref 4.8–10.8)

## 2022-01-17 PROCEDURE — 99213 OFFICE O/P EST LOW 20 MIN: CPT | Performed by: NURSE PRACTITIONER

## 2022-01-17 ASSESSMENT — PATIENT HEALTH QUESTIONNAIRE - PHQ9
SUM OF ALL RESPONSES TO PHQ QUESTIONS 1-9: 1
2. FEELING DOWN, DEPRESSED OR HOPELESS: 1
SUM OF ALL RESPONSES TO PHQ QUESTIONS 1-9: 1
1. LITTLE INTEREST OR PLEASURE IN DOING THINGS: 0
SUM OF ALL RESPONSES TO PHQ9 QUESTIONS 1 & 2: 1

## 2022-01-17 ASSESSMENT — ENCOUNTER SYMPTOMS
RHINORRHEA: 0
BACK PAIN: 0
NAUSEA: 0
PHOTOPHOBIA: 0
VOICE CHANGE: 0
COUGH: 0
SHORTNESS OF BREATH: 0
VOMITING: 0
COLOR CHANGE: 0

## 2022-01-17 NOTE — PROGRESS NOTES
[address] [facility]  [address] [id number] [address] [address]  Dept: [phone]  Dept Fax: [phone]  Loc: [phone]    Aziza Turcios is a 39 y.o. female who presents today for her medical conditions/complaints as noted below. Brigitte Turcios is c/o of Weight Loss    HPI:     HPI   Chief Complaint   Patient presents with    Weight Loss     Patient presents today to discuss weight loss. She reports she has tried multiple different diets and has success for a short while but has not been able to sustain weight loss. She has tried phentermine in the past; she states she did not feel well on this medication. Past Medical History:   Diagnosis Date    Anxiety     Chronic back pain     OCD (obsessive compulsive disorder)       Past Surgical History:   Procedure Laterality Date     SECTION      LUMBAR DISCECTOMY      LUMBAR LAMINECTOMY         Vitals 2022 2021 2021 2021 10/14/2021    SYSTOLIC [phone] - -   DIASTOLIC 78 80 82 86 - -   Site - - - - - -   Position - - - - - -   Cuff Size - - - - - -   Pulse 98 86 116 81 99 -   Temp 97.8 96.8 97.2 97.6 - -   Resp - - 22 - 18 -   SpO2 98 98 100 99 - -   Weight 241 lb 242 lb 238 lb 242 lb 12.8 oz - -   Height 5' 5\" 5' 5\" 5' 5\" 5' 5\" - -   Body mass index 40.1 kg/m2 40.27 kg/m2 39.6 kg/m2 40.4 kg/m2 - -   Pain Level - - - - - 8   Some recent data might be hidden       History reviewed. No pertinent family history. Social History     Tobacco Use    Smoking status: Current Every Day Smoker     Packs/day: 0.00     Types: Cigarettes    Smokeless tobacco: Current User   Substance Use Topics    Alcohol use:  Yes     Alcohol/week: 8.0 standard drinks     Types: 7 Cans of beer, 1 Shots of liquor per week      Current Outpatient Medications on File Prior to Visit   Medication Sig Dispense Refill    vitamin D (ERGOCALCIFEROL) 1.25 MG (65330 UT) CAPS capsule TAKE 1 CAPSULE BY MOUTH 2 TIMES A WEEK 8 capsule 3    gabapentin (NEURONTIN) 300 MG capsule Take 300 mg by mouth 3 times daily.  cyclobenzaprine (FLEXERIL) 10 MG tablet Take 10 mg by mouth 3 times daily as needed for Muscle spasms      traMADol (ULTRAM) 50 MG tablet Take 50 mg by mouth every 8 hours as needed for Pain. No current facility-administered medications on file prior to visit. No Known Allergies    Health Maintenance   Topic Date Due    Hepatitis C screen  Never done    COVID-19 Vaccine (1) Never done    Pneumococcal 0-64 years Vaccine (1 of 2 - PPSV23) Never done    HIV screen  Never done    Flu vaccine (1) Never done    DTaP/Tdap/Td vaccine (2 - Td or Tdap) 04/02/2022    Depression Screen  12/29/2022    Cervical cancer screen  03/26/2023    Hepatitis A vaccine  Aged Out    Hepatitis B vaccine  Aged Out    Hib vaccine  Aged Out    Meningococcal (ACWY) vaccine  Aged Out    Varicella vaccine  Discontinued       Subjective:      Review of Systems   Constitutional: Negative for chills and fever. HENT: Negative for ear pain, hearing loss, rhinorrhea and voice change. Eyes: Negative for photophobia and visual disturbance. Respiratory: Negative for cough and shortness of breath. Cardiovascular: Negative for chest pain and palpitations. Gastrointestinal: Negative for nausea and vomiting. Endocrine: Negative. Negative for cold intolerance and heat intolerance. Genitourinary: Negative for difficulty urinating and flank pain. Musculoskeletal: Negative for back pain and neck pain. Skin: Negative for color change and rash. Allergic/Immunologic: Negative for environmental allergies and food allergies. Neurological: Negative for dizziness, speech difficulty and headaches. Hematological: Does not bruise/bleed easily. Psychiatric/Behavioral: Negative for sleep disturbance and suicidal ideas. Objective:     Physical Exam  Vitals and nursing note reviewed. Constitutional:       Appearance: She is well-developed. HENT:      Head: Atraumatic. Right Ear: External ear normal.      Left Ear: External ear normal.      Nose: Nose normal.   Eyes:      Conjunctiva/sclera: Conjunctivae normal.      Pupils: Pupils are equal, round, and reactive to light. Cardiovascular:      Rate and Rhythm: Normal rate and regular rhythm. Heart sounds: Normal heart sounds, S1 normal and S2 normal.   Pulmonary:      Effort: Pulmonary effort is normal.      Breath sounds: Normal breath sounds. Abdominal:      General: Bowel sounds are normal.      Palpations: Abdomen is soft. Musculoskeletal:         General: Normal range of motion. Cervical back: Normal range of motion and neck supple. Skin:     General: Skin is warm and dry. Neurological:      Mental Status: She is alert and oriented to person, place, and time. Psychiatric:         Behavior: Behavior normal.       /78   Pulse 98   Temp 97.8 °F (36.6 °C) (Temporal)   Ht 5' 5\" (1.651 m)   Wt 241 lb (109.3 kg)   LMP 12/26/2021   SpO2 98%   BMI 40.10 kg/m²     Assessment:       Diagnosis Orders   1. Encounter for weight management         Plan:       Patient is going to work on behavior modifications and meal prepping. We discussed intermittent fasting and keeping a food diary. She will follow-up in 1 month. Will consider bariatrics referral.     PDMP Monitoring:    Last PDMP Guillermo as Reviewed [facility]):  Review User Review Instant Review Result             Urine Drug Screenings (1 yr)    No resulted procedures found. Medication Contract and Consent for Opioid Use Documents Filed      No documents found                 Patient given educational materials -see patient instructions. Discussed use, benefit, and side effects of prescribed medications. All patient questions answered. Pt voiced understanding. Reviewed health maintenance. Instructed to continue currentmedications, diet and exercise.   Patient agreed with treatment plan. Follow up as directed. MEDICATIONS:  No orders of the defined types were placed in this encounter. ORDERS:  No orders of the defined types were placed in this encounter. Follow-up:  Return in about 4 weeks (around 2/14/2022). PATIENT INSTRUCTIONS:  There are no Patient Instructions on file for this visit. Electronically signed by JOSELINE Arias on 1/17/2022 at 1:35 PM    EMR Dragon/transcription disclaimer:  Much of thisencounter note is electronic transcription/translation of spoken language to printed texts. The electronic translation of spoken language may be erroneous, or at times, nonsensical words or phrases may be inadvertentlytranscribed.   Although I have reviewed the note for such errors, some may still exist.

## 2022-01-24 RX ORDER — IBUPROFEN 800 MG/1
800 TABLET ORAL 2 TIMES DAILY PRN
Qty: 60 TABLET | Refills: 0 | Status: SHIPPED | OUTPATIENT
Start: 2022-01-24 | End: 2022-02-28

## 2022-02-08 ENCOUNTER — HOSPITAL ENCOUNTER (OUTPATIENT)
Dept: GENERAL RADIOLOGY | Age: 37
Discharge: HOME | End: 2022-02-08
Payer: MEDICAID

## 2022-02-08 ENCOUNTER — OFFICE VISIT (OUTPATIENT)
Dept: PRIMARY CARE CLINIC | Age: 37
End: 2022-02-08
Payer: MEDICAID

## 2022-02-08 VITALS
DIASTOLIC BLOOD PRESSURE: 74 MMHG | HEIGHT: 65 IN | BODY MASS INDEX: 39.75 KG/M2 | HEART RATE: 98 BPM | WEIGHT: 238.6 LBS | TEMPERATURE: 98 F | OXYGEN SATURATION: 97 % | SYSTOLIC BLOOD PRESSURE: 128 MMHG
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
## 2022-02-08 DIAGNOSIS — M25.562 ACUTE PAIN OF LEFT KNEE: Primary | ICD-10-CM

## 2022-02-08 DIAGNOSIS — M25.562 ACUTE PAIN OF LEFT KNEE: ICD-10-CM

## 2022-02-08 PROCEDURE — 73562 X-RAY EXAM OF KNEE 3: CPT

## 2022-02-08 PROCEDURE — 99213 OFFICE O/P EST LOW 20 MIN: CPT | Performed by: NURSE PRACTITIONER

## 2022-02-08 RX ORDER — DEXAMETHASONE SODIUM PHOSPHATE 10 MG/ML
10 INJECTION INTRAMUSCULAR; INTRAVENOUS ONCE
Status: COMPLETED | OUTPATIENT
Start: 2022-02-08 | End: 2022-02-08

## 2022-02-08 RX ORDER — KETOROLAC TROMETHAMINE 30 MG/ML
60 INJECTION, SOLUTION INTRAMUSCULAR; INTRAVENOUS ONCE
Status: COMPLETED | OUTPATIENT
Start: 2022-02-08 | End: 2022-02-08

## 2022-02-08 RX ADMIN — KETOROLAC TROMETHAMINE 60 MG: 30 INJECTION, SOLUTION INTRAMUSCULAR; INTRAVENOUS at 16:25

## 2022-02-08 RX ADMIN — DEXAMETHASONE SODIUM PHOSPHATE 10 MG: 10 INJECTION INTRAMUSCULAR; INTRAVENOUS at 16:25

## 2022-02-08 ASSESSMENT — PATIENT HEALTH QUESTIONNAIRE - PHQ9
SUM OF ALL RESPONSES TO PHQ QUESTIONS 1-9: 0
1. LITTLE INTEREST OR PLEASURE IN DOING THINGS: 0
2. FEELING DOWN, DEPRESSED OR HOPELESS: 0
SUM OF ALL RESPONSES TO PHQ QUESTIONS 1-9: 0
SUM OF ALL RESPONSES TO PHQ QUESTIONS 1-9: 0
SUM OF ALL RESPONSES TO PHQ9 QUESTIONS 1 & 2: 0
SUM OF ALL RESPONSES TO PHQ QUESTIONS 1-9: 0

## 2022-02-08 ASSESSMENT — ENCOUNTER SYMPTOMS
COLOR CHANGE: 0
PHOTOPHOBIA: 0
SHORTNESS OF BREATH: 0
COUGH: 0
VOMITING: 0
BACK PAIN: 0
VOICE CHANGE: 0
NAUSEA: 0
RHINORRHEA: 0

## 2022-02-08 NOTE — PROGRESS NOTES
After obtaining consent, and per orders of JOSELINE Starr, injection of Dexamethasone 10 mg and Toradol 60 mg given in right and Left hip by Sarah Taveras MA. Patient instructed to remain in clinic for 20 minutes afterwards, and to report any adverse reaction to me immediately.

## 2022-02-08 NOTE — PROGRESS NOTES
Outpatient Medications on File Prior to Visit   Medication Sig Dispense Refill    ibuprofen (ADVIL;MOTRIN) 800 MG tablet Take 1 tablet by mouth 2 times daily as needed for Pain 60 tablet 0    vitamin D (ERGOCALCIFEROL) 1.25 MG (16220 UT) CAPS capsule TAKE 1 CAPSULE BY MOUTH 2 TIMES A WEEK 8 capsule 3    gabapentin (NEURONTIN) 300 MG capsule Take 300 mg by mouth 3 times daily.  cyclobenzaprine (FLEXERIL) 10 MG tablet Take 10 mg by mouth 3 times daily as needed for Muscle spasms      traMADol (ULTRAM) 50 MG tablet Take 50 mg by mouth every 8 hours as needed for Pain. No current facility-administered medications on file prior to visit. No Known Allergies    Health Maintenance   Topic Date Due    Hepatitis C screen  Never done    COVID-19 Vaccine (1) Never done    Pneumococcal 0-64 years Vaccine (1 of 2 - PPSV23) Never done    HIV screen  Never done    Flu vaccine (1) Never done    DTaP/Tdap/Td vaccine (2 - Td or Tdap) 04/02/2022    A1C test (Diabetic or Prediabetic)  01/17/2023    Depression Screen  01/17/2023    Cervical cancer screen  03/26/2023    Hepatitis A vaccine  Aged Out    Hepatitis B vaccine  Aged Out    Hib vaccine  Aged Out    Meningococcal (ACWY) vaccine  Aged Out    Varicella vaccine  Discontinued       Subjective:      Review of Systems   Constitutional: Negative for chills and fever. HENT: Negative for ear pain, hearing loss, rhinorrhea and voice change. Eyes: Negative for photophobia and visual disturbance. Respiratory: Negative for cough and shortness of breath. Cardiovascular: Negative for chest pain and palpitations. Gastrointestinal: Negative for nausea and vomiting. Endocrine: Negative. Negative for cold intolerance and heat intolerance. Genitourinary: Negative for difficulty urinating and flank pain. Musculoskeletal: Negative for back pain and neck pain. Skin: Negative for color change and rash.    Allergic/Immunologic: Negative for environmental allergies and food allergies. Neurological: Negative for dizziness, speech difficulty and headaches. Hematological: Does not bruise/bleed easily. Psychiatric/Behavioral: Negative for sleep disturbance and suicidal ideas. Objective:     Physical Exam  Vitals and nursing note reviewed. Constitutional:       Appearance: She is well-developed. HENT:      Head: Atraumatic. Right Ear: External ear normal.      Left Ear: External ear normal.      Nose: Nose normal.   Eyes:      Conjunctiva/sclera: Conjunctivae normal.      Pupils: Pupils are equal, round, and reactive to light. Cardiovascular:      Rate and Rhythm: Normal rate and regular rhythm. Heart sounds: Normal heart sounds, S1 normal and S2 normal.   Pulmonary:      Effort: Pulmonary effort is normal.      Breath sounds: Normal breath sounds. Abdominal:      General: Bowel sounds are normal.      Palpations: Abdomen is soft. Musculoskeletal:      Cervical back: Normal range of motion and neck supple. Right knee: Bony tenderness present. Decreased range of motion. Tenderness present. Skin:     General: Skin is warm and dry. Neurological:      Mental Status: She is alert and oriented to person, place, and time. Psychiatric:         Behavior: Behavior normal.       /74   Pulse 98   Temp 98 °F (36.7 °C) (Temporal)   Ht 5' 5\" (1.651 m)   Wt 238 lb 9.6 oz (108.2 kg)   SpO2 97%   BMI 39.71 kg/m²     Assessment:       Diagnosis Orders   1. Acute pain of left knee  XR KNEE RIGHT (3 VIEWS)   2. Fall, initial encounter  XR KNEE RIGHT (3 VIEWS)         Plan:     Toradol 60 mg IM. Dexamethasone 10 mg IM. Knee xray today. Ice/elevate knee. Stay off of for 48 hours. PDMP Monitoring:    Last PDMP Guillermo as Reviewed [facility]):  Review User Review Instant Review Result            Urine Drug Screenings (1 yr)    No resulted procedures found.        Medication Contract and Consent for Opioid Use Documents Filed No documents found                 Patient given educational materials -see patient instructions. Discussed use, benefit, and side effects of prescribed medications. All patient questions answered. Pt voiced understanding. Reviewed health maintenance. Instructed to continue currentmedications, diet and exercise. Patient agreed with treatment plan. Follow up as directed. MEDICATIONS:  Orders Placed This Encounter   Medications    ketorolac (TORADOL) injection 60 mg    dexamethasone (DECADRON) injection 10 mg         ORDERS:  Orders Placed This Encounter   Procedures    XR KNEE RIGHT (3 VIEWS)       Follow-up:  Return if symptoms worsen or fail to improve. PATIENT INSTRUCTIONS:  There are no Patient Instructions on file for this visit. Electronically signed by JOSELINE Gillette on 2/8/2022 at 2:51 PM    EMR Dragon/transcription disclaimer:  Much of thisencounter note is electronic transcription/translation of spoken language to printed texts. The electronic translation of spoken language may be erroneous, or at times, nonsensical words or phrases may be inadvertentlytranscribed.   Although I have reviewed the note for such errors, some may still exist.

## 2022-02-11 ENCOUNTER — TELEPHONE (OUTPATIENT)
Dept: PRIMARY CARE CLINIC | Age: 37
End: 2022-02-11

## 2022-02-11 NOTE — TELEPHONE ENCOUNTER
----- Message from JOSELINE Jolly sent at 2/8/2022  4:04 PM CST -----  Please inform patient of normal knee xray.

## 2022-02-21 ENCOUNTER — OFFICE VISIT (OUTPATIENT)
Dept: PRIMARY CARE CLINIC | Age: 37
End: 2022-02-21
Payer: MEDICAID

## 2022-02-21 VITALS
HEART RATE: 91 BPM | HEIGHT: 64 IN | OXYGEN SATURATION: 98 % | WEIGHT: 238.4 LBS | TEMPERATURE: 98.8 F | SYSTOLIC BLOOD PRESSURE: 120 MMHG | DIASTOLIC BLOOD PRESSURE: 86 MMHG | BODY MASS INDEX: 40.7 KG/M2

## 2022-02-21 DIAGNOSIS — E66.01 CLASS 3 SEVERE OBESITY DUE TO EXCESS CALORIES WITHOUT SERIOUS COMORBIDITY WITH BODY MASS INDEX (BMI) OF 40.0 TO 44.9 IN ADULT (HCC): ICD-10-CM

## 2022-02-21 DIAGNOSIS — Z76.89 ENCOUNTER FOR WEIGHT MANAGEMENT: Primary | ICD-10-CM

## 2022-02-21 PROCEDURE — 99213 OFFICE O/P EST LOW 20 MIN: CPT | Performed by: NURSE PRACTITIONER

## 2022-02-21 RX ORDER — IBUPROFEN 200 MG
CAPSULE ORAL
COMMUNITY

## 2022-02-21 SDOH — ECONOMIC STABILITY: FOOD INSECURITY: WITHIN THE PAST 12 MONTHS, THE FOOD YOU BOUGHT JUST DIDN'T LAST AND YOU DIDN'T HAVE MONEY TO GET MORE.: SOMETIMES TRUE

## 2022-02-21 SDOH — ECONOMIC STABILITY: FOOD INSECURITY: WITHIN THE PAST 12 MONTHS, YOU WORRIED THAT YOUR FOOD WOULD RUN OUT BEFORE YOU GOT MONEY TO BUY MORE.: SOMETIMES TRUE

## 2022-02-21 ASSESSMENT — ENCOUNTER SYMPTOMS
COUGH: 0
VOICE CHANGE: 0
VOMITING: 0
BACK PAIN: 0
COLOR CHANGE: 0
NAUSEA: 0
RHINORRHEA: 0
PHOTOPHOBIA: 0
SHORTNESS OF BREATH: 0

## 2022-02-21 ASSESSMENT — SOCIAL DETERMINANTS OF HEALTH (SDOH): HOW HARD IS IT FOR YOU TO PAY FOR THE VERY BASICS LIKE FOOD, HOUSING, MEDICAL CARE, AND HEATING?: HARD

## 2022-02-21 NOTE — PROGRESS NOTES
[facility]  [address] [address] [address] [address]  Dept: [phone]  Dept Fax: [phone]  Loc: [phone]    [facility] Karolina is a 39 y.o. female who presents today for her medical conditions/complaints as noted below. Keyla Turcios is c/o of Weight Loss (water intake is going well , discuss gastric )        HPI:     HPI   Chief Complaint   Patient presents with    Weight Loss     water intake is going well , discuss gastric      Patient presents today for follow-up weight management. She is interested in consultation for gastric sleeve. She has tried multiple different diets without success in losing weight; she did not feel well when trying to take phentermine. She has had 3lb weight loss since last visit. Past Medical History:   Diagnosis Date    Anxiety     Chronic back pain     OCD (obsessive compulsive disorder)       Past Surgical History:   Procedure Laterality Date     SECTION      LUMBAR DISCECTOMY      LUMBAR LAMINECTOMY         Vitals 2021    SYSTOLIC [phone]   DIASTOLIC 86 74 78 80 82 86   Site Left Upper Arm - - - - -   Position Sitting - - - - -   Cuff Size - - - - - -   Pulse 91 98 98 86 116 81   Temp 98.8 98 97.8 96.8 97.2 97.6   Resp - - - - 22 -   SpO2 98 97 98 98 100 99   Weight 238 lb 6.4 oz 238 lb 9.6 oz 241 lb 242 lb 238 lb 242 lb 12.8 oz   Height 5' 4\" 5' 5\" 5' 5\" 5' 5\" 5' 5\" 5' 5\"   Body mass index 40.92 kg/m2 39.7 kg/m2 40.1 kg/m2 40.27 kg/m2 39.6 kg/m2 40.4 kg/m2   Pain Level - - - - - -   Some recent data might be hidden       History reviewed. No pertinent family history. Social History     Tobacco Use    Smoking status: Current Every Day Smoker     Packs/day: 0.00     Types: Cigarettes    Smokeless tobacco: Current User   Substance Use Topics    Alcohol use:  Yes     Alcohol/week: 8.0 standard drinks     Types: 7 Cans of beer, 1 Shots of liquor per week      Current Outpatient Medications on File Prior to Visit   Medication Sig Dispense Refill    calcium citrate (CALCITRATE) 950 (200 Ca) MG tablet Take by mouth      ibuprofen (ADVIL;MOTRIN) 800 MG tablet Take 1 tablet by mouth 2 times daily as needed for Pain 60 tablet 0    vitamin D (ERGOCALCIFEROL) 1.25 MG (01041 UT) CAPS capsule TAKE 1 CAPSULE BY MOUTH 2 TIMES A WEEK 8 capsule 3    gabapentin (NEURONTIN) 300 MG capsule Take 300 mg by mouth 3 times daily.  cyclobenzaprine (FLEXERIL) 10 MG tablet Take 10 mg by mouth 3 times daily as needed for Muscle spasms      traMADol (ULTRAM) 50 MG tablet Take 50 mg by mouth every 8 hours as needed for Pain. No current facility-administered medications on file prior to visit. No Known Allergies    Health Maintenance   Topic Date Due    Hepatitis C screen  Never done    COVID-19 Vaccine (1) Never done    Pneumococcal 0-64 years Vaccine (1 of 2 - PPSV23) Never done    HIV screen  Never done    Flu vaccine (1) Never done    DTaP/Tdap/Td vaccine (2 - Td or Tdap) 04/02/2022    A1C test (Diabetic or Prediabetic)  01/17/2023    Depression Screen  02/08/2023    Cervical cancer screen  03/26/2023    Hepatitis A vaccine  Aged Out    Hepatitis B vaccine  Aged Out    Hib vaccine  Aged Out    Meningococcal (ACWY) vaccine  Aged Out    Varicella vaccine  Discontinued       Subjective:      Review of Systems   Constitutional: Negative for chills and fever. HENT: Negative for ear pain, hearing loss, rhinorrhea and voice change. Eyes: Negative for photophobia and visual disturbance. Respiratory: Negative for cough and shortness of breath. Cardiovascular: Negative for chest pain and palpitations. Gastrointestinal: Negative for nausea and vomiting. Endocrine: Negative. Negative for cold intolerance and heat intolerance. Genitourinary: Negative for difficulty urinating and flank pain.    Musculoskeletal: Negative for back pain and neck pain. Skin: Negative for color change and rash. Allergic/Immunologic: Negative for environmental allergies and food allergies. Neurological: Negative for dizziness, speech difficulty and headaches. Hematological: Does not bruise/bleed easily. Psychiatric/Behavioral: Negative for sleep disturbance and suicidal ideas. Objective:     Physical Exam  Vitals reviewed. Constitutional:       Appearance: She is well-developed. She is obese. HENT:      Head: Atraumatic. Right Ear: External ear normal.      Left Ear: External ear normal.      Nose: Nose normal.   Eyes:      Conjunctiva/sclera: Conjunctivae normal.      Pupils: Pupils are equal, round, and reactive to light. Cardiovascular:      Rate and Rhythm: Normal rate and regular rhythm. Heart sounds: Normal heart sounds, S1 normal and S2 normal.   Pulmonary:      Effort: Pulmonary effort is normal.      Breath sounds: Normal breath sounds. Abdominal:      General: Bowel sounds are normal.      Palpations: Abdomen is soft. Musculoskeletal:         General: Normal range of motion. Cervical back: Normal range of motion and neck supple. Skin:     General: Skin is warm and dry. Neurological:      Mental Status: She is alert and oriented to person, place, and time. Psychiatric:         Behavior: Behavior normal.       /86 (Site: Left Upper Arm, Position: Sitting)   Pulse 91   Temp 98.8 °F (37.1 °C) (Temporal)   Ht 5' 4\" (1.626 m)   Wt 238 lb 6.4 oz (108.1 kg)   SpO2 98%   BMI 40.92 kg/m²     Assessment:       Diagnosis Orders   1. Encounter for weight management  External Referral To Bariatrics   2. Class 3 severe obesity due to excess calories without serious comorbidity with body mass index (BMI) of 40.0 to 44.9 in adult [facility])  External Referral To Bariatrics         Plan:     Referral to Dr Katherine Fothergill, bariatric surgery. Follow-up in 6 months or sooner if needed.     PDMP Monitoring:    Last PDMP Guillermo as Reviewed [facility]):  Review User Review Instant Review Result            Urine Drug Screenings (1 yr)    No resulted procedures found. Medication Contract and Consent for Opioid Use Documents Filed      No documents found                 Patient given educational materials -see patient instructions. Discussed use, benefit, and side effects of prescribed medications. All patient questions answered. Pt voiced understanding. Reviewed health maintenance. Instructed to continue currentmedications, diet and exercise. Patient agreed with treatment plan. Follow up as directed. MEDICATIONS:  No orders of the defined types were placed in this encounter. ORDERS:  Orders Placed This Encounter   Procedures    External Referral To Bariatrics       Follow-up:  No follow-ups on file. PATIENT INSTRUCTIONS:  There are no Patient Instructions on file for this visit. Electronically signed by JOSELINE Gillette on 2/21/2022 at 1:39 PM    EMR Dragon/transcription disclaimer:  Much of thisencounter note is electronic transcription/translation of spoken language to printed texts. The electronic translation of spoken language may be erroneous, or at times, nonsensical words or phrases may be inadvertentlytranscribed.   Although I have reviewed the note for such errors, some may still exist.

## 2022-02-28 RX ORDER — IBUPROFEN 800 MG/1
TABLET ORAL
Qty: 60 TABLET | Refills: 0 | Status: SHIPPED | OUTPATIENT
Start: 2022-02-28

## 2022-03-28 ENCOUNTER — OFFICE VISIT (OUTPATIENT)
Dept: BARIATRICS/WEIGHT MGMT | Facility: CLINIC | Age: 37
End: 2022-03-28

## 2022-03-28 VITALS
WEIGHT: 236 LBS | SYSTOLIC BLOOD PRESSURE: 126 MMHG | HEART RATE: 82 BPM | BODY MASS INDEX: 39.32 KG/M2 | OXYGEN SATURATION: 97 % | HEIGHT: 65 IN | TEMPERATURE: 98.6 F | DIASTOLIC BLOOD PRESSURE: 84 MMHG

## 2022-03-28 DIAGNOSIS — Z72.0 NICOTINE ABUSE: ICD-10-CM

## 2022-03-28 DIAGNOSIS — E78.2 MIXED HYPERLIPIDEMIA: ICD-10-CM

## 2022-03-28 DIAGNOSIS — R53.82 CHRONIC FATIGUE: ICD-10-CM

## 2022-03-28 DIAGNOSIS — R06.83 SNORING: ICD-10-CM

## 2022-03-28 DIAGNOSIS — M53.9 MULTILEVEL DEGENERATIVE DISC DISEASE: ICD-10-CM

## 2022-03-28 DIAGNOSIS — E66.01 CLASS 2 SEVERE OBESITY DUE TO EXCESS CALORIES WITH SERIOUS COMORBIDITY AND BODY MASS INDEX (BMI) OF 39.0 TO 39.9 IN ADULT: Primary | ICD-10-CM

## 2022-03-28 PROCEDURE — 99204 OFFICE O/P NEW MOD 45 MIN: CPT | Performed by: NURSE PRACTITIONER

## 2022-03-28 RX ORDER — CYCLOBENZAPRINE HCL 10 MG
10 TABLET ORAL 3 TIMES DAILY PRN
COMMUNITY

## 2022-03-28 RX ORDER — MULTIPLE VITAMINS W/ MINERALS TAB 9MG-400MCG
1 TAB ORAL DAILY
COMMUNITY

## 2022-03-28 RX ORDER — GABAPENTIN 300 MG/1
300 CAPSULE ORAL EVERY 8 HOURS PRN
COMMUNITY

## 2022-03-28 RX ORDER — ERGOCALCIFEROL 1.25 MG/1
50000 CAPSULE ORAL WEEKLY
COMMUNITY

## 2022-03-28 RX ORDER — IBUPROFEN 800 MG/1
800 TABLET ORAL EVERY 6 HOURS PRN
COMMUNITY

## 2022-03-28 NOTE — PROGRESS NOTES
Patient Care Team:  Shaunna Choudhury APRN as PCP - General (Otolaryngology)      Subjective     Patient is a 36 y.o. female presents with morbid obesity and her Body mass index is 39.27 kg/m².      She is here for discussion of surgical weight loss options.  She stated she has been with the disease of obesity for year(s).  She stated she suffers from hyperlipidemia, degenerative disc disease, joint pain and fatigue and morbid obesity due to her weight gain.  She stated that weight loss helps alleviate these symptoms.   She stated that she has tried multiple other diet regimens to help with weight loss.  She stated that she has attempted these conservative methods for weight loss without maintaining long term success.  Today she would like to discuss surgical weight loss options such as the Laparoscopic Sleeve Gastrectomy or the Laparoscopic R - Y Gastric Bypass.   Patient states that she has had multiple back surgeries and struggles with neuropathy from injury that have been greater than 5 years ago.  She states that she has tried multiple diets to lose weight and is struggling with weight loss.    Review of Systems   Constitutional: Positive for fatigue.   Respiratory: Negative.    Cardiovascular: Negative.    Gastrointestinal: Negative.    Endocrine: Negative.    Genitourinary: Positive for menstrual problem.   Musculoskeletal: Positive for arthralgias, back pain and myalgias.   Neurological: Positive for numbness.   Psychiatric/Behavioral: Positive for sleep disturbance. The patient is nervous/anxious.         History  Past Medical History:   Diagnosis Date   • Allergic    • Anxiety    • Arthritis    • Chronic pain disorder     TWO BACK SURGERY'S   • H/O streptococcal infection    • Urinary tract infection       Past Surgical History:   Procedure Laterality Date   •  SECTION  2003   • SPINE SURGERY      Lumbar Laminectomy (12), Lumbar Disc Replacement (2016)      Social History      Socioeconomic History   • Marital status:    Tobacco Use   • Smoking status: Current Some Day Smoker     Packs/day: 0.25     Years: 11.00     Pack years: 2.75     Types: Cigarettes   • Smokeless tobacco: Never Used   • Tobacco comment: vapes   Substance and Sexual Activity   • Alcohol use: Yes     Alcohol/week: 2.0 standard drinks     Types: 2 Cans of beer per week     Comment: 1-2 DAILY   • Drug use: No   • Sexual activity: Yes     Partners: Male     Birth control/protection: Condom      Family History   Problem Relation Age of Onset   • Obesity Mother    • Cancer Father    • Obesity Paternal Grandmother    • Stroke Paternal Grandmother       No Known Allergies       Current Outpatient Medications:   •  ACETAMINOPHEN PO, Take  by mouth As Needed (last taken at 10 p.m. on last night)., Disp: , Rfl:   •  albuterol (PROVENTIL HFA;VENTOLIN HFA) 108 (90 Base) MCG/ACT inhaler, Inhale 2 puffs Every 4 (Four) Hours As Needed for Wheezing or Shortness of Air., Disp: 1 inhaler, Rfl: 0  •  calcium citrate-vitamin d (CITRACAL) 200-250 MG-UNIT tablet tablet, Take  by mouth Daily., Disp: , Rfl:   •  cyclobenzaprine (FLEXERIL) 10 MG tablet, Take 10 mg by mouth 3 (Three) Times a Day As Needed for Muscle Spasms., Disp: , Rfl:   •  gabapentin (NEURONTIN) 300 MG capsule, Take 300 mg by mouth Every 8 (Eight) Hours As Needed., Disp: , Rfl:   •  ibuprofen (ADVIL,MOTRIN) 800 MG tablet, Take 800 mg by mouth Every 6 (Six) Hours As Needed for Mild Pain ., Disp: , Rfl:   •  multivitamin with minerals (MULTIVITAMIN ADULT PO), Take 1 tablet by mouth Daily., Disp: , Rfl:   •  TRAMADOL HCL PO, Take  by mouth., Disp: , Rfl:   •  vitamin D (ERGOCALCIFEROL) 1.25 MG (22524 UT) capsule capsule, Take 50,000 Units by mouth 1 (One) Time Per Week., Disp: , Rfl:     Objective     Vital Signs  Temp:  [98.6 °F (37 °C)] 98.6 °F (37 °C)  Heart Rate:  [82] 82  BP: (126)/(84) 126/84  Body mass index is 39.27 kg/m².      03/28/22  0824   Weight: 107 kg  (236 lb)       Physical Exam  Vitals reviewed.   Constitutional:       Appearance: She is obese.   Cardiovascular:      Rate and Rhythm: Normal rate and regular rhythm.   Pulmonary:      Effort: Pulmonary effort is normal.   Abdominal:      General: Bowel sounds are normal.      Palpations: Abdomen is soft.   Skin:     General: Skin is warm and dry.   Neurological:      Mental Status: She is alert and oriented to person, place, and time.   Psychiatric:         Mood and Affect: Mood normal.         Behavior: Behavior normal.            Results Review:   I reviewed the patient's new clinical results.      Patient's Body mass index is 39.27 kg/m². indicating that she is morbidly obese (BMI > 40 or > 35 with obesity - related health condition). Obesity-related health conditions include the following: dyslipidemias. Obesity is worsening. BMI is is above average; BMI management plan is completed. We discussed portion control and increasing exercise..      Assessment/Plan   Diagnoses and all orders for this visit:    1. Class 2 severe obesity due to excess calories with serious comorbidity and body mass index (BMI) of 39.0 to 39.9 in adult (HCC) (Primary)  -     Ambulatory Referral to Neurology  -     Bariatric Nutritional Counseling    2. Mixed hyperlipidemia  -     Bariatric Nutritional Counseling    3. Multilevel degenerative disc disease    4. Snoring  -     Ambulatory Referral to Neurology    5. Nicotine abuse    6. Chronic fatigue          I discussed the patient's findings and my recommendations with patient.     I have also recommended that she obtain a cardiac risk assessment and psychiatric evaluation prior to surgery consideration.She has been provided a structured dietary regimen based off of her behavior.  I discussed with the patient the etiology of the disease of obesity and the potential comorbid conditions associated with this disease.  She was instructed to follow the dietary regimen and follow-up with  our program in 1 month's time with any additional questions as they may arise during this time.  We emphasized on focusing on proteins and meals high in fiber as well as adequate hydration that exceed 64 ounces of water daily.    I explained that I anticipate the patient to lose weight prior to her next monthly visit.      Patient will proceed with work-up for obstructive sleep apnea.  Patient states that she snores and struggles with daytime fatigue.  Patient will need to hold eliminate nicotine use for third appointment.    1. Patient is a 36 y.o. female who has morbid obesity with Body mass index is 39.27 kg/m². and desires surgical weight loss. Patient has been advised that this surgery is considered to be elective major surgery that is typically done laparoscopically. Patient is a potentially good surgical candidate. Patient will need to meet with the Bariatric Surgeon to further discuss surgical options. Patient has been advised that they will need to have a work-up prior to surgery. This work-up will include but is not limited to an EKG, an EGD to assess for H. Pylori and Ocampo's esophagus, and a psychological evaluation, with additional testing as necessary. Pre-op testing will be ordered at next visit. Today the patient  received the 4 meals/day diet prescription, which was explained to patient.  Patient will see the dietitian today to further discuss goals for diet, exercise, and lifestyle. Patient has received intensive behavioral therapy for obesity today. I explained the pathophysiology of the disease and its storage component. We also discussed Dr. Ryan' pearls of the program. Nutrition counseling ordered today.     2. Current comorbid condition of hyperlipidemia, DDD, associated with her morbid obesity is reported to be stable on her current treatment regimen and medications. We anticipate the comorbid condition to improve as we address her morbid obesity.          Pre-op testing:     Seminar -  Completed  EGD - Needed, but not yet ordered  H. Pylori - Will be done with EGD   H. Pylori Stool -  N/A    Psychological Evaluation - Needed, but not yet ordered    EKG - Needed, but not yet ordered    Cardiology - If EKG abnormal, cardiology will be ordered     TSH - Needed, but not yet ordered  Nicotine - NEEDS  Pulmonary Clearance - N/A   Drug Tests - N/A    Dietitian - Completed     Beth Rivero, JUANA     03/28/22  10:37 CDT

## 2022-03-29 ENCOUNTER — PATIENT ROUNDING (BHMG ONLY) (OUTPATIENT)
Dept: BARIATRICS/WEIGHT MGMT | Facility: CLINIC | Age: 37
End: 2022-03-29

## 2022-03-29 NOTE — PROGRESS NOTES
March 29, 2022    Hello, may I speak with Lottie Plunkett?    My name is Krystina       I am  with [facility]  [address], [address]  [facility] [address] [phone].    Before we get started may I verify your date of birth? 1985    I am calling to officially welcome you to our practice and ask about your recent visit. Is this a good time to talk? Yes it is .    Tell me about your visit with us. What things went well?  It was a good appointment . I do feel very overwhelmed . I did reach out to Beth ARIAS. She was very nice and told me the answers to what I had questions for.        We're always looking for ways to make our patients' experiences even better. Do you have recommendations on ways we may improve?  No ma'am.    Overall were you satisfied with your first visit to our practice?  Yes ma'am.        I appreciate you taking the time to speak with me today. Is there anything else I can do for you? No it isn't but I really do appreciate you calling me .      Thank you, and have a great day.

## 2022-04-25 ENCOUNTER — OFFICE VISIT (OUTPATIENT)
Dept: BARIATRICS/WEIGHT MGMT | Facility: CLINIC | Age: 37
End: 2022-04-25

## 2022-04-25 VITALS
HEIGHT: 65 IN | DIASTOLIC BLOOD PRESSURE: 79 MMHG | SYSTOLIC BLOOD PRESSURE: 134 MMHG | HEART RATE: 73 BPM | WEIGHT: 227.6 LBS | OXYGEN SATURATION: 97 % | BODY MASS INDEX: 37.92 KG/M2 | TEMPERATURE: 97.8 F

## 2022-04-25 DIAGNOSIS — R06.83 SNORING: ICD-10-CM

## 2022-04-25 DIAGNOSIS — Z72.0 NICOTINE ABUSE: ICD-10-CM

## 2022-04-25 DIAGNOSIS — R53.82 CHRONIC FATIGUE: ICD-10-CM

## 2022-04-25 DIAGNOSIS — E66.01 CLASS 2 SEVERE OBESITY DUE TO EXCESS CALORIES WITH SERIOUS COMORBIDITY AND BODY MASS INDEX (BMI) OF 39.0 TO 39.9 IN ADULT: Primary | ICD-10-CM

## 2022-04-25 DIAGNOSIS — E78.2 MIXED HYPERLIPIDEMIA: ICD-10-CM

## 2022-04-25 DIAGNOSIS — M53.9 MULTILEVEL DEGENERATIVE DISC DISEASE: ICD-10-CM

## 2022-04-25 PROCEDURE — 99213 OFFICE O/P EST LOW 20 MIN: CPT | Performed by: NURSE PRACTITIONER

## 2022-04-25 NOTE — PROGRESS NOTES
"Metabolic and Bariatric Surgery Adult Nutrition Assessment    Patient Name: Lottie Plunkett   YOB: 1985   MRN: [id number]     Assessment Date:  2022     Reason for Visit: Initial Nutrition Assessment     Treatment Pathway: Preoperative Bariatric Surgery, Visit 2    Assessment    Anthropometrics   Wt Readings from Last 1 Encounters:   22 103 kg (227 lb 9.6 oz)     Ht Readings from Last 1 Encounters:   22 165.1 cm (65\")     BMI Readings from Last 1 Encounters:   22 37.87 kg/m²        Initial Weight/Date: 236 lbs (3/28/22)  Weight Changes this month: -8.4lbs  Net Weight Change: -8.4 lbs    Past Medical History:   Diagnosis Date   • Allergic    • Anxiety    • Arthritis    • Chronic pain disorder     TWO BACK SURGERY'S   • H/O streptococcal infection    • Urinary tract infection       Past Surgical History:   Procedure Laterality Date   •  SECTION  2003   • SPINE SURGERY      Lumbar Laminectomy (13), Lumbar Disc Replacement (2016)      Current Outpatient Medications   Medication Sig Dispense Refill   • ACETAMINOPHEN PO Take  by mouth As Needed (last taken at 10 p.m. on last night).     • albuterol (PROVENTIL HFA;VENTOLIN HFA) 108 (90 Base) MCG/ACT inhaler Inhale 2 puffs Every 4 (Four) Hours As Needed for Wheezing or Shortness of Air. 1 inhaler 0   • calcium citrate-vitamin d (CITRACAL) 200-250 MG-UNIT tablet tablet Take  by mouth Daily.     • cyclobenzaprine (FLEXERIL) 10 MG tablet Take 10 mg by mouth 3 (Three) Times a Day As Needed for Muscle Spasms.     • gabapentin (NEURONTIN) 300 MG capsule Take 300 mg by mouth Every 8 (Eight) Hours As Needed.     • ibuprofen (ADVIL,MOTRIN) 800 MG tablet Take 800 mg by mouth Every 6 (Six) Hours As Needed for Mild Pain .     • multivitamin with minerals tablet tablet Take 1 tablet by mouth Daily.     • TRAMADOL HCL PO Take  by mouth.     • vitamin D (ERGOCALCIFEROL) 1.25 MG (64933 UT) capsule capsule Take 50,000 Units by " "mouth 1 (One) Time Per Week.       No current facility-administered medications for this visit.      Allergies:   Shar- Anaphylaxis and Mushrooms-stomach irritation N/V.       Pertinent Social/Behavior/Environmental History: Struggles with OCD and anxiety. Loves to cook. Didn't realize how heavy her cooking usually relied on cream cheese/heavy cream/dairy. Currently works as a . Starts work at 10am, slammed until 4pm. Leaves at 6pm.     Nutrition Recall  Eating 2-4 meals daily. Meal prep day tries to build everything as meal 3 and then adding as needed. Lots of salad and veggies mixes. Meatballs. Likes minimal meat traditionally.   (M1) 9:30am dry roasted veggies with eggs and egg whites in a fritta, OR breakfast burrito.   (M2) Salad at work with lots of veggies with shredded grilled chicken. \"Mexican Salad\"   (M3) not getting in most days.  (M4) 6:30 pm homemade veggies with meatballs.   Snacking - heavily reliant on SF jello cups. Eating lots of carrots broccoli.   Monitoring portions- measuring meat/protein sources. Copious amounts of vegetables so isn't measuring (has helped reduce her anxiety with the meal plan).    Calculating Protein- ~96 grams.  Drinking carbonated beverages- no  Fluid Intake- 32 oz jug drinks before leaving home, 16 oz coffee, 1-2 shots espresso, water throughout shift, additional 32 oz jug. Drinking some water with meals. Another 20-30 oz before bed.       Success this Month: Very proud of managing expectations. Homemade meatballs.   Barriers: still struggling with getting 4 meals/d.     Exercise: minimal. Hx multiple back surgeries. 31956+ steps at work. Some yoga and stretching.       Nutrition Intervention  Nutrition education and nutrition coaching for behavior change provided.  Strategies used included Comprehensive education, Motivational Interviewing , Problem Solving, Skill Development for meal planning, evaluating food menus for compliant meals while traveling, " encouraged cooking skills and creativity, Ongoing reinforcement and Provided samples of protein supplement   Review of medical weight loss prescription 4 meal/day plan and reviewed nutritional needs for Preoperative Bariatric Surgery, Visit 2.    Diet Changes  Eat 4 meals per day with protein and vegetables at each meal, no carbs after meal 2., Protein goal: 65 gms., Eat vegetables first at each meal., Discussed protein guidelines for shakes and bars., Reduce snacking -use foods from free foods list only., Choose more nutrient dense foods. and Monitor portion sizes using a food scale and/or measuring cup.      Goals- this month Lottie recognizes she has lots of increased travel and her biggest goal is to not gain weight and has some stress about eating while traveling  1. This week has a working interview day where she's unsure what the day itinerary will look like, is traveling and will be unable to cook/choose meals.  She's not had a reward day in >3 wks.  Goal this month is to have this reward day Wednesday April 27 th- reduce anxiety and give self reward day from eating.   2. Will begin tracking/logging intake again (did very well first two weeks of the month) to hold herself accountable to food intake, even while traveling.   3. Will use smoothies and/or protein shakes with vegetables to aid in getting meals on the go and replacement meals when traveling as necessary to remain compliant on the meal plan.     Self-monitoring strategies such as keeping a food journal (on paper or electronically) and calculating fluid/protein intake were discussed.    Monitoring/Evaluation Plan  Anticipate follow up per program protocol. Continue collaboration of care with physician and treatment team.     Electronically signed by  Mckenzie Walsh RDN, LD  04/25/2022 14:37 CDT.

## 2022-04-25 NOTE — PROGRESS NOTES
"Patient Care Team:  Shaunna Choudhury APRN as PCP - General (Otolaryngology)    Reason for Visit:  Surgical Weight loss, visit 2    Subjective   Lottie Plunkett is a 36 y.o. female.     Lottie is here for follow-up and continued medical management of her morbid obesity.  She is currently on a prescription diet.  Lottie previously was to apply dietary changes such as following the meal plan as directed.  She admits to eating 4 meals per day.  As a result she lost weight since her last visit.  She admits to vaping and states that she has decreased her nicotine intake some but is still vaping each day for anxiety and coping.  She admits to consuming alcohol about 1-2 times per week.  She states that she is getting around 90 ounces of fluid in each day and eating around 90 g of protein.  She denies daily exercise and states that she exercises less than 1 time per week.  She denies soda intake.  Overall patient has had a lot of personal stressors going on and states that she is still been working to follow the prescription meal plan.  She has lost 9 pounds since her last appointment.    Review Of Systems:  Review of Systems   Constitutional: Negative.    Respiratory: Negative.    Cardiovascular: Negative.    Gastrointestinal: Negative.    Endocrine: Negative.    Musculoskeletal: Positive for arthralgias, back pain and myalgias.   Neurological: Positive for numbness.   Psychiatric/Behavioral: Positive for sleep disturbance. The patient is nervous/anxious.          The following portions of the patient's history were reviewed and updated as appropriate: allergies, current medications, past family history, past medical history, past social history, past surgical history, and problem list.    Objective   /79 (BP Location: Right arm, Patient Position: Sitting, Cuff Size: Adult)   Pulse 73   Temp 97.8 °F (36.6 °C)   Ht 165.1 cm (65\")   Wt 103 kg (227 lb 9.6 oz)   SpO2 97%   BMI 37.87 kg/m²       04/25/22  1345 "   Weight: 103 kg (227 lb 9.6 oz)       Physical Exam  Vitals reviewed.   Constitutional:       Appearance: She is obese.   Cardiovascular:      Rate and Rhythm: Normal rate and regular rhythm.   Pulmonary:      Effort: Pulmonary effort is normal.   Skin:     General: Skin is warm and dry.   Neurological:      Mental Status: She is alert and oriented to person, place, and time.   Psychiatric:         Mood and Affect: Mood normal.         Behavior: Behavior normal.         Patient's Body mass index is 37.87 kg/m². indicating that she is morbidly obese (BMI > 40 or > 35 with obesity - related health condition). Obesity-related health conditions include the following: dyslipidemias and Degenerative disc disease. Obesity is improving with treatment. BMI is is above average; BMI management plan is completed. We discussed portion control, increasing exercise and consulting a Bariatric surgeon..     Assessment/Plan   Diagnoses and all orders for this visit:    1. Class 2 severe obesity due to excess calories with serious comorbidity and body mass index (BMI) of 39.0 to 39.9 in adult (HCC) (Primary)  -     ECG 12 Lead; Future  -     Ambulatory Referral to Psychiatry    2. Mixed hyperlipidemia  Comments:  Nutritional counseling provided.  Encouraged to continue current regimen.    3. Multilevel degenerative disc disease  Comments:  Patient follows Dr. Foster.  States he highly recommends weight loss for assistance with disc disease and symptoms.    4. Snoring  Comments:  Sleep study referral placed today.    5. Nicotine abuse  Comments:  Smoking cessation counseling provided.  Patient encouraged to follow-up with PCP for medication to assist with nicotine use and anxiety.    6. Chronic fatigue  Comments:  Sleep study ordered for evaluation of obstructive sleep apnea.         Lottie NOBLE Kiarra was seen today for follow-up, obesity, nutrition counseling and weight loss.  She has lost weight since her last visit.  Today we  discussed healthy changes in lifestyle, diet, and exercise. Dietician consultation obtained.  Lottie AIDE Plunkett had received handouts to her explaining the recommendation on portion sizes/appetite control/reading nutrition labels.   Intensive behavioral therapy for obesity was done today as well.     Goals for this month are:   1.  Continue following prescription meal plan.  Patient is having some obstacles and personal stressors and has done very well overcoming them.  2.  EKG ordered today.  3.  Patient educated on importance of nicotine cessation.  I encouraged patient to follow-up with PCP to discuss medication for anxiety in smoking cessation.  4.  Psychiatric order placed today.  Patient encouraged to have appointment scheduled with them before her next visit with us.  5.  Patient has appointment with neurology.  I reached out to JUANA Ling requesting a sooner appointment for evaluation of EDDIE.    Follow up in one month for a weight recheck.

## 2022-05-24 ENCOUNTER — OFFICE VISIT (OUTPATIENT)
Dept: BARIATRICS/WEIGHT MGMT | Facility: CLINIC | Age: 37
End: 2022-05-24

## 2022-05-24 VITALS
DIASTOLIC BLOOD PRESSURE: 76 MMHG | BODY MASS INDEX: 36.99 KG/M2 | TEMPERATURE: 98.1 F | HEIGHT: 65 IN | OXYGEN SATURATION: 97 % | WEIGHT: 222 LBS | SYSTOLIC BLOOD PRESSURE: 108 MMHG | HEART RATE: 78 BPM

## 2022-05-24 DIAGNOSIS — E66.01 CLASS 2 SEVERE OBESITY DUE TO EXCESS CALORIES WITH SERIOUS COMORBIDITY AND BODY MASS INDEX (BMI) OF 39.0 TO 39.9 IN ADULT: Primary | ICD-10-CM

## 2022-05-24 DIAGNOSIS — E78.2 MIXED HYPERLIPIDEMIA: ICD-10-CM

## 2022-05-24 DIAGNOSIS — R06.83 SNORING: ICD-10-CM

## 2022-05-24 PROCEDURE — 99213 OFFICE O/P EST LOW 20 MIN: CPT | Performed by: SURGERY

## 2022-05-24 NOTE — PROGRESS NOTES
"Patient Care Team:  Shaunna Choudhury APRN as PCP - General (Otolaryngology)    Reason for Visit:  Surgical Weight loss    Subjective   Lottie Plunkett is a 36 y.o. female.     Lottie is here for follow-up and continued medical management of her morbid obesity.  She is currently on a prescription diet.  Lottie previously was to apply dietary changes such as following the meal plan as directed.  She admits to following the dietary prescription.  As a result she lost weight since her last visit.  The patient still is participating in nicotine products however.    Review Of Systems:  General ROS: positive for  - sleep disturbance  Respiratory ROS: no cough, shortness of breath, or wheezing  Cardiovascular ROS: no chest pain or dyspnea on exertion  Gastrointestinal ROS: no abdominal pain, change in bowel habits, or black or bloody stools    The following portions of the patient's history were reviewed and updated as appropriate: allergies, current medications, past family history, past medical history, past social history, past surgical history and problem list.    Objective   /76 (BP Location: Right arm, Patient Position: Sitting, Cuff Size: Adult)   Pulse 78   Temp 98.1 °F (36.7 °C)   Ht 165.1 cm (65\")   Wt 101 kg (222 lb)   SpO2 97%   BMI 36.94 kg/m²       05/24/22  1313   Weight: 101 kg (222 lb)       General Appearance:  awake, alert, oriented, in no acute distress    Assessment & Plan     Encounter Diagnoses   Name Primary?   • Class 2 severe obesity due to excess calories with serious comorbidity and body mass index (BMI) of 39.0 to 39.9 in adult ([facility]) Yes   • Snoring    • Mixed hyperlipidemia        Lottie Plunkett was seen today for follow-up, obesity, nutrition counseling and weight loss.  She has lost weight since her last visit.  Today we discussed healthy changes in lifestyle, diet, and exercise. Dietician consultation obtained.  Lottie Plunkett had received handouts to her explaining the " recommendation on portion sizes/appetite control/reading nutrition labels.   Intensive behavioral therapy for obesity was done today as well.   Goals for this month are: She is to follow-up with dietary prescription as directed.  And she is to wean herself off of nicotine products prior to consideration for surgery.    Follow up in one month for a weight recheck.

## 2022-06-22 ENCOUNTER — TELEPHONE (OUTPATIENT)
Dept: BARIATRICS/WEIGHT MGMT | Facility: CLINIC | Age: 37
End: 2022-06-22

## 2022-06-22 NOTE — TELEPHONE ENCOUNTER
Patient sent in Pricing Assistant message that she had prolonged covid exposure and canceled appointment.    I called patient and informed her that once we know if she has covid or not we will get her worked back in.

## 2022-08-09 ENCOUNTER — E-VISIT (OUTPATIENT)
Dept: PRIMARY CARE CLINIC | Age: 37
End: 2022-08-09
Payer: MEDICAID

## 2022-08-09 DIAGNOSIS — U07.1 COVID-19: Primary | ICD-10-CM

## 2022-08-09 PROCEDURE — 99213 OFFICE O/P EST LOW 20 MIN: CPT | Performed by: NURSE PRACTITIONER

## 2022-08-09 RX ORDER — DEXAMETHASONE 1.5 MG/1
1 TABLET ORAL DAILY
Qty: 21 EACH | Refills: 0 | Status: SHIPPED | OUTPATIENT
Start: 2022-08-09

## 2022-08-09 RX ORDER — DEXTROMETHORPHAN HYDROBROMIDE AND PROMETHAZINE HYDROCHLORIDE 15; 6.25 MG/5ML; MG/5ML
5 SYRUP ORAL 4 TIMES DAILY PRN
Qty: 180 ML | Refills: 0 | Status: SHIPPED | OUTPATIENT
Start: 2022-08-09 | End: 2022-08-16

## 2022-08-09 ASSESSMENT — LIFESTYLE VARIABLES
SMOKING_YEARS: 18
PACKS_PER_DAY: 1.5
SMOKING_STATUS: NO, BUT I USED TO SMOKE

## 2022-08-09 NOTE — PROGRESS NOTES
Pt states she has been out of town with work and has not been taking her medication in a couple weeks .

## 2022-08-09 NOTE — PROGRESS NOTES
Prakash Poon D Karolina (:  1985) is a Established patient, here for evaluation of the following:    Assessment & Plan   Below is the assessment and plan developed based on review of pertinent history, physical exam, labs, studies, and medications. 1. COVID-19  -     nirmatrelvir/ritonavir (PAXLOVID) 20 x 150 MG & 10 x 100MG; Take 3 tablets (two 150 mg nirmatrelvir and one 100 mg ritonavir tablets) by mouth every 12 hours for 5 days. , Disp-30 tablet, R-0Normal  -     Dexamethasone (DEXPAK 6 DAY) 1.5 MG (21) TBPK; Take 1 each by mouth daily, Disp-21 each, R-0Normal  -     promethazine-dextromethorphan (PROMETHAZINE-DM) 6.25-15 MG/5ML syrup; Take 5 mLs by mouth 4 times daily as needed for Cough, Disp-180 mL, R-0Normal  No follow-ups on file. Subjective   HPI  Review of Systems  Cough, shortness of breath, fever, chills, sore throat. All other systems negative. Patient presents today for evaluation of positive covid test on  of this week. She states she started with fever and chills but now has cough and sore throat. She does have albuterol inhaler she has not used. She denies wheezing or chest pain. She has not take OTC meds.      Objective   Patient-Reported Vitals  No data recorded     Physical Exam  [INSTRUCTIONS:  \"[x]\" Indicates a positive item  \"[]\" Indicates a negative item  -- DELETE ALL ITEMS NOT EXAMINED]    Constitutional: [x] Appears well-developed and well-nourished [x] No apparent distress      [] Abnormal -     Mental status: [x] Alert and awake  [x] Oriented to person/place/time [x] Able to follow commands    [] Abnormal -     Eyes:   EOM    [x]  Normal    [] Abnormal -   Sclera  [x]  Normal    [] Abnormal -          Discharge [x]  None visible   [] Abnormal -     HENT: [x] Normocephalic, atraumatic  [] Abnormal -   [x] Mouth/Throat: Mucous membranes are moist    External Ears [x] Normal  [] Abnormal -    Neck: [x] No visualized mass [] Abnormal -     Pulmonary/Chest: [x] Respiratory effort normal   [x] No visualized signs of difficulty breathing or respiratory distress        [] Abnormal -      Musculoskeletal:   [x] Normal gait with no signs of ataxia         [x] Normal range of motion of neck        [] Abnormal -     Neurological:        [x] No Facial Asymmetry (Cranial nerve 7 motor function) (limited exam due to video visit)          [x] No gaze palsy        [] Abnormal -          Skin:        [x] No significant exanthematous lesions or discoloration noted on facial skin         [] Abnormal -            Psychiatric:       [x] Normal Affect [] Abnormal -        [x] No Hallucinations    Other pertinent observable physical exam findings:-         On this date 8/9/2022 I have spent 20 minutes reviewing previous notes, test results and face to face (virtual) with the patient discussing the diagnosis and importance of compliance with the treatment plan as well as documenting on the day of the visit. Rigo Turcios was evaluated through a synchronous (real-time) audio-video encounter. The patient (or guardian if applicable) is aware that this is a billable service, which includes applicable co-pays. This Virtual Visit was conducted with patient's (and/or legal guardian's) consent. The visit was conducted pursuant to the emergency declaration under the [facility], [address] [place] [address] authority and the [employer] Act. Patient identification was verified, and a caregiver was present when appropriate. The patient was located at Home: [address] [place] [address].    Provider was [facility] [address] JOSELINE Garrison

## 2023-07-17 SDOH — ECONOMIC STABILITY: FOOD INSECURITY: WITHIN THE PAST 12 MONTHS, THE FOOD YOU BOUGHT JUST DIDN'T LAST AND YOU DIDN'T HAVE MONEY TO GET MORE.: NEVER TRUE

## 2023-07-17 SDOH — ECONOMIC STABILITY: FOOD INSECURITY: WITHIN THE PAST 12 MONTHS, YOU WORRIED THAT YOUR FOOD WOULD RUN OUT BEFORE YOU GOT MONEY TO BUY MORE.: SOMETIMES TRUE

## 2023-07-17 SDOH — ECONOMIC STABILITY: INCOME INSECURITY: HOW HARD IS IT FOR YOU TO PAY FOR THE VERY BASICS LIKE FOOD, HOUSING, MEDICAL CARE, AND HEATING?: NOT VERY HARD

## 2023-07-17 SDOH — ECONOMIC STABILITY: TRANSPORTATION INSECURITY
IN THE PAST 12 MONTHS, HAS LACK OF TRANSPORTATION KEPT YOU FROM MEETINGS, WORK, OR FROM GETTING THINGS NEEDED FOR DAILY LIVING?: NO

## 2023-07-17 SDOH — ECONOMIC STABILITY: HOUSING INSECURITY
IN THE LAST 12 MONTHS, WAS THERE A TIME WHEN YOU DID NOT HAVE A STEADY PLACE TO SLEEP OR SLEPT IN A SHELTER (INCLUDING NOW)?: NO

## 2023-07-17 ASSESSMENT — PATIENT HEALTH QUESTIONNAIRE - PHQ9
2. FEELING DOWN, DEPRESSED OR HOPELESS: 0
SUM OF ALL RESPONSES TO PHQ9 QUESTIONS 1 & 2: 1
2. FEELING DOWN, DEPRESSED OR HOPELESS: NOT AT ALL
1. LITTLE INTEREST OR PLEASURE IN DOING THINGS: 1
SUM OF ALL RESPONSES TO PHQ QUESTIONS 1-9: 1
SUM OF ALL RESPONSES TO PHQ9 QUESTIONS 1 & 2: 1
SUM OF ALL RESPONSES TO PHQ QUESTIONS 1-9: 1
1. LITTLE INTEREST OR PLEASURE IN DOING THINGS: SEVERAL DAYS
SUM OF ALL RESPONSES TO PHQ QUESTIONS 1-9: 1
SUM OF ALL RESPONSES TO PHQ QUESTIONS 1-9: 1

## 2023-07-18 ENCOUNTER — OFFICE VISIT (OUTPATIENT)
Dept: PRIMARY CARE CLINIC | Age: 38
End: 2023-07-18
Payer: COMMERCIAL

## 2023-07-18 ENCOUNTER — PATIENT MESSAGE (OUTPATIENT)
Dept: PRIMARY CARE CLINIC | Age: 38
End: 2023-07-18

## 2023-07-18 VITALS
TEMPERATURE: 97.4 F | SYSTOLIC BLOOD PRESSURE: 118 MMHG | DIASTOLIC BLOOD PRESSURE: 82 MMHG | BODY MASS INDEX: 40.97 KG/M2 | HEIGHT: 64 IN | OXYGEN SATURATION: 98 % | HEART RATE: 83 BPM | WEIGHT: 240 LBS

## 2023-07-18 DIAGNOSIS — M54.9 CHRONIC BACK PAIN, UNSPECIFIED BACK LOCATION, UNSPECIFIED BACK PAIN LATERALITY: ICD-10-CM

## 2023-07-18 DIAGNOSIS — Z00.00 ENCOUNTER FOR ANNUAL PHYSICAL EXAM: Primary | ICD-10-CM

## 2023-07-18 DIAGNOSIS — N92.6 IRREGULAR MENSES: ICD-10-CM

## 2023-07-18 DIAGNOSIS — Z00.00 ENCOUNTER FOR ANNUAL PHYSICAL EXAM: ICD-10-CM

## 2023-07-18 DIAGNOSIS — R53.83 FATIGUE, UNSPECIFIED TYPE: Primary | ICD-10-CM

## 2023-07-18 DIAGNOSIS — G89.29 CHRONIC BACK PAIN, UNSPECIFIED BACK LOCATION, UNSPECIFIED BACK PAIN LATERALITY: ICD-10-CM

## 2023-07-18 LAB
25(OH)D3 SERPL-MCNC: 16.3 NG/ML
ALBUMIN SERPL-MCNC: 4 G/DL (ref 3.5–5.2)
ALP SERPL-CCNC: 73 U/L (ref 35–104)
ALT SERPL-CCNC: 36 U/L (ref 5–33)
ANION GAP SERPL CALCULATED.3IONS-SCNC: 9 MMOL/L (ref 7–19)
AST SERPL-CCNC: 24 U/L (ref 5–32)
BACTERIA URNS QL MICRO: ABNORMAL /HPF
BASOPHILS # BLD: 0 K/UL (ref 0–0.2)
BASOPHILS NFR BLD: 0.7 % (ref 0–1)
BILIRUB SERPL-MCNC: <0.2 MG/DL (ref 0.2–1.2)
BILIRUB UR QL STRIP: NEGATIVE
BUN SERPL-MCNC: 17 MG/DL (ref 6–20)
CALCIUM SERPL-MCNC: 9.4 MG/DL (ref 8.6–10)
CHLORIDE SERPL-SCNC: 104 MMOL/L (ref 98–111)
CHOLEST SERPL-MCNC: 228 MG/DL (ref 160–199)
CLARITY UR: CLEAR
CO2 SERPL-SCNC: 27 MMOL/L (ref 22–29)
COLOR UR: YELLOW
CREAT SERPL-MCNC: 0.7 MG/DL (ref 0.5–0.9)
CRYSTALS URNS MICRO: ABNORMAL /HPF
EOSINOPHIL # BLD: 0.2 K/UL (ref 0–0.6)
EOSINOPHIL NFR BLD: 2.8 % (ref 0–5)
EPI CELLS #/AREA URNS AUTO: 3 /HPF (ref 0–5)
ERYTHROCYTE [DISTWIDTH] IN BLOOD BY AUTOMATED COUNT: 13.4 % (ref 11.5–14.5)
GLUCOSE SERPL-MCNC: 117 MG/DL (ref 74–109)
GLUCOSE UR STRIP.AUTO-MCNC: NEGATIVE MG/DL
HBA1C MFR BLD: 5.6 % (ref 4–6)
HCT VFR BLD AUTO: 40.5 % (ref 37–47)
HCV AB SERPL QL IA: NORMAL
HDLC SERPL-MCNC: 49 MG/DL (ref 65–121)
HGB BLD-MCNC: 12.8 G/DL (ref 12–16)
HGB UR STRIP.AUTO-MCNC: ABNORMAL MG/L
HIV-1 P24 AG: NORMAL
HIV1+2 AB SERPLBLD QL IA.RAPID: NORMAL
HYALINE CASTS #/AREA URNS AUTO: 2 /HPF (ref 0–8)
IMM GRANULOCYTES # BLD: 0 K/UL
KETONES UR STRIP.AUTO-MCNC: NEGATIVE MG/DL
LDLC SERPL CALC-MCNC: 158 MG/DL
LEUKOCYTE ESTERASE UR QL STRIP.AUTO: NEGATIVE
LYMPHOCYTES # BLD: 1.9 K/UL (ref 1.1–4.5)
LYMPHOCYTES NFR BLD: 32.9 % (ref 20–40)
MCH RBC QN AUTO: 28.4 PG (ref 27–31)
MCHC RBC AUTO-ENTMCNC: 31.6 G/DL (ref 33–37)
MCV RBC AUTO: 89.8 FL (ref 81–99)
MONOCYTES # BLD: 0.5 K/UL (ref 0–0.9)
MONOCYTES NFR BLD: 8 % (ref 0–10)
NEUTROPHILS # BLD: 3.1 K/UL (ref 1.5–7.5)
NEUTS SEG NFR BLD: 55.2 % (ref 50–65)
NITRITE UR QL STRIP.AUTO: NEGATIVE
PH UR STRIP.AUTO: 5.5 [PH] (ref 5–8)
PLATELET # BLD AUTO: 344 K/UL (ref 130–400)
PMV BLD AUTO: 9.6 FL (ref 9.4–12.3)
POTASSIUM SERPL-SCNC: 4.5 MMOL/L (ref 3.5–5)
PROT SERPL-MCNC: 7.4 G/DL (ref 6.6–8.7)
PROT UR STRIP.AUTO-MCNC: NEGATIVE MG/DL
RBC # BLD AUTO: 4.51 M/UL (ref 4.2–5.4)
RBC #/AREA URNS AUTO: 2 /HPF (ref 0–4)
SODIUM SERPL-SCNC: 140 MMOL/L (ref 136–145)
SP GR UR STRIP.AUTO: 1.02 (ref 1–1.03)
TRIGL SERPL-MCNC: 106 MG/DL (ref 0–149)
TSH SERPL DL<=0.005 MIU/L-ACNC: 2.92 UIU/ML (ref 0.27–4.2)
UROBILINOGEN UR STRIP.AUTO-MCNC: 0.2 E.U./DL
WBC # BLD AUTO: 5.7 K/UL (ref 4.8–10.8)
WBC #/AREA URNS AUTO: 2 /HPF (ref 0–5)

## 2023-07-18 PROCEDURE — 99395 PREV VISIT EST AGE 18-39: CPT | Performed by: NURSE PRACTITIONER

## 2023-07-18 PROCEDURE — 99213 OFFICE O/P EST LOW 20 MIN: CPT | Performed by: NURSE PRACTITIONER

## 2023-07-18 ASSESSMENT — ENCOUNTER SYMPTOMS
VOMITING: 0
VOICE CHANGE: 0
NAUSEA: 0
COUGH: 0
PHOTOPHOBIA: 0
BACK PAIN: 0
SHORTNESS OF BREATH: 0
COLOR CHANGE: 0
RHINORRHEA: 0

## 2023-07-18 NOTE — PATIENT INSTRUCTIONS
Referral to bariatrics- okay to scheduled own appointment. Referral given for chiropractor- patient to make own appointment. Referral to OB/GYN- will call with appointment. Fasting labs in [address] today.    Follow-up annually for physical.

## 2023-07-26 ENCOUNTER — TELEPHONE (OUTPATIENT)
Dept: PRIMARY CARE CLINIC | Age: 38
End: 2023-07-26

## 2023-07-26 DIAGNOSIS — R79.9 ABNORMAL BLOOD CHEMISTRY: ICD-10-CM

## 2023-07-26 DIAGNOSIS — E55.9 VITAMIN D DEFICIENCY: Primary | ICD-10-CM

## 2023-07-26 RX ORDER — SIMVASTATIN 10 MG
10 TABLET ORAL NIGHTLY
Qty: 90 TABLET | Refills: 1 | Status: SHIPPED | OUTPATIENT
Start: 2023-07-26

## 2023-07-26 RX ORDER — ERGOCALCIFEROL 1.25 MG/1
50000 CAPSULE ORAL WEEKLY
Qty: 12 CAPSULE | Refills: 1 | Status: SHIPPED | OUTPATIENT
Start: 2023-07-26

## 2023-07-26 NOTE — TELEPHONE ENCOUNTER
Kesha Mcnamara called requesting a refill of the below medication which has been pended for you:   Per [employer] results  Requested Prescriptions     Pending Prescriptions Disp Refills    vitamin D (ERGOCALCIFEROL) 1.25 MG (92137 UT) CAPS capsule 12 capsule 1     Sig: Take 1 capsule by mouth once a week    simvastatin (ZOCOR) 10 MG tablet 90 tablet 1     Sig: Take 1 tablet by mouth nightly       Last Appointment Date: 7/18/2023  Next Appointment Date: Visit date not found    No Known Allergies

## 2023-07-28 ENCOUNTER — TELEPHONE (OUTPATIENT)
Dept: PRIMARY CARE CLINIC | Age: 38
End: 2023-07-28

## 2023-07-28 DIAGNOSIS — E78.5 ELEVATED LIPIDS: Primary | ICD-10-CM

## 2023-07-28 DIAGNOSIS — E55.9 VITAMIN D DEFICIENCY: ICD-10-CM

## 2023-07-28 RX ORDER — ERGOCALCIFEROL 1.25 MG/1
50000 CAPSULE ORAL WEEKLY
Qty: 12 CAPSULE | Refills: 1 | Status: CANCELLED | OUTPATIENT
Start: 2023-07-28

## 2023-07-28 RX ORDER — SIMVASTATIN 10 MG
10 TABLET ORAL NIGHTLY
Qty: 90 TABLET | Refills: 1 | Status: CANCELLED | OUTPATIENT
Start: 2023-07-28

## 2023-07-28 NOTE — TELEPHONE ENCOUNTER
----- Message from JOSELINE Esteban sent at 7/20/2023 12:07 PM CDT -----  Please inform patient of vitamin d deficiency. I recommend 50,000 units once weekly for 12 weeks. Repeat level in 3 months. Lipids elevated; I would like her to begin simvastatin 10 mg nightly. Will recheck labs in 6 months.

## 2023-08-04 ENCOUNTER — OFFICE VISIT (OUTPATIENT)
Dept: PRIMARY CARE CLINIC | Age: 38
End: 2023-08-04
Payer: COMMERCIAL

## 2023-08-04 VITALS
HEART RATE: 84 BPM | WEIGHT: 247 LBS | DIASTOLIC BLOOD PRESSURE: 84 MMHG | OXYGEN SATURATION: 97 % | HEIGHT: 64 IN | TEMPERATURE: 97.2 F | BODY MASS INDEX: 42.17 KG/M2 | SYSTOLIC BLOOD PRESSURE: 124 MMHG

## 2023-08-04 DIAGNOSIS — G43.909 MIGRAINE WITHOUT STATUS MIGRAINOSUS, NOT INTRACTABLE, UNSPECIFIED MIGRAINE TYPE: Primary | ICD-10-CM

## 2023-08-04 DIAGNOSIS — L65.9 HAIR LOSS: ICD-10-CM

## 2023-08-04 DIAGNOSIS — R53.83 FATIGUE, UNSPECIFIED TYPE: ICD-10-CM

## 2023-08-04 LAB — T4 FREE SERPL-MCNC: 0.97 NG/DL (ref 0.93–1.7)

## 2023-08-04 PROCEDURE — 99214 OFFICE O/P EST MOD 30 MIN: CPT | Performed by: NURSE PRACTITIONER

## 2023-08-04 ASSESSMENT — ENCOUNTER SYMPTOMS
COLOR CHANGE: 0
PHOTOPHOBIA: 0
VOMITING: 0
NAUSEA: 0
SHORTNESS OF BREATH: 0
BACK PAIN: 0
RHINORRHEA: 0
COUGH: 0
VOICE CHANGE: 0

## 2023-08-06 LAB
T3 SERPL-MCNC: 99 NG/DL (ref 80–200)
THYROPEROXIDASE AB SERPL-ACNC: <0.3 IU/ML (ref 0–9)

## 2023-08-07 LAB — TSI SER-ACNC: <0.1 IU/L

## 2023-08-09 ENCOUNTER — OFFICE VISIT (OUTPATIENT)
Dept: OBGYN CLINIC | Age: 38
End: 2023-08-09
Payer: COMMERCIAL

## 2023-08-09 ENCOUNTER — TELEPHONE (OUTPATIENT)
Dept: BARIATRICS/WEIGHT MGMT | Facility: CLINIC | Age: 38
End: 2023-08-09
Payer: COMMERCIAL

## 2023-08-09 VITALS
BODY MASS INDEX: 41.48 KG/M2 | HEIGHT: 64 IN | SYSTOLIC BLOOD PRESSURE: 118 MMHG | HEART RATE: 84 BPM | WEIGHT: 243 LBS | DIASTOLIC BLOOD PRESSURE: 81 MMHG

## 2023-08-09 DIAGNOSIS — N92.6 IRREGULAR BLEEDING: Primary | ICD-10-CM

## 2023-08-09 DIAGNOSIS — N94.6 DYSMENORRHEA: ICD-10-CM

## 2023-08-09 DIAGNOSIS — N92.0 MENORRHAGIA WITH REGULAR CYCLE: ICD-10-CM

## 2023-08-09 DIAGNOSIS — Z76.89 ENCOUNTER TO ESTABLISH CARE: Primary | ICD-10-CM

## 2023-08-09 LAB — THYROGLOB SERPL-MCNC: 14.5 NG/ML (ref 1.3–31.8)

## 2023-08-09 PROCEDURE — 99203 OFFICE O/P NEW LOW 30 MIN: CPT

## 2023-08-09 NOTE — PROGRESS NOTES
Pt is here for a new patient visit for irregular periods. This has been going on for 3 years. They are very heavy. She is on day 10 of super plus tampons. Diva cups. And she can wear a pad and tampon. She has a lot of blood clots and a lot of pain. Vomiting and diarrhea. Pain radiates to other areas. She has had ovarian cysts so she knows that pain.

## 2023-08-09 NOTE — TELEPHONE ENCOUNTER
Patient was called to remind them of their new patient class and to bring completed paperwork, arrive 30mins early or if paperwork is not completed arrive 60 minutes early, also they were instructed to go to [place] [id number] to sign up for the patient portal, view seminar and complete the quiz before the appt. Patient was also informed that we will do a glucose finger stick at appt. The patient was advised this is a long appointment so expect to be here at least 2 hours. The patient was agreeable and voiced an understanding.     Patient has completed B360 and confirmed her appt

## 2023-08-09 NOTE — PROGRESS NOTES
[facility]  CNM Office Note    Sravan Turcios is a 43 y.o. female who presents today for her medical conditions/ complaints as noted below. Chief Complaint   Patient presents with    Other     HPI  Metaline Fallstony Berumen presents today to establish care. She complains of extremely heavy periods that are lasting 9-10 days. They are so heavy she has to use a diva cup with a super absorbency pad for back up. She reports many blood clots, some of them are large. The periods are painful, she experiences intense cramping as well as sharp pain. She has a history of ovarian cysts. She reports changes in her bowel habits during her period, often having diarrhea. When the sharp pains come, they are debilitating and take her breath away. She reports the pain also travels into her low back, hips, and down her legs. As they have gotten worse, her vaginal canal is also achy during her period. She gets some relief from ibuprofen, heating pads, and flexeril that she is prescribed for her back. Her cycles are regular. She reports she does have a history of abnormal pap smears and had a LEEP 5-6 years ago. She was a heavy smoker but has tapered down using a vape device, although she does still use nicotine. She uses condoms for birth control and does not desire fertility. She is initiating care with a bariatric group soon. Problems/Complaints today:  1. Encounter to establish care  2. Menorrhagia with regular cycle  3. Dysmenorrhea       Patient Active Problem List   Diagnosis    Anxiety    Chronic back pain       Patient's last menstrual period was 2023 (exact date).   [id number]    Past Medical History:   Diagnosis Date    Anxiety     Chronic back pain     OCD (obsessive compulsive disorder)      Past Surgical History:   Procedure Laterality Date     SECTION  2003    LUMBAR DISCECTOMY      LUMBAR LAMINECTOMY       Family History   Problem Relation Age of Onset    Alzheimer's Disease Paternal Grandfather     Lupus

## 2023-08-10 ENCOUNTER — OFFICE VISIT (OUTPATIENT)
Dept: BARIATRICS/WEIGHT MGMT | Facility: CLINIC | Age: 38
End: 2023-08-10
Payer: COMMERCIAL

## 2023-08-10 ENCOUNTER — PATIENT ROUNDING (BHMG ONLY) (OUTPATIENT)
Dept: BARIATRICS/WEIGHT MGMT | Facility: CLINIC | Age: 38
End: 2023-08-10
Payer: COMMERCIAL

## 2023-08-10 ENCOUNTER — NUTRITION (OUTPATIENT)
Dept: BARIATRICS/WEIGHT MGMT | Facility: CLINIC | Age: 38
End: 2023-08-10
Payer: COMMERCIAL

## 2023-08-10 VITALS
HEIGHT: 65 IN | TEMPERATURE: 98.4 F | SYSTOLIC BLOOD PRESSURE: 109 MMHG | DIASTOLIC BLOOD PRESSURE: 70 MMHG | OXYGEN SATURATION: 99 % | HEART RATE: 71 BPM | WEIGHT: 247.6 LBS | BODY MASS INDEX: 41.25 KG/M2

## 2023-08-10 DIAGNOSIS — E66.01 CLASS 3 SEVERE OBESITY DUE TO EXCESS CALORIES WITH SERIOUS COMORBIDITY AND BODY MASS INDEX (BMI) OF 40.0 TO 44.9 IN ADULT: Primary | ICD-10-CM

## 2023-08-10 DIAGNOSIS — E78.2 MIXED HYPERLIPIDEMIA: ICD-10-CM

## 2023-08-10 DIAGNOSIS — Z72.0 NICOTINE ABUSE: ICD-10-CM

## 2023-08-10 RX ORDER — SIMVASTATIN 10 MG
1 TABLET ORAL NIGHTLY
COMMUNITY
Start: 2023-07-26

## 2023-08-10 NOTE — PROGRESS NOTES
Patient Care Team:  Shaunna Choudhury APRN as PCP - General (Otolaryngology)      Subjective     Patient is a 38 y.o. female presents with morbid obesity and her Body mass index is 41.2 kg/mý.     Patient was referred to our practice by Self .  She is here for discussion of surgical weight loss options.  She stated she has been with the disease of obesity for more than 10 years.  She stated she suffers from Hyperlipidemia  and morbid obesity due to her weight gain.  She stated that weight loss helps alleviate these symptoms.   she has tried Low carb and calorie counting . she stated that she has attempted these conservative methods for weight loss without maintaining long term success.  Today she would like to discuss surgical weight loss options such as the Laparoscopic Sleeve Gastrectomy or the Laparoscopic R - Y Gastric Bypass.     she states the weight worsened around 12 years ago and the highest weight was today at 247 lbs.    Review of Systems   Constitutional:  Positive for fatigue.   Respiratory: Negative.     Cardiovascular: Negative.    Gastrointestinal: Negative.    Endocrine: Negative.    Musculoskeletal: Negative.    Psychiatric/Behavioral: Negative.        History  Past Medical History:   Diagnosis Date    Allergic     Anxiety     Arthritis     Chronic pain disorder     TWO BACK SURGERY'S    H/O streptococcal infection     Urinary tract infection       Past Surgical History:   Procedure Laterality Date     SECTION  2003    SPINE SURGERY      Lumbar Laminectomy (12), Lumbar Disc Replacement (2016)      Social History     Socioeconomic History    Marital status: Legally    Tobacco Use    Smoking status: Former     Packs/day: 0.25     Years: 11.00     Pack years: 2.75     Types: Cigarettes     Quit date: 2022     Years since quittin.2    Smokeless tobacco: Never    Tobacco comments:     vapes 5ml   Substance and Sexual Activity    Alcohol use: Yes      Alcohol/week: 2.0 standard drinks     Types: 2 Cans of beer per week     Comment: 1-2 DAILY    Drug use: No    Sexual activity: Yes     Partners: Male     Birth control/protection: Condom      Family History   Problem Relation Age of Onset    Obesity Mother     Cancer Father     Obesity Paternal Grandmother     Stroke Paternal Grandmother       Allergies   Allergen Reactions    Mangifera Indica Fruit Ext ([place]) [Mangifera Indica] Anaphylaxis    Mushroom Nausea And Vomiting          Current Outpatient Medications:     ACETAMINOPHEN PO, Take  by mouth As Needed (last taken at 10 p.m. on last night)., Disp: , Rfl:     albuterol (PROVENTIL HFA;VENTOLIN HFA) 108 (90 Base) MCG/ACT inhaler, Inhale 2 puffs Every 4 (Four) Hours As Needed for Wheezing or Shortness of Air., Disp: 1 inhaler, Rfl: 0    calcium citrate-vitamin d (CITRACAL) 200-250 MG-UNIT tablet tablet, Take  by mouth Daily., Disp: , Rfl:     ibuprofen (ADVIL,MOTRIN) 800 MG tablet, Take 1 tablet by mouth Every 6 (Six) Hours As Needed for Mild Pain., Disp: , Rfl:     multivitamin with minerals tablet tablet, Take 1 tablet by mouth Daily., Disp: , Rfl:     simvastatin (ZOCOR) 10 MG tablet, Take 1 tablet by mouth Every Night., Disp: , Rfl:     vitamin D (ERGOCALCIFEROL) 1.25 MG (24188 UT) capsule capsule, Take 1 capsule by mouth 1 (One) Time Per Week., Disp: , Rfl:     Objective     Vital Signs  Temp:  [98.4 øF (36.9 øC)] 98.4 øF (36.9 øC)  Heart Rate:  [71] 71  BP: (109)/(70) 109/70  Body mass index is 41.2 kg/mý.      08/10/23  1005   Weight: 112 kg (247 lb 9.6 oz)            Physical Exam  Vitals reviewed.   Constitutional:       Appearance: She is obese.   Cardiovascular:      Rate and Rhythm: Normal rate and regular rhythm.   Pulmonary:      Effort: Pulmonary effort is normal.   Abdominal:      General: Bowel sounds are normal.      Palpations: Abdomen is soft.   Musculoskeletal:         General: Normal range of motion.   Skin:     General: Skin is warm and  dry.   Neurological:      Mental Status: She is alert and oriented to person, place, and time.   Psychiatric:         Mood and Affect: Mood normal.         Behavior: Behavior normal.          Results Review:   I reviewed the patient's new clinical results.      Assessment & Plan   Diagnoses and all orders for this visit:    1. Class 3 severe obesity due to excess calories with serious comorbidity and body mass index (BMI) of 40.0 to 44.9 in adult (Primary)  -     POC Glucose Fingerstick    2. Nicotine abuse  Comments:  Nicotine cessation counseling provided    3. Mixed hyperlipidemia        I discussed the patient's findings and my recommendations with patient.     I have also recommended that she obtain a cardiac risk assessment and psychiatric evaluation as well as any other preoperative requirements prior to surgery consideration.      Patient is a 38 y.o. female who has morbid obesity with Body mass index is 41.2 kg/mý. and desires surgical weight loss. Patient has been advised that this surgery is considered to be elective major surgery that is typically done laparoscopically. Patient is a potentially good surgical candidate. Patient will need to meet with the Bariatric Surgeon to further discuss surgical options. Patient has been advised that they will need to have a work-up prior to surgery. This work-up will include but is not limited to an EKG, an EGD to assess for H. Pylori and Ocampo's esophagus, and a psychological evaluation, with additional testing as necessary. Pre-op testing will be ordered at next visit. Today the patient  received the 4 meals/day diet prescription, which was explained to patient.  Patient will see the dietitian today to further discuss goals for diet, exercise, and lifestyle. Patient has received intensive behavioral therapy for obesity today. I explained the pathophysiology of the disease and its storage component. We also discussed Dr. Ryan' pearls of the program. Nutrition  counseling ordered today.     2. Current comorbid condition of Hyperlipidemia  associated with she morbid obesity is reported to be stable on she current treatment regimen and medications. We anticipate the comorbid condition to improve as we address she morbid obesity.     Lottie Plunkett  reports that she quit smoking about 15 months ago. Her smoking use included cigarettes. She has a 2.75 pack-year smoking history. She has never used smokeless tobacco.. I have educated her on the risk of diseases from using tobacco products such as cancer, COPD, and heart disease.     I advised her to quit and she is willing to quit. We have discussed the following method/s for tobacco cessation:  Education Material Counseling.  Together we have set a quit date for 1 month from today.  She will follow up with me in 1 month or sooner to check on her progress.    I spent 4 minutes counseling the patient.           Pre-op testing:     Seminar - Completed  EGD - Needed, but not yet ordered  H. Pylori - Will be done with EGD   H. Pylori Stool -  N/A    Psychological Evaluation - Needed, but not yet ordered    EKG - Needed, but not yet ordered    Cardiology - If EKG abnormal, cardiology will be ordered     TSH - Needed, but not yet ordered  Nicotine - N/A    Pulmonary Clearance - N/A   Drug Tests - N/A    Dietitian - Completed      Patient will begin GREEN meal plan.  A total of 30 minutes was spent face to face with this patient and over half of the time was spent on counseling and coordination of care for the disease of obesity. We specifically reviewed the dietary prescription and I made recommendations toward increasing exercise as tolerated as well as focusing on training their behavior toward storing less.    Beth Rivero, JUANA     08/10/23  10:17 CDT

## 2023-08-10 NOTE — PROGRESS NOTES
"Metabolic and Bariatric Surgery Adult Nutrition Assessment    Patient Name: Lottie Plunkett   YOB: 1985   MRN: [id number]     Assessment Date:  08/10/2023     Reason for Visit: Initial Nutrition Assessment     Treatment Pathway: Preoperative Bariatric Surgery, Visit 1    Assessment    Anthropometrics   Wt Readings from Last 1 Encounters:   08/10/23 112 kg (247 lb 9.6 oz)     Ht Readings from Last 1 Encounters:   08/10/23 165.1 cm (65\")     BMI Readings from Last 1 Encounters:   08/10/23 41.20 kg/mý        Initial Weight/Date: 247.6 lbs (Aug 2023)    Past Medical History:   Diagnosis Date    Allergic     Anxiety     Arthritis     Chronic pain disorder     TWO BACK SURGERY'S    H/O streptococcal infection     Urinary tract infection       Past Surgical History:   Procedure Laterality Date     SECTION  2003    SPINE SURGERY      Lumbar Laminectomy (12), Lumbar Disc Replacement (2016)      Current Outpatient Medications   Medication Sig Dispense Refill    ACETAMINOPHEN PO Take  by mouth As Needed (last taken at 10 p.m. on last night).      albuterol (PROVENTIL HFA;VENTOLIN HFA) 108 (90 Base) MCG/ACT inhaler Inhale 2 puffs Every 4 (Four) Hours As Needed for Wheezing or Shortness of Air. 1 inhaler 0    calcium citrate-vitamin d (CITRACAL) 200-250 MG-UNIT tablet tablet Take  by mouth Daily.      ibuprofen (ADVIL,MOTRIN) 800 MG tablet Take 1 tablet by mouth Every 6 (Six) Hours As Needed for Mild Pain.      multivitamin with minerals tablet tablet Take 1 tablet by mouth Daily.      simvastatin (ZOCOR) 10 MG tablet Take 1 tablet by mouth Every Night.      vitamin D (ERGOCALCIFEROL) 1.25 MG (33430 UT) capsule capsule Take 1 capsule by mouth 1 (One) Time Per Week.       No current facility-administered medications for this visit.      Allergies   Allergen Reactions    Mangifera Indica Fruit Ext ([place]) [Mangifera Indica] Anaphylaxis    Mushroom Nausea And Vomiting          Nutrition " Intervention  Nutrition education and nutrition coaching for behavior change provided.  Strategies used included Comprehensive education & skill development for measuring portions, reading food labels, calculating protein, meal planning, understanding appropriate drink choices, and evaluating condiments, seasonings, and cooking methods.   Review of medical weight loss prescription plan and reviewed nutritional needs for Preoperative Bariatric Surgery, Visit 1 .  Self-monitoring strategies such as keeping a food journal (on paper or electronically) were discussed.  Recommended increasing physical activity, beyond normal daily habits, gradually working to reach ~30 minutes daily.     Recommended Diet Changes- Green Prescription Meal Plan. Provided Sample Menus  Eat 4 meals per day with protein and vegetables at each meal, no carbs after meal 2., Protein goal: 65 gms., Eat vegetables first at each meal., Discussed protein guidelines for shakes and bars., Reduce snacking -use foods from free foods list only., Reduce fat, sugar, and/or salt in food choices., Choose more nutrient dense foods., Choose foods with increased fiber., Monitor portion sizes using a food scale and/or measuring cup., Eliminate soda and sugar-sweetened beverages, and Increase fluid intake to 64 ounces per day       Monitoring/Evaluation Plan  Anticipate follow up in one month. Continue collaboration of care with physician and treatment team.     Electronically signed by  Mckenzie Walsh RDN, LD  08/10/2023 11:29 CDT.

## 2023-08-10 NOTE — PROGRESS NOTES
August 10, 2023    Hello, may I speak with Lottie Plunkett?    My name is Aguila Lopez    I am  with [facility]  [address], [address]  [place] [address] [phone].    Before we get started may I verify your date of birth? 1985    I am calling to officially welcome you to our practice and ask about your recent visit. Is this a good time to talk? yes    Tell me about your visit with us. What things went well?  Went well, I was here last year.  Ready to get started.       We're always looking for ways to make our patients' experiences even better. Do you have recommendations on ways we may improve?  no    Overall were you satisfied with your first visit to our practice? yes       I appreciate you taking the time to speak with me today. Is there anything else I can do for you? no      Thank you, and have a great day.

## 2023-08-11 ENCOUNTER — TELEPHONE (OUTPATIENT)
Dept: PRIMARY CARE CLINIC | Age: 38
End: 2023-08-11

## 2023-08-11 NOTE — TELEPHONE ENCOUNTER
Left message for patient to call office to let me know what provider she wants to see for Chiropractor and Bariatrics.

## 2023-08-13 ASSESSMENT — ENCOUNTER SYMPTOMS
DIARRHEA: 0
ABDOMINAL PAIN: 0
SHORTNESS OF BREATH: 0
RESPIRATORY NEGATIVE: 1
RECTAL PAIN: 0
GASTROINTESTINAL NEGATIVE: 1
CHEST TIGHTNESS: 0
BACK PAIN: 1
NAUSEA: 0
CONSTIPATION: 0

## 2023-08-14 LAB — GLUCOSE BLDC GLUCOMTR-MCNC: 119 MG/DL (ref 70–130)

## 2023-08-16 ENCOUNTER — HOSPITAL ENCOUNTER (OUTPATIENT)
Dept: ULTRASOUND IMAGING | Age: 38
Discharge: HOME | End: 2023-08-16
Payer: COMMERCIAL

## 2023-08-16 DIAGNOSIS — N92.6 IRREGULAR BLEEDING: ICD-10-CM

## 2023-08-16 PROCEDURE — 76830 TRANSVAGINAL US NON-OB: CPT

## 2023-08-18 ENCOUNTER — OFFICE VISIT (OUTPATIENT)
Dept: OBGYN CLINIC | Age: 38
End: 2023-08-18

## 2023-08-18 VITALS
SYSTOLIC BLOOD PRESSURE: 134 MMHG | BODY MASS INDEX: 40.97 KG/M2 | HEART RATE: 76 BPM | DIASTOLIC BLOOD PRESSURE: 84 MMHG | HEIGHT: 64 IN | WEIGHT: 240 LBS

## 2023-08-18 DIAGNOSIS — N92.0 MENORRHAGIA WITH REGULAR CYCLE: ICD-10-CM

## 2023-08-18 DIAGNOSIS — Z98.890 HISTORY OF LOOP ELECTRICAL EXCISION PROCEDURE (LEEP): ICD-10-CM

## 2023-08-18 DIAGNOSIS — F43.21 GRIEF: ICD-10-CM

## 2023-08-18 DIAGNOSIS — Z11.51 ENCOUNTER FOR SCREENING FOR HUMAN PAPILLOMAVIRUS (HPV): ICD-10-CM

## 2023-08-18 DIAGNOSIS — Z12.4 CERVICAL CANCER SCREENING: ICD-10-CM

## 2023-08-18 DIAGNOSIS — Z01.419 ENCOUNTER FOR WELL WOMAN EXAM WITH ROUTINE GYNECOLOGICAL EXAM: Primary | ICD-10-CM

## 2023-08-18 DIAGNOSIS — N94.6 DYSMENORRHEA: ICD-10-CM

## 2023-08-18 DIAGNOSIS — Z12.39 SCREENING BREAST EXAMINATION: ICD-10-CM

## 2023-08-18 DIAGNOSIS — Z72.820 POOR SLEEP: ICD-10-CM

## 2023-08-18 LAB — HPV16+18+H RISK 12 DNA SPEC-IMP: NORMAL

## 2023-08-18 RX ORDER — HYDROXYZINE PAMOATE 25 MG/1
25 CAPSULE ORAL NIGHTLY
Qty: 30 CAPSULE | Refills: 0 | Status: SHIPPED | OUTPATIENT
Start: 2023-08-18 | End: 2023-09-17

## 2023-08-18 RX ORDER — BUSPIRONE HYDROCHLORIDE 15 MG/1
15 TABLET ORAL 3 TIMES DAILY PRN
Qty: 90 TABLET | Refills: 0 | Status: SHIPPED | OUTPATIENT
Start: 2023-08-18 | End: 2023-09-17

## 2023-08-18 ASSESSMENT — ENCOUNTER SYMPTOMS
GASTROINTESTINAL NEGATIVE: 1
NAUSEA: 0
RESPIRATORY NEGATIVE: 1
BACK PAIN: 0
ABDOMINAL PAIN: 0
DIARRHEA: 0
SHORTNESS OF BREATH: 0
CONSTIPATION: 0
RECTAL PAIN: 0
CHEST TIGHTNESS: 0

## 2023-08-18 NOTE — PROGRESS NOTES
Pt presents today for pap smear and breast exam.  She also complains of     Last mammogram:    Last pap smear:  2018  Contraception:  none  :  2  Para:  1  AB:  1  Last bone density:  none  Last colonoscopy:   none
Uterus was sounded to 7cm. Pipelle endometrial aspirator was inserted and gentle pressure applied. Adequate tissue sample obtained. Sent for pathology. Pt tolerated well. Condition:  Stable    Complications:  None    Plan:    The patient was advised to call for any fever or for prolonged or severe pain or bleeding. She was advised to use NSAID as needed for mild to moderate pain. She was advised to avoid vaginal intercourse for 48 hours or until the bleeding has completely stopped.

## 2023-08-22 ENCOUNTER — TELEPHONE (OUTPATIENT)
Dept: OBGYN CLINIC | Age: 38
End: 2023-08-22

## 2023-08-22 NOTE — TELEPHONE ENCOUNTER
----- Message from Glynda Apgar, APRN - CNP sent at 2023  1:09 PM CDT -----  Patient desires surgical consult for endometrial ablation

## 2023-08-24 LAB
HPV HR 12 DNA SPEC QL NAA+PROBE: NOT DETECTED
HPV16 DNA SPEC QL NAA+PROBE: NOT DETECTED
HPV16+18+H RISK 12 DNA SPEC-IMP: NORMAL
HPV18 DNA SPEC QL NAA+PROBE: NOT DETECTED

## 2023-09-12 DIAGNOSIS — Z72.820 POOR SLEEP: ICD-10-CM

## 2023-09-12 RX ORDER — HYDROXYZINE PAMOATE 25 MG/1
25 CAPSULE ORAL
Qty: 30 CAPSULE | Refills: 0 | Status: SHIPPED | OUTPATIENT
Start: 2023-09-12

## 2023-09-29 ENCOUNTER — NUTRITION (OUTPATIENT)
Dept: BARIATRICS/WEIGHT MGMT | Facility: CLINIC | Age: 38
End: 2023-09-29
Payer: COMMERCIAL

## 2023-09-29 ENCOUNTER — OFFICE VISIT (OUTPATIENT)
Dept: BARIATRICS/WEIGHT MGMT | Facility: CLINIC | Age: 38
End: 2023-09-29
Payer: COMMERCIAL

## 2023-09-29 VITALS
DIASTOLIC BLOOD PRESSURE: 85 MMHG | SYSTOLIC BLOOD PRESSURE: 136 MMHG | WEIGHT: 249.4 LBS | HEART RATE: 83 BPM | OXYGEN SATURATION: 98 % | TEMPERATURE: 98.6 F | HEIGHT: 65 IN | BODY MASS INDEX: 41.55 KG/M2

## 2023-09-29 DIAGNOSIS — R06.83 SNORING: ICD-10-CM

## 2023-09-29 DIAGNOSIS — E55.9 VITAMIN D DEFICIENCY: ICD-10-CM

## 2023-09-29 DIAGNOSIS — E66.01 CLASS 3 SEVERE OBESITY DUE TO EXCESS CALORIES WITH SERIOUS COMORBIDITY AND BODY MASS INDEX (BMI) OF 40.0 TO 44.9 IN ADULT: Primary | ICD-10-CM

## 2023-09-29 DIAGNOSIS — E78.2 MIXED HYPERLIPIDEMIA: ICD-10-CM

## 2023-09-29 DIAGNOSIS — G47.10 HYPERSOMNIA: ICD-10-CM

## 2023-09-29 DIAGNOSIS — Z72.0 NICOTINE ABUSE: ICD-10-CM

## 2023-09-29 DIAGNOSIS — Z13.21 SCREENING FOR MALNUTRITION: ICD-10-CM

## 2023-09-29 NOTE — PROGRESS NOTES
"Metabolic and Bariatric Surgery Adult Nutrition Assessment    Patient Name: Lottie Plunkett   YOB: 1985   MRN: [id number]     Assessment Date:  2023     Reason for Visit: Follow-up Nutrition Assessment     Treatment Pathway: Preoperative Bariatric Surgery, Visit 2    Assessment    Anthropometrics   Wt Readings from Last 1 Encounters:   23 113 kg (249 lb 6.4 oz)     Ht Readings from Last 1 Encounters:   23 165.1 cm (65\")     BMI Readings from Last 1 Encounters:   23 41.50 kg/m²        Initial Weight/Date: 247.6 lbs  Weight Changes since last visit: +1.8 lbs  Net Weight Change: +1.8 lbs    Past Medical History:   Diagnosis Date    Allergic     Anxiety     Arthritis     Chronic pain disorder     TWO BACK SURGERY'S    H/O streptococcal infection     Urinary tract infection       Past Surgical History:   Procedure Laterality Date     SECTION  2003    SPINE SURGERY      Lumbar Laminectomy (13), Lumbar Disc Replacement (2016)      Current Outpatient Medications   Medication Sig Dispense Refill    ACETAMINOPHEN PO Take  by mouth As Needed (last taken at 10 p.m. on last night).      albuterol (PROVENTIL HFA;VENTOLIN HFA) 108 (90 Base) MCG/ACT inhaler Inhale 2 puffs Every 4 (Four) Hours As Needed for Wheezing or Shortness of Air. 1 inhaler 0    Ascorbic Acid (VITAMIN C PO) Take  by mouth.      B Complex Vitamins (VITAMIN B COMPLEX PO) Take  by mouth.      BIOTIN PO Take  by mouth.      calcium citrate-vitamin d (CITRACAL) 200-250 MG-UNIT tablet tablet Take  by mouth Daily.      ibuprofen (ADVIL,MOTRIN) 800 MG tablet Take 1 tablet by mouth Every 6 (Six) Hours As Needed for Mild Pain.      Lactobacillus (DIGESTIVE HEALTH PROBIOTIC PO) Take  by mouth.      multivitamin with minerals tablet tablet Take 1 tablet by mouth Daily.      simvastatin (ZOCOR) 10 MG tablet Take 1 tablet by mouth Every Night.      vitamin D (ERGOCALCIFEROL) 1.25 MG (80874 UT) capsule capsule " "Take 1 capsule by mouth 1 (One) Time Per Week.       No current facility-administered medications for this visit.      Allergies   Allergen Reactions    Mangifera Indica Fruit Ext (Shar) [Mangifera Indica] Anaphylaxis    Mushroom Nausea And Vomiting          Pertinent Social/Behavior/Environmental History: currently traveling a lot for job currently- flying and is limited on how much she can pack. Must stay in certain hotels which may/may not provide adequate meal 1 options. Most Meal 2 options are provided by the workplace and meal 3/4 are eaten out with company.     Nutrition Recall  Eating 1-4 meals daily. At home all 4. On traveling jobs- 1-3.   Reviewed food recall.   Snacking - limited  Monitoring portions- when at home.   Calculating Protein- not currently.   Drinking sugary/carbonated beverages- none  Fluid Intake- 64+ oz coffee and water. Unsweet tea a few times.     Success this Month: Does meal prep well when at home. Eliminated sparking water & beer.   Barriers: traveling for work.     Exercise: walking through airports when traveling, none outside of that.   Recommended increasing physical activity, beyond normal daily habits, gradually working to reach ~30 minutes daily.     Nutrition Intervention  Nutrition education for morbid obesity and nutrition coaching for behavior change provided.  Strategies used included Comprehensive education, Motivational Interviewing , Problem Solving, Skill Development for meal planning while on the go, being self advocate for food choices at catered events- I.e. advocating for her food allergies and requesting accommodations such as \"having a salad option\" during catered meals, as well as how to choose foods when eating out at restaurants , and Ongoing reinforcement  Review of medical weight loss prescription 4 meal/day plan and reviewed nutritional needs for Preoperative Bariatric Surgery, Visit 2 .  Self-monitoring strategies such as keeping a food journal (on paper " "or electronically) and calculating fluid/protein intake were discussed.    Recommended Diet Changes- Green Prescription Meal Plan  Eat 4 meals per day with protein and vegetables at each meal, no carbs after meal 2., Protein goal: 65 gms., Eat vegetables first at each meal., Discussed protein guidelines for shakes and bars., Reduce snacking -use foods from free foods list only., Reduce fat, sugar, and/or salt in food choices., Choose more nutrient dense foods., Choose foods with increased fiber., Monitor portion sizes using a food scale and/or measuring cup., Eliminate soda and sugar-sweetened beverages, and Increase fluid intake to 64 ounces per day      Goals  1. Coolers when traveling- protein shakes/protein powder, etc. Encouraged her to look into TSA requirements/recommendations for a \"medical device bag\"  2. Put health first & be an advocate for her health. I.e. Ask for salad option.     Monitoring/Evaluation Plan  Anticipate follow in 1 months. Continue collaboration of care with physician and treatment team.     Time Spent 20 minutes.    Electronically signed by  Mckenzie Walsh RDN, LD  09/29/2023 10:09 CDT.  "

## 2023-09-29 NOTE — PROGRESS NOTES
"Patient Care Team:  Shaunna Choudhury APRN as PCP - General (Otolaryngology)    Reason for Visit:  Surgical Weight loss, V2     Subjective   Lottie Plunkett is a 38 y.o. female.     Lottie is here for follow-up and continued medical management of her morbid obesity.  She is currently on a prescription diet.  Lottie previously was to apply dietary changes such as following the meal plan as directed.  Patient admits to eating around 4 meals per day when at home- struggles with traveling.  She  admits to drinking at least 64 ounces or more of fluid each day when home but struggles with intake when traveling.  She  is currently exercising none..  She  states that she has gone without soda intake and reports nicotine use. She states this is much improved and she is smoking less  Patient has gained 2  pounds since her last appointment with us.     Review Of Systems:  Review of Systems   Constitutional: Negative.    Respiratory: Negative.     Cardiovascular: Negative.    Gastrointestinal: Negative.    Endocrine: Negative.    Musculoskeletal: Negative.    Neurological:  Positive for numbness.   Psychiatric/Behavioral: Negative.         The following portions of the patient's history were reviewed and updated as appropriate: allergies, current medications, past family history, past medical history, past social history, past surgical history, and problem list.    Objective   /85 (BP Location: Right arm, Patient Position: Sitting, Cuff Size: Adult)   Pulse 83   Temp 98.6 °F (37 °C)   Ht 165.1 cm (65\")   Wt 113 kg (249 lb 6.4 oz)   SpO2 98%   BMI 41.50 kg/m²       09/29/23  0923   Weight: 113 kg (249 lb 6.4 oz)            Physical Exam  Vitals reviewed.   Constitutional:       Appearance: She is obese.   Cardiovascular:      Rate and Rhythm: Normal rate and regular rhythm.   Pulmonary:      Effort: Pulmonary effort is normal.   Abdominal:      General: Bowel sounds are normal.      Palpations: Abdomen is soft. "   Musculoskeletal:         General: Normal range of motion.   Skin:     General: Skin is warm and dry.   Neurological:      Mental Status: She is alert and oriented to person, place, and time.   Psychiatric:         Mood and Affect: Mood normal.         Behavior: Behavior normal.          Assessment & Plan   Diagnoses and all orders for this visit:    1. Class 3 severe obesity due to excess calories with serious comorbidity and body mass index (BMI) of 40.0 to 44.9 in adult (Primary)  Assessment & Plan:  Patient's (Body mass index is 41.5 kg/m².) indicates that they are morbidly/severely obese (BMI > 40 or > 35 with obesity - related health condition) with health conditions that include dyslipidemias . Weight is improving with treatment. BMI  is above average; BMI management plan is completed. We discussed portion control and increasing exercise.     Orders:  -     CBC & Differential; Future  -     Comprehensive Metabolic Panel; Future  -     Zinc; Future  -     Folate; Future  -     Vitamin A & E; Future  -     Vitamin B12; Future  -     Vitamin D,25-Hydroxy; Future  -     Vitamin B6; Future  -     Copper, Serum; Future  -     Iron; Future  -     Vitamin B1; Future  -     ECG 12 Lead; Future  -     Ambulatory Referral to Psychiatry  -     Home Sleep Study; Future  -     TSH; Future  -     Hemoglobin A1c; Future    2. Nicotine abuse  -     Nicotine & Metabolite, Quant; Future    3. Mixed hyperlipidemia  Assessment & Plan:  Lipid abnormalities are unchanged.  Nutritional counseling was provided. and The patient was referred to the dietician.  Lipids will be reassessed  with PCP .    Orders:  -     CBC & Differential; Future  -     Comprehensive Metabolic Panel; Future  -     Zinc; Future  -     Folate; Future  -     Vitamin A & E; Future  -     Vitamin B12; Future  -     Vitamin D,25-Hydroxy; Future  -     Vitamin B6; Future  -     Copper, Serum; Future  -     Iron; Future  -     Vitamin B1; Future  -     ECG 12  Lead; Future  -     Ambulatory Referral to Psychiatry  -     Home Sleep Study; Future  -     TSH; Future  -     Hemoglobin A1c; Future    4. Screening for malnutrition  -     CBC & Differential; Future  -     Comprehensive Metabolic Panel; Future  -     Zinc; Future  -     Folate; Future  -     Vitamin A & E; Future  -     Vitamin B12; Future  -     Vitamin D,25-Hydroxy; Future  -     Vitamin B6; Future  -     Copper, Serum; Future  -     Iron; Future  -     Vitamin B1; Future  -     ECG 12 Lead; Future  -     Ambulatory Referral to Psychiatry  -     Home Sleep Study; Future  -     TSH; Future  -     Hemoglobin A1c; Future    5. Vitamin D deficiency  -     Vitamin D,25-Hydroxy; Future    6. Snoring  -     Home Sleep Study; Future    7. Hypersomnia  -     Home Sleep Study; Future     Lottie Plunkett  reports that she quit smoking about 17 months ago. Her smoking use included cigarettes. She has a 2.75 pack-year smoking history. She has never used smokeless tobacco.. I have educated her on the risk of diseases from using tobacco products such as cancer, COPD, and heart disease.     I advised her to quit and she is willing to quit. We have discussed the following method/s for tobacco cessation:  Education Material Counseling.  Together we have set a quit date for 1 month from today.  She will follow up with me in 1 month or sooner to check on her progress.    I spent 4 minutes counseling the patient.          Lottie Plunkett was seen today for follow-up, obesity, nutrition counseling and weight loss.    Today we discussed healthy changes in lifestyle, diet, and exercise. Dietician consultation obtained.  Lottie Plunkett had received handouts to her explaining the recommendation on portion sizes/appetite control/reading nutrition labels.   Intensive behavioral therapy for obesity was done today as well.     Goals for this month are:   Work on following the meal plan while traveling   Full smoking cessation   Discussed  pre operative workup  Utilize psychiatry and [id number] to assist with smoking cessation     Follow up in 1 month for a weight recheck.    Patient will continue GREEN meal plan. Will work with dietitian to assist with planning when traveling

## 2023-09-29 NOTE — ASSESSMENT & PLAN NOTE
Patient's (Body mass index is 41.5 kg/m².) indicates that they are morbidly/severely obese (BMI > 40 or > 35 with obesity - related health condition) with health conditions that include dyslipidemias . Weight is improving with treatment. BMI  is above average; BMI management plan is completed. We discussed portion control and increasing exercise.

## 2023-10-20 ASSESSMENT — ENCOUNTER SYMPTOMS
RESPIRATORY NEGATIVE: 1
EYES NEGATIVE: 1
GASTROINTESTINAL NEGATIVE: 1

## 2023-10-20 NOTE — PROGRESS NOTES
Pt is here today for a pre op visit for hysteroscopy, endometrial ablation w/geo d&c 10/27/23
liquor per week     Comment: occ    Drug use: No    Sexual activity: Not Currently   Other Topics Concern    Not on file   Social History Narrative    Not on file     Social Determinants of Health     Financial Resource Strain: Low Risk  (7/17/2023)    Overall Financial Resource Strain (CARDIA)     Difficulty of Paying Living Expenses: Not very hard   Food Insecurity: Food Insecurity Present (7/17/2023)    Hunger Vital Sign     Worried About Running Out of Food in the Last Year: Sometimes true     Ran Out of Food in the Last Year: Never true   Transportation Needs: Unknown (7/17/2023)    PRAPARE - Transportation     Lack of Transportation (Medical): Not on file     Lack of Transportation (Non-Medical): No   Physical Activity: Not on file   Stress: Not on file   Social Connections: Not on file   Intimate Partner Violence: Not on file   Housing Stability: Unknown (7/17/2023)    Housing Stability Vital Sign     Unable to Pay for Housing in the Last Year: Not on file     Number of Places Lived in the Last Year: Not on file     Unstable Housing in the Last Year: No         Current Outpatient Medications:     traMADol (ULTRAM) 50 MG tablet, Take 1 tablet by mouth every 6 hours as needed for Pain., Disp: , Rfl:     gabapentin (NEURONTIN) 300 MG capsule, Take 1 capsule by mouth nightly as needed.  Patient takes 1 tablet at night before bedtime, Disp: , Rfl:     ascorbic acid (V-R VITAMIN C) 250 MG tablet, Take by mouth, Disp: , Rfl:     BIOTIN PO, Take 1 each by mouth daily, Disp: , Rfl:     B Complex-C (B-COMPLEX WITH VITAMIN C) tablet, , Disp: , Rfl:     calcium citrate-vitamin D ([employer]S CALCIUM CITRATE+D3 PETITES) 200-6.25 MG-MCG TABS per tablet, Take by mouth daily, Disp: , Rfl:     Multiple Vitamins-Minerals (MULTIVITAMIN GUMMIES ADULT) CHEW, , Disp: , Rfl:     hydrOXYzine pamoate (VISTARIL) 25 MG capsule, TAKE 1 CAPSULE BY MOUTH EVERY NIGHT (Patient taking differently: Take 1 capsule by mouth nightly as needed), Disp: 30

## 2023-10-23 ENCOUNTER — OFFICE VISIT (OUTPATIENT)
Dept: OBGYN CLINIC | Age: 38
End: 2023-10-23
Payer: COMMERCIAL

## 2023-10-23 ENCOUNTER — HOSPITAL ENCOUNTER (OUTPATIENT)
Dept: PREADMISSION TESTING | Age: 38
Discharge: HOME | End: 2023-10-27
Payer: COMMERCIAL

## 2023-10-23 VITALS
DIASTOLIC BLOOD PRESSURE: 87 MMHG | SYSTOLIC BLOOD PRESSURE: 135 MMHG | WEIGHT: 248 LBS | BODY MASS INDEX: 41.32 KG/M2 | HEIGHT: 65 IN | HEART RATE: 72 BPM

## 2023-10-23 DIAGNOSIS — N94.6 DYSMENORRHEA: ICD-10-CM

## 2023-10-23 DIAGNOSIS — N92.0 MENORRHAGIA WITH REGULAR CYCLE: Primary | ICD-10-CM

## 2023-10-23 LAB
BASOPHILS # BLD: 0.1 K/UL (ref 0–0.2)
BASOPHILS NFR BLD: 0.7 % (ref 0–1)
EOSINOPHIL # BLD: 0.2 K/UL (ref 0–0.6)
EOSINOPHIL NFR BLD: 1.8 % (ref 0–5)
ERYTHROCYTE [DISTWIDTH] IN BLOOD BY AUTOMATED COUNT: 13.6 % (ref 11.5–14.5)
HCT VFR BLD AUTO: 38.5 % (ref 37–47)
HGB BLD-MCNC: 12.6 G/DL (ref 12–16)
IMM GRANULOCYTES # BLD: 0 K/UL
LYMPHOCYTES # BLD: 2.8 K/UL (ref 1.1–4.5)
LYMPHOCYTES NFR BLD: 33.7 % (ref 20–40)
MCH RBC QN AUTO: 28.7 PG (ref 27–31)
MCHC RBC AUTO-ENTMCNC: 32.7 G/DL (ref 33–37)
MCV RBC AUTO: 87.7 FL (ref 81–99)
MONOCYTES # BLD: 0.6 K/UL (ref 0–0.9)
MONOCYTES NFR BLD: 7.5 % (ref 0–10)
NEUTROPHILS # BLD: 4.6 K/UL (ref 1.5–7.5)
NEUTS SEG NFR BLD: 56.1 % (ref 50–65)
PLATELET # BLD AUTO: 348 K/UL (ref 130–400)
PMV BLD AUTO: 9.6 FL (ref 9.4–12.3)
RBC # BLD AUTO: 4.39 M/UL (ref 4.2–5.4)
WBC # BLD AUTO: 8.2 K/UL (ref 4.8–10.8)

## 2023-10-23 PROCEDURE — 99214 OFFICE O/P EST MOD 30 MIN: CPT | Performed by: OBSTETRICS & GYNECOLOGY

## 2023-10-23 PROCEDURE — 85025 COMPLETE CBC W/AUTO DIFF WBC: CPT

## 2023-10-23 RX ORDER — TRAMADOL HYDROCHLORIDE 50 MG/1
50 TABLET ORAL EVERY 6 HOURS PRN
COMMUNITY

## 2023-10-23 RX ORDER — ACETAMINOPHEN 500 MG
TABLET ORAL DAILY
COMMUNITY

## 2023-10-23 RX ORDER — GABAPENTIN 300 MG/1
300 CAPSULE ORAL NIGHTLY PRN
COMMUNITY

## 2023-10-23 RX ORDER — MULTIVITAMIN WITH IRON
TABLET ORAL
COMMUNITY
Start: 2023-08-08

## 2023-10-23 RX ORDER — ASPIRIN 325 MG
TABLET ORAL
COMMUNITY
Start: 2023-08-08

## 2023-10-23 RX ORDER — MISOPROSTOL 200 UG/1
200 TABLET ORAL ONCE
Qty: 1 TABLET | Refills: 0 | Status: ON HOLD | OUTPATIENT
Start: 2023-10-23 | End: 2023-10-27 | Stop reason: HOSPADM

## 2023-10-23 RX ORDER — ASCORBIC ACID 250 MG
TABLET ORAL
COMMUNITY

## 2023-10-23 NOTE — DISCHARGE INSTRUCTIONS
The day before surgery you will receive a phone call from the surgery nurse to let you know what time to arrive on the day of surgery. This call will usually be between 2-4 PM. If you do not receive a phone call by 4 PM the day before your surgery please call [phone] and let them know you have not received an arrival time. If your surgery is on Monday, your call will be on the Friday before your Monday surgery. The morning of surgery, you may take all your prescribed medications with a sip of water. Any exceptions to this would be listed below:    Chlorhexidine Gluconate 4% Solution    Patient should shower with this soap  the night before surgery and the morning of surgery) washing from the neck down (avoiding contact with genitalia). DO  [facility] [address] YOUR HAIR OR FACE WITH THIS SOAP. When washing with this soap, apply enough to suds up the body thoroughly, turn the water away from your body and allow the soap suds to remain on the body for 2 full minutes, then rinse body completely. After using this soap on the body, please do not apply powders or lotions to your body. PREOPERATIVE GUIDELINES WHEN RECEIVING ANESTHESIA    Do not eat or drink anything after midnight, the night before your surgery. No gum or candy the morning of surgery. This is extremely important for your safety. Take a bath (or shower) the night before your surgery and you may brush your teeth the morning of your surgery. You will be scheduled to arrive at the hospital 2 hours before your surgery, or follow your surgeon's instructions. Dress comfortably. Wear loose clothing that will be easy to remove and comfortable for your trip home. You may wear eyeglasses or contacts but bring your cases with you as they must be remove before your surgery. Hearing aids and dentures will need to be removed before your surgery. Do not wear any jewelry, including body jewelry.   All jewelry will need to be removed prior to your

## 2023-10-24 RX ORDER — ERGOCALCIFEROL 1.25 MG/1
50000 CAPSULE ORAL WEEKLY
Qty: 12 CAPSULE | Refills: 1 | Status: SHIPPED | OUTPATIENT
Start: 2023-10-24

## 2023-10-24 RX ORDER — IBUPROFEN 800 MG/1
800 TABLET ORAL 2 TIMES DAILY PRN
Qty: 60 TABLET | Refills: 0 | Status: SHIPPED | OUTPATIENT
Start: 2023-10-24

## 2023-10-26 ENCOUNTER — TELEPHONE (OUTPATIENT)
Dept: OBGYN CLINIC | Age: 38
End: 2023-10-26

## 2023-10-26 NOTE — TELEPHONE ENCOUNTER
Pt. Is returning a call to office. Pt. Available until about 11 and in meetings until 5. Pt. Said message could be left or [employer] message sent.

## 2023-10-27 ENCOUNTER — ANESTHESIA (OUTPATIENT)
Dept: OPERATING ROOM | Age: 38
End: 2023-10-27
Payer: COMMERCIAL

## 2023-10-27 ENCOUNTER — HOSPITAL ENCOUNTER (OUTPATIENT)
Age: 38
Setting detail: OUTPATIENT SURGERY
Discharge: HOME | End: 2023-10-27
Attending: OBSTETRICS & GYNECOLOGY | Admitting: OBSTETRICS & GYNECOLOGY
Payer: COMMERCIAL

## 2023-10-27 ENCOUNTER — ANESTHESIA EVENT (OUTPATIENT)
Dept: OPERATING ROOM | Age: 38
End: 2023-10-27
Payer: COMMERCIAL

## 2023-10-27 VITALS
WEIGHT: 250 LBS | HEIGHT: 65 IN | TEMPERATURE: 96.9 F | OXYGEN SATURATION: 98 % | SYSTOLIC BLOOD PRESSURE: 134 MMHG | DIASTOLIC BLOOD PRESSURE: 83 MMHG | RESPIRATION RATE: 16 BRPM | BODY MASS INDEX: 41.65 KG/M2 | HEART RATE: 69 BPM

## 2023-10-27 DIAGNOSIS — Z98.890 S/P ENDOMETRIAL ABLATION: Primary | ICD-10-CM

## 2023-10-27 DIAGNOSIS — N94.6 DYSMENORRHEA: ICD-10-CM

## 2023-10-27 DIAGNOSIS — N92.0 MENORRHAGIA WITH REGULAR CYCLE: ICD-10-CM

## 2023-10-27 LAB — HCG UR QL: NEGATIVE

## 2023-10-27 PROCEDURE — 2500000003 HC RX 250 WO HCPCS: Performed by: ANESTHESIOLOGY

## 2023-10-27 PROCEDURE — 7100000011 HC PHASE II RECOVERY - ADDTL 15 MIN: Performed by: OBSTETRICS & GYNECOLOGY

## 2023-10-27 PROCEDURE — 6360000002 HC RX W HCPCS: Performed by: NURSE ANESTHETIST, CERTIFIED REGISTERED

## 2023-10-27 PROCEDURE — 3600000004 HC SURGERY LEVEL 4 BASE: Performed by: OBSTETRICS & GYNECOLOGY

## 2023-10-27 PROCEDURE — 2580000003 HC RX 258: Performed by: ANESTHESIOLOGY

## 2023-10-27 PROCEDURE — 3700000000 HC ANESTHESIA ATTENDED CARE: Performed by: OBSTETRICS & GYNECOLOGY

## 2023-10-27 PROCEDURE — 7100000000 HC PACU RECOVERY - FIRST 15 MIN: Performed by: OBSTETRICS & GYNECOLOGY

## 2023-10-27 PROCEDURE — 2500000003 HC RX 250 WO HCPCS: Performed by: NURSE ANESTHETIST, CERTIFIED REGISTERED

## 2023-10-27 PROCEDURE — 2720000010 HC SURG SUPPLY STERILE: Performed by: OBSTETRICS & GYNECOLOGY

## 2023-10-27 PROCEDURE — 7100000010 HC PHASE II RECOVERY - FIRST 15 MIN: Performed by: OBSTETRICS & GYNECOLOGY

## 2023-10-27 PROCEDURE — A4216 STERILE WATER/SALINE, 10 ML: HCPCS | Performed by: ANESTHESIOLOGY

## 2023-10-27 PROCEDURE — 2709999900 HC NON-CHARGEABLE SUPPLY: Performed by: OBSTETRICS & GYNECOLOGY

## 2023-10-27 PROCEDURE — 6370000000 HC RX 637 (ALT 250 FOR IP): Performed by: OBSTETRICS & GYNECOLOGY

## 2023-10-27 PROCEDURE — 84703 CHORIONIC GONADOTROPIN ASSAY: CPT

## 2023-10-27 PROCEDURE — 88305 TISSUE EXAM BY PATHOLOGIST: CPT

## 2023-10-27 PROCEDURE — 6370000000 HC RX 637 (ALT 250 FOR IP): Performed by: ANESTHESIOLOGY

## 2023-10-27 PROCEDURE — 7100000001 HC PACU RECOVERY - ADDTL 15 MIN: Performed by: OBSTETRICS & GYNECOLOGY

## 2023-10-27 PROCEDURE — 3700000001 HC ADD 15 MINUTES (ANESTHESIA): Performed by: OBSTETRICS & GYNECOLOGY

## 2023-10-27 PROCEDURE — 3600000014 HC SURGERY LEVEL 4 ADDTL 15MIN: Performed by: OBSTETRICS & GYNECOLOGY

## 2023-10-27 PROCEDURE — 58563 HYSTEROSCOPY ABLATION: CPT | Performed by: OBSTETRICS & GYNECOLOGY

## 2023-10-27 PROCEDURE — 6360000002 HC RX W HCPCS: Performed by: ANESTHESIOLOGY

## 2023-10-27 RX ORDER — SCOLOPAMINE TRANSDERMAL SYSTEM 1 MG/1
1 PATCH, EXTENDED RELEASE TRANSDERMAL
Status: DISCONTINUED | OUTPATIENT
Start: 2023-10-27 | End: 2023-10-27 | Stop reason: HOSPADM

## 2023-10-27 RX ORDER — ONDANSETRON 2 MG/ML
INJECTION INTRAMUSCULAR; INTRAVENOUS PRN
Status: DISCONTINUED | OUTPATIENT
Start: 2023-10-27 | End: 2023-10-27 | Stop reason: SDUPTHER

## 2023-10-27 RX ORDER — LIDOCAINE HYDROCHLORIDE 10 MG/ML
INJECTION, SOLUTION INFILTRATION; PERINEURAL PRN
Status: DISCONTINUED | OUTPATIENT
Start: 2023-10-27 | End: 2023-10-27 | Stop reason: SDUPTHER

## 2023-10-27 RX ORDER — HYDRALAZINE HYDROCHLORIDE 20 MG/ML
10 INJECTION INTRAMUSCULAR; INTRAVENOUS
Status: DISCONTINUED | OUTPATIENT
Start: 2023-10-27 | End: 2023-10-27 | Stop reason: HOSPADM

## 2023-10-27 RX ORDER — HYDROMORPHONE HYDROCHLORIDE 1 MG/ML
0.5 INJECTION, SOLUTION INTRAMUSCULAR; INTRAVENOUS; SUBCUTANEOUS EVERY 5 MIN PRN
Status: DISCONTINUED | OUTPATIENT
Start: 2023-10-27 | End: 2023-10-27 | Stop reason: HOSPADM

## 2023-10-27 RX ORDER — SODIUM CHLORIDE 9 MG/ML
INJECTION, SOLUTION INTRAVENOUS PRN
Status: DISCONTINUED | OUTPATIENT
Start: 2023-10-27 | End: 2023-10-27 | Stop reason: HOSPADM

## 2023-10-27 RX ORDER — ACETAMINOPHEN AND CODEINE PHOSPHATE 300; 30 MG/1; MG/1
1 TABLET ORAL ONCE
Status: COMPLETED | OUTPATIENT
Start: 2023-10-27 | End: 2023-10-27

## 2023-10-27 RX ORDER — SODIUM CHLORIDE 0.9 % (FLUSH) 0.9 %
5-40 SYRINGE (ML) INJECTION EVERY 12 HOURS SCHEDULED
Status: DISCONTINUED | OUTPATIENT
Start: 2023-10-27 | End: 2023-10-27 | Stop reason: HOSPADM

## 2023-10-27 RX ORDER — MEPERIDINE HYDROCHLORIDE 25 MG/ML
12.5 INJECTION INTRAMUSCULAR; INTRAVENOUS; SUBCUTANEOUS
Status: DISCONTINUED | OUTPATIENT
Start: 2023-10-27 | End: 2023-10-27 | Stop reason: HOSPADM

## 2023-10-27 RX ORDER — NALOXONE HYDROCHLORIDE 4 MG/.1ML
1 SPRAY NASAL PRN
Qty: 1 EACH | Refills: 0 | Status: SHIPPED | OUTPATIENT
Start: 2023-10-27

## 2023-10-27 RX ORDER — MIDAZOLAM HYDROCHLORIDE 2 MG/2ML
2 INJECTION, SOLUTION INTRAMUSCULAR; INTRAVENOUS
Status: DISCONTINUED | OUTPATIENT
Start: 2023-10-27 | End: 2023-10-27 | Stop reason: HOSPADM

## 2023-10-27 RX ORDER — HYDROMORPHONE HYDROCHLORIDE 1 MG/ML
0.25 INJECTION, SOLUTION INTRAMUSCULAR; INTRAVENOUS; SUBCUTANEOUS EVERY 5 MIN PRN
Status: DISCONTINUED | OUTPATIENT
Start: 2023-10-27 | End: 2023-10-27 | Stop reason: HOSPADM

## 2023-10-27 RX ORDER — SODIUM CHLORIDE, SODIUM LACTATE, POTASSIUM CHLORIDE, CALCIUM CHLORIDE 600; 310; 30; 20 MG/100ML; MG/100ML; MG/100ML; MG/100ML
INJECTION, SOLUTION INTRAVENOUS CONTINUOUS
Status: DISCONTINUED | OUTPATIENT
Start: 2023-10-27 | End: 2023-10-27 | Stop reason: HOSPADM

## 2023-10-27 RX ORDER — LIDOCAINE HYDROCHLORIDE 10 MG/ML
1 INJECTION, SOLUTION EPIDURAL; INFILTRATION; INTRACAUDAL; PERINEURAL
Status: DISCONTINUED | OUTPATIENT
Start: 2023-10-27 | End: 2023-10-27 | Stop reason: HOSPADM

## 2023-10-27 RX ORDER — DEXAMETHASONE SODIUM PHOSPHATE 10 MG/ML
INJECTION, SOLUTION INTRAMUSCULAR; INTRAVENOUS PRN
Status: DISCONTINUED | OUTPATIENT
Start: 2023-10-27 | End: 2023-10-27 | Stop reason: SDUPTHER

## 2023-10-27 RX ORDER — IBUPROFEN 800 MG/1
800 TABLET ORAL EVERY 8 HOURS PRN
Qty: 90 TABLET | Refills: 0 | Status: SHIPPED | OUTPATIENT
Start: 2023-10-27

## 2023-10-27 RX ORDER — IPRATROPIUM BROMIDE AND ALBUTEROL SULFATE 2.5; .5 MG/3ML; MG/3ML
1 SOLUTION RESPIRATORY (INHALATION)
Status: DISCONTINUED | OUTPATIENT
Start: 2023-10-27 | End: 2023-10-27 | Stop reason: HOSPADM

## 2023-10-27 RX ORDER — KETOROLAC TROMETHAMINE 30 MG/ML
30 INJECTION, SOLUTION INTRAMUSCULAR; INTRAVENOUS ONCE
Status: COMPLETED | OUTPATIENT
Start: 2023-10-27 | End: 2023-10-27

## 2023-10-27 RX ORDER — FENTANYL CITRATE 50 UG/ML
INJECTION, SOLUTION INTRAMUSCULAR; INTRAVENOUS PRN
Status: DISCONTINUED | OUTPATIENT
Start: 2023-10-27 | End: 2023-10-27 | Stop reason: SDUPTHER

## 2023-10-27 RX ORDER — ACETAMINOPHEN AND CODEINE PHOSPHATE 300; 30 MG/1; MG/1
1 TABLET ORAL EVERY 4 HOURS PRN
Qty: 18 TABLET | Refills: 0 | Status: SHIPPED | OUTPATIENT
Start: 2023-10-27 | End: 2023-10-30

## 2023-10-27 RX ORDER — LABETALOL HYDROCHLORIDE 5 MG/ML
10 INJECTION, SOLUTION INTRAVENOUS
Status: DISCONTINUED | OUTPATIENT
Start: 2023-10-27 | End: 2023-10-27 | Stop reason: HOSPADM

## 2023-10-27 RX ORDER — SODIUM CHLORIDE 0.9 % (FLUSH) 0.9 %
5-40 SYRINGE (ML) INJECTION PRN
Status: DISCONTINUED | OUTPATIENT
Start: 2023-10-27 | End: 2023-10-27 | Stop reason: HOSPADM

## 2023-10-27 RX ORDER — PROCHLORPERAZINE EDISYLATE 5 MG/ML
5 INJECTION INTRAMUSCULAR; INTRAVENOUS
Status: DISCONTINUED | OUTPATIENT
Start: 2023-10-27 | End: 2023-10-27 | Stop reason: HOSPADM

## 2023-10-27 RX ORDER — PROPOFOL 10 MG/ML
INJECTION, EMULSION INTRAVENOUS PRN
Status: DISCONTINUED | OUTPATIENT
Start: 2023-10-27 | End: 2023-10-27 | Stop reason: SDUPTHER

## 2023-10-27 RX ORDER — DIPHENHYDRAMINE HYDROCHLORIDE 50 MG/ML
12.5 INJECTION INTRAMUSCULAR; INTRAVENOUS
Status: DISCONTINUED | OUTPATIENT
Start: 2023-10-27 | End: 2023-10-27 | Stop reason: HOSPADM

## 2023-10-27 RX ADMIN — LIDOCAINE HYDROCHLORIDE 50 MG: 10 INJECTION, SOLUTION INFILTRATION; PERINEURAL at 09:07

## 2023-10-27 RX ADMIN — FAMOTIDINE 20 MG: 10 INJECTION, SOLUTION INTRAVENOUS at 08:36

## 2023-10-27 RX ADMIN — PROPOFOL 150 MG: 10 INJECTION, EMULSION INTRAVENOUS at 09:07

## 2023-10-27 RX ADMIN — ACETAMINOPHEN AND CODEINE PHOSPHATE 1 TABLET: 300; 30 TABLET ORAL at 10:41

## 2023-10-27 RX ADMIN — HYDROMORPHONE HYDROCHLORIDE 0.5 MG: 1 INJECTION, SOLUTION INTRAMUSCULAR; INTRAVENOUS; SUBCUTANEOUS at 10:04

## 2023-10-27 RX ADMIN — FENTANYL CITRATE 50 MCG: 0.05 INJECTION, SOLUTION INTRAMUSCULAR; INTRAVENOUS at 09:15

## 2023-10-27 RX ADMIN — DEXAMETHASONE SODIUM PHOSPHATE 10 MG: 10 INJECTION, SOLUTION INTRAMUSCULAR; INTRAVENOUS at 09:10

## 2023-10-27 RX ADMIN — SODIUM CHLORIDE, POTASSIUM CHLORIDE, SODIUM LACTATE AND CALCIUM CHLORIDE: 600; 310; 30; 20 INJECTION, SOLUTION INTRAVENOUS at 07:56

## 2023-10-27 RX ADMIN — ONDANSETRON 4 MG: 2 INJECTION INTRAMUSCULAR; INTRAVENOUS at 09:10

## 2023-10-27 RX ADMIN — KETOROLAC TROMETHAMINE 30 MG: 30 INJECTION, SOLUTION INTRAMUSCULAR at 08:37

## 2023-10-27 RX ADMIN — FENTANYL CITRATE 50 MCG: 0.05 INJECTION, SOLUTION INTRAMUSCULAR; INTRAVENOUS at 09:07

## 2023-10-27 ASSESSMENT — PAIN SCALES - GENERAL
PAINLEVEL_OUTOF10: 4
PAINLEVEL_OUTOF10: 4
PAINLEVEL_OUTOF10: 7

## 2023-10-27 ASSESSMENT — LIFESTYLE VARIABLES: SMOKING_STATUS: 0

## 2023-10-27 ASSESSMENT — PAIN DESCRIPTION - DESCRIPTORS: DESCRIPTORS: CRAMPING

## 2023-10-27 ASSESSMENT — PAIN DESCRIPTION - ORIENTATION: ORIENTATION: LOWER;MID

## 2023-10-27 ASSESSMENT — ENCOUNTER SYMPTOMS
EYES NEGATIVE: 1
GASTROINTESTINAL NEGATIVE: 1
RESPIRATORY NEGATIVE: 1
ALLERGIC/IMMUNOLOGIC NEGATIVE: 1

## 2023-10-27 ASSESSMENT — PAIN - FUNCTIONAL ASSESSMENT: PAIN_FUNCTIONAL_ASSESSMENT: PREVENTS OR INTERFERES SOME ACTIVE ACTIVITIES AND ADLS

## 2023-10-27 ASSESSMENT — PAIN DESCRIPTION - LOCATION: LOCATION: ABDOMEN

## 2023-10-27 NOTE — PROGRESS NOTES
CLINICAL PHARMACY NOTE: MEDS TO BEDS    Total # of Prescriptions Filled: 2   The following medications were delivered to the patient:  Current Discharge Medication List        START taking these medications    Details   acetaminophen-codeine (TYLENOL/CODEINE #3) 300-30 MG per tablet Take 1 tablet by mouth every 4 hours as needed for Pain for up to 3 days. Intended supply: 3 days. Take lowest dose possible to manage pain Max Daily Amount: 6 tablets  Qty: 18 tablet, Refills: 0    Comments: Reduce doses taken as pain becomes manageable  Associated Diagnoses: S/P endometrial ablation      !! ibuprofen (ADVIL;MOTRIN) 800 MG tablet Take 1 tablet by mouth every 8 hours as needed for Pain  Qty: 90 tablet, Refills: 0      naloxone 4 MG/0.1ML LIQD nasal spray 1 spray by Nasal route as needed for Opioid Reversal  Qty: 1 each, Refills: 0       !! - Potential duplicate medications found. Please discuss with provider. Additional Documentation:    Delivered RX to patient bedside. No copay.

## 2023-10-27 NOTE — ANESTHESIA POSTPROCEDURE EVALUATION
Department of Anesthesiology  Postprocedure Note    Patient: Louann Turcios  MRN: [id number]  YOB: 1985  Date of evaluation: 10/27/2023      Procedure Summary     Date: 10/27/23 Room / Location: [address]    Anesthesia Start: 0900 Anesthesia Stop:     Procedure: HYSTEROSCOPY, ENDOMETRIAL ABLATION WITH Lianna Sample, DILATATION AND CURETTAGE Diagnosis:       Menorrhagia with regular cycle      Dysmenorrhea      (Menorrhagia with regular cycle [N92.0])      (Dysmenorrhea [N94.6])    Surgeons: Aleksandr Reyes MD Responsible Provider: JOSELINE Brandt CRNA    Anesthesia Type: general ASA Status: 3          Anesthesia Type: No value filed. Shima Phase I:      Shima Phase II:        Anesthesia Post Evaluation    Patient location during evaluation: PACU  Post-procedure mental status: sedated, LMA in place.   Airway patency: patent  Nausea & Vomiting: no nausea and no vomiting  Complications: no  Cardiovascular status: blood pressure returned to baseline  Respiratory status: nonlabored ventilation, oral airway, spontaneous ventilation and room air  Hydration status: euvolemic  Pain management: adequate

## 2023-10-27 NOTE — ANESTHESIA PRE PROCEDURE
Department of Anesthesiology  Preprocedure Note       Name:  Shalini Hernandez Samples   Age:  43 y.o.  :  1985                                          MRN:  [id number]         Date:  10/27/2023      Surgeon: Trenton Gonzales):  Joni Gann MD    Procedure: Procedure(s): HYSTEROSCOPY, ENDOMETRIAL ABLATION WITH NOVOSURE, DILATATION AND CURETTAGE, EXAM UNDER ANESTHESIA    Medications prior to admission:   Prior to Admission medications    Medication Sig Start Date End Date Taking? Authorizing Provider   ibuprofen (ADVIL;MOTRIN) 800 MG tablet Take 1 tablet by mouth 2 times daily as needed for Pain 15/69/57   JOSELINE Giraldo   vitamin D (ERGOCALCIFEROL) 1.25 MG (44833 UT) CAPS capsule Take 1 capsule by mouth once a week  Patient taking differently: Take 1 capsule by mouth Twice a Week Monday and    JOSELINE Giraldo   traMADol (ULTRAM) 50 MG tablet Take 1 tablet by mouth every 6 hours as needed for Pain. Cami Landeros MD   gabapentin (NEURONTIN) 300 MG capsule Take 1 capsule by mouth nightly as needed.  Patient takes 1 tablet at night before bedtime    Cami Landeros MD   ascorbic acid (V-R VITAMIN C) 250 MG tablet Take by mouth    Cami Landeros MD   BIOTIN PO Take 1 each by mouth daily    Cami Landeros MD   B Complex-C (B-COMPLEX WITH VITAMIN C) tablet  23   Cami Landeros MD   calcium citrate-vitamin D (CVS CALCIUM CITRATE+D3 PETITES) 200-6.25 MG-MCG TABS per tablet Take by mouth daily    Cami Landeros MD   Multiple Vitamins-Minerals (MULTIVITAMIN GUMMIES ADULT) [place]  23   Cami Landeros MD   miSOPROStol (CYTOTEC) 200 MCG tablet Place 1 tablet vaginally once for 1 dose The night before procedure 10/23/23 10/23/23  Tracy Null MD   hydrOXYzine pamoate (VISTARIL) 25 MG capsule TAKE 1 CAPSULE BY MOUTH EVERY NIGHT  Patient taking differently: Take 1 capsule by mouth nightly as needed 23   JOSELINE Garcia -

## 2023-10-27 NOTE — H&P
MAE Turcios is a 43 y.o. female with h/o AUB who presents for endometrial ablation. Review of Systems   Constitutional: Negative. HENT: Negative. Eyes: Negative. Respiratory: Negative. Cardiovascular: Negative. Gastrointestinal: Negative. Endocrine: Negative. Genitourinary:  Positive for menstrual problem. Musculoskeletal: Negative. Skin: Negative. Allergic/Immunologic: Negative. Neurological: Negative. Hematological: Negative. Psychiatric/Behavioral: Negative. Past Medical History:   Diagnosis Date    Anxiety     Chronic back pain     Hyperlipidemia     OCD (obsessive compulsive disorder)      Past Surgical History:   Procedure Laterality Date     SECTION      LUMBAR DISCECTOMY      LUMBAR LAMINECTOMY       Family History   Problem Relation Age of Onset    Alzheimer's Disease Paternal Grandfather     Lupus Paternal Grandmother     Hypothyroidism Paternal Grandmother     Cerebral Aneurysm Maternal Grandmother     No Known Problems Maternal Grandfather     Cancer Father     Stroke Father     Mental Illness Mother     Hypothyroidism Mother     Lung Cancer Paternal Uncle     Liver Cancer Maternal Aunt      Social History     Tobacco Use    Smoking status: Former     Packs/day: 1.00     Years: 20.00     Additional pack years: 0.00     Total pack years: 20.00     Types: Cigarettes     Quit date: 2022     Years since quittin.8    Smokeless tobacco: Current   Vaping Use    Vaping Use: Former    Quit date: 10/13/2023    Substances: Nicotine, Flavoring    Devices: Disposable   Substance Use Topics    Alcohol use: Yes     Alcohol/week: 8.0 standard drinks of alcohol     Types: 7 Cans of beer, 1 Shots of liquor per week     Comment: occ    Drug use: No     No current facility-administered medications on file prior to encounter.      Current Outpatient Medications on File Prior to Encounter   Medication Sig Dispense Refill    hydrOXYzine pamoate

## 2023-10-27 NOTE — DISCHARGE INSTRUCTIONS
Nothing in the vagina for 2 weeks. No sex, no tampons, no bath, no douche. No heavy lifting or straining. Vaginal bleeding may occur while healing. If like a period or heavier, you are doing too much. Rest with your feet elevated for next 48 hours and it should taper off. Call MD with fever > 101, foul smelling vaginal discharge or any other questions or concerns. Remove your scopalamine patch the day after surgery. Be sure to wash your hands when you touch it and when you remove it. It may dilate your eyes and cause blurry vision. If this happens please do not go to the emergency room as this is a side effect of the medication and will go away in 4-6 hours. Hysteroscopy With Dilation and Curettage: What to Expect at [address]     For a hysteroscopy, your doctor guides a lighted tube through your cervix and into your uterus. This helps the doctor see inside your uterus. For a dilation and curettage (D&C), your doctor uses a curved tool, called a curette, to gently scrape tissue from your uterus. After these procedures, you are likely to have a backache or cramps similar to menstrual cramps. Expect to pass small clots of blood from your vagina for the first few days. You may have light vaginal bleeding for several weeks after the D&C. If the doctor filled your uterus with air, your belly may feel full. You may also have shoulder pain right after the procedure. You will probably be able to go back to most of your normal activities in 1 or 2 days. This care sheet gives you a general idea about how long it will take for you to recover. But each person recovers at a different pace. Follow the steps below to get better as quickly as possible. How can you care for yourself at home? Activity    Rest when you feel tired. You will probably be able to return to work the day after the procedure. But it depends on what was done and the type of work you do.      You may have some light vaginal

## 2023-10-29 ENCOUNTER — LAB (OUTPATIENT)
Dept: LAB | Facility: HOSPITAL | Age: 38
End: 2023-10-29
Payer: COMMERCIAL

## 2023-10-29 DIAGNOSIS — E66.01 CLASS 3 SEVERE OBESITY DUE TO EXCESS CALORIES WITH SERIOUS COMORBIDITY AND BODY MASS INDEX (BMI) OF 40.0 TO 44.9 IN ADULT: ICD-10-CM

## 2023-10-29 DIAGNOSIS — Z13.21 SCREENING FOR MALNUTRITION: ICD-10-CM

## 2023-10-29 DIAGNOSIS — E78.2 MIXED HYPERLIPIDEMIA: ICD-10-CM

## 2023-10-30 ENCOUNTER — LAB (OUTPATIENT)
Dept: LAB | Facility: HOSPITAL | Age: 38
End: 2023-10-30
Payer: COMMERCIAL

## 2023-10-30 DIAGNOSIS — E55.9 VITAMIN D DEFICIENCY: ICD-10-CM

## 2023-10-30 DIAGNOSIS — Z72.0 NICOTINE ABUSE: ICD-10-CM

## 2023-10-30 DIAGNOSIS — E78.2 MIXED HYPERLIPIDEMIA: Primary | ICD-10-CM

## 2023-10-30 DIAGNOSIS — E66.01 CLASS 3 SEVERE OBESITY DUE TO EXCESS CALORIES WITH SERIOUS COMORBIDITY AND BODY MASS INDEX (BMI) OF 40.0 TO 44.9 IN ADULT: ICD-10-CM

## 2023-10-30 DIAGNOSIS — Z13.21 SCREENING FOR MALNUTRITION: ICD-10-CM

## 2023-10-30 LAB
ALBUMIN SERPL-MCNC: 3.9 G/DL (ref 3.5–5.2)
ALBUMIN/GLOB SERPL: 1.3 G/DL
ALP SERPL-CCNC: 66 U/L (ref 39–117)
ALT SERPL W P-5'-P-CCNC: 43 U/L (ref 1–33)
ANION GAP SERPL CALCULATED.3IONS-SCNC: 8 MMOL/L (ref 5–15)
AST SERPL-CCNC: 21 U/L (ref 1–32)
BASOPHILS # BLD AUTO: 0.05 10*3/MM3 (ref 0–0.2)
BASOPHILS NFR BLD AUTO: 0.8 % (ref 0–1.5)
BILIRUB SERPL-MCNC: <0.2 MG/DL (ref 0–1.2)
BUN SERPL-MCNC: 14 MG/DL (ref 6–20)
BUN/CREAT SERPL: 23.3 (ref 7–25)
CALCIUM SPEC-SCNC: 8.9 MG/DL (ref 8.6–10.5)
CHLORIDE SERPL-SCNC: 103 MMOL/L (ref 98–107)
CO2 SERPL-SCNC: 29 MMOL/L (ref 22–29)
CREAT SERPL-MCNC: 0.6 MG/DL (ref 0.57–1)
DEPRECATED RDW RBC AUTO: 43.2 FL (ref 37–54)
EGFRCR SERPLBLD CKD-EPI 2021: 117.3 ML/MIN/1.73
EOSINOPHIL # BLD AUTO: 0.2 10*3/MM3 (ref 0–0.4)
EOSINOPHIL NFR BLD AUTO: 3 % (ref 0.3–6.2)
ERYTHROCYTE [DISTWIDTH] IN BLOOD BY AUTOMATED COUNT: 13.1 % (ref 12.3–15.4)
GLOBULIN UR ELPH-MCNC: 3 GM/DL
GLUCOSE SERPL-MCNC: 88 MG/DL (ref 65–99)
HCT VFR BLD AUTO: 37.8 % (ref 34–46.6)
HGB BLD-MCNC: 11.7 G/DL (ref 12–15.9)
IMM GRANULOCYTES # BLD AUTO: 0.03 10*3/MM3 (ref 0–0.05)
IMM GRANULOCYTES NFR BLD AUTO: 0.5 % (ref 0–0.5)
LYMPHOCYTES # BLD AUTO: 2.23 10*3/MM3 (ref 0.7–3.1)
LYMPHOCYTES NFR BLD AUTO: 33.9 % (ref 19.6–45.3)
MCH RBC QN AUTO: 28 PG (ref 26.6–33)
MCHC RBC AUTO-ENTMCNC: 31 G/DL (ref 31.5–35.7)
MCV RBC AUTO: 90.4 FL (ref 79–97)
MONOCYTES # BLD AUTO: 0.48 10*3/MM3 (ref 0.1–0.9)
MONOCYTES NFR BLD AUTO: 7.3 % (ref 5–12)
NEUTROPHILS NFR BLD AUTO: 3.59 10*3/MM3 (ref 1.7–7)
NEUTROPHILS NFR BLD AUTO: 54.5 % (ref 42.7–76)
NRBC BLD AUTO-RTO: 0 /100 WBC (ref 0–0.2)
PLATELET # BLD AUTO: 339 10*3/MM3 (ref 140–450)
PMV BLD AUTO: 9.4 FL (ref 6–12)
POTASSIUM SERPL-SCNC: 3.7 MMOL/L (ref 3.5–5.2)
PROT SERPL-MCNC: 6.9 G/DL (ref 6–8.5)
RBC # BLD AUTO: 4.18 10*6/MM3 (ref 3.77–5.28)
SODIUM SERPL-SCNC: 140 MMOL/L (ref 136–145)
WBC NRBC COR # BLD: 6.58 10*3/MM3 (ref 3.4–10.8)

## 2023-10-30 PROCEDURE — 85025 COMPLETE CBC W/AUTO DIFF WBC: CPT

## 2023-10-30 PROCEDURE — 82525 ASSAY OF COPPER: CPT

## 2023-10-30 PROCEDURE — 82746 ASSAY OF FOLIC ACID SERUM: CPT

## 2023-10-30 PROCEDURE — 84443 ASSAY THYROID STIM HORMONE: CPT

## 2023-10-30 PROCEDURE — 82306 VITAMIN D 25 HYDROXY: CPT

## 2023-10-30 PROCEDURE — 84425 ASSAY OF VITAMIN B-1: CPT

## 2023-10-30 PROCEDURE — 83540 ASSAY OF IRON: CPT

## 2023-10-30 PROCEDURE — 84446 ASSAY OF VITAMIN E: CPT

## 2023-10-30 PROCEDURE — 82607 VITAMIN B-12: CPT

## 2023-10-30 PROCEDURE — 84590 ASSAY OF VITAMIN A: CPT

## 2023-10-30 PROCEDURE — 80053 COMPREHEN METABOLIC PANEL: CPT

## 2023-10-30 PROCEDURE — 84630 ASSAY OF ZINC: CPT

## 2023-10-30 PROCEDURE — 84207 ASSAY OF VITAMIN B-6: CPT

## 2023-10-30 PROCEDURE — G0480 DRUG TEST DEF 1-7 CLASSES: HCPCS

## 2023-10-30 PROCEDURE — 83036 HEMOGLOBIN GLYCOSYLATED A1C: CPT

## 2023-10-30 PROCEDURE — 36415 COLL VENOUS BLD VENIPUNCTURE: CPT

## 2023-10-31 LAB
25(OH)D3 SERPL-MCNC: 19.1 NG/ML (ref 30–100)
FOLATE SERPL-MCNC: 11.9 NG/ML (ref 4.78–24.2)
HBA1C MFR BLD: 5.8 % (ref 4.8–5.6)
IRON 24H UR-MRATE: 112 MCG/DL (ref 37–145)
TSH SERPL DL<=0.05 MIU/L-ACNC: 2.38 UIU/ML (ref 0.27–4.2)
VIT B12 BLD-MCNC: 527 PG/ML (ref 211–946)

## 2023-11-03 LAB
PYRIDOXAL PHOS SERPL-MCNC: 10.5 UG/L (ref 3.4–65.2)
VIT B1 BLD-SCNC: 114.8 NMOL/L (ref 66.5–200)

## 2023-11-05 LAB
A-TOCOPHEROL VIT E SERPL-MCNC: 13.9 MG/L (ref 5.9–19.4)
GAMMA TOCOPHEROL SERPL-MCNC: 3.4 MG/L (ref 0.7–4.9)
VIT A SERPL-MCNC: 44 UG/DL (ref 18.9–57.3)

## 2023-11-06 ENCOUNTER — OFFICE VISIT (OUTPATIENT)
Dept: BARIATRICS/WEIGHT MGMT | Facility: CLINIC | Age: 38
End: 2023-11-06
Payer: COMMERCIAL

## 2023-11-06 VITALS
WEIGHT: 248.8 LBS | SYSTOLIC BLOOD PRESSURE: 127 MMHG | OXYGEN SATURATION: 98 % | DIASTOLIC BLOOD PRESSURE: 86 MMHG | HEART RATE: 86 BPM | HEIGHT: 65 IN | BODY MASS INDEX: 41.45 KG/M2 | TEMPERATURE: 98 F

## 2023-11-06 DIAGNOSIS — Z72.0 NICOTINE ABUSE: Primary | ICD-10-CM

## 2023-11-06 DIAGNOSIS — E78.2 MIXED HYPERLIPIDEMIA: ICD-10-CM

## 2023-11-06 DIAGNOSIS — R12 HEART BURN: ICD-10-CM

## 2023-11-06 DIAGNOSIS — E66.01 CLASS 3 SEVERE OBESITY DUE TO EXCESS CALORIES WITH SERIOUS COMORBIDITY AND BODY MASS INDEX (BMI) OF 40.0 TO 44.9 IN ADULT: ICD-10-CM

## 2023-11-06 LAB
COTININE SERPL-MCNC: <1 NG/ML
NICOTINE SERPL-MCNC: <1 NG/ML

## 2023-11-06 PROCEDURE — 99214 OFFICE O/P EST MOD 30 MIN: CPT | Performed by: SURGERY

## 2023-11-06 RX ORDER — MULTIVITAMIN WITH IRON
TABLET ORAL
COMMUNITY
Start: 2023-08-08 | End: 2023-11-06

## 2023-11-06 RX ORDER — GABAPENTIN 300 MG/1
300 CAPSULE ORAL
COMMUNITY

## 2023-11-06 RX ORDER — TRAMADOL HYDROCHLORIDE 50 MG/1
1 TABLET ORAL EVERY 6 HOURS PRN
COMMUNITY

## 2023-11-06 RX ORDER — ACETAMINOPHEN AND CODEINE PHOSPHATE 300; 30 MG/1; MG/1
TABLET ORAL
COMMUNITY
Start: 2023-10-27

## 2023-11-06 NOTE — H&P (VIEW-ONLY)
Patient Care Team:  Shaunna Choudhury APRN as PCP - General (Otolaryngology)    Reason for Visit:  Surgical Weight loss      Subjective     Lottie Plunkett is a pleasant 38 y.o. female and presents with morbid obesity with her Body mass index is 41.4 kg/m².    She is here for discussion of the upper endoscopic procedure.  She stated she has been with the disease of obesity for year(s).  She stated she suffers from hyperlipidemia and morbid obesity due to her weight gain.  She stated that weight loss helps alleviate these symptoms.   She stated that she has tried multiple diet regimens to help with weight loss.  She stated that she has attempted these conservative methods for weight loss without maintaining long term success.  Today she and myself will discuss surgical weight loss options such as the Laparoscopic Sleeve Gastrectomy or the Laparoscopic R - Y Gastric Bypass.  She also used a vape and has stopped the use of vaping.  She no longer uses nicotine products.      Review of Systems  General ROS: negative  Respiratory ROS: no cough, shortness of breath, or wheezing  Cardiovascular ROS: no chest pain or dyspnea on exertion  Gastrointestinal ROS: no abdominal pain, change in bowel habits, or black or bloody stools    History  Past Medical History:   Diagnosis Date    Allergic     Anxiety     Arthritis     Chronic pain disorder     TWO BACK SURGERY'S    H/O streptococcal infection     Urinary tract infection      Past Surgical History:   Procedure Laterality Date     SECTION  2003    SPINE SURGERY      Lumbar Laminectomy (12), Lumbar Disc Replacement (2016)     Family History   Problem Relation Age of Onset    Obesity Mother     Cancer Father     Obesity Paternal Grandmother     Stroke Paternal Grandmother      Social History     Tobacco Use    Smoking status: Former     Packs/day: 0.25     Years: 11.00     Additional pack years: 0.00     Total pack years: 2.75     Types:  Cigarettes     Quit date: 2022     Years since quittin.5    Smokeless tobacco: Never    Tobacco comments:     vapes 5ml; quit vaping last visit   Substance Use Topics    Alcohol use: Yes     Alcohol/week: 2.0 standard drinks of alcohol     Types: 2 Cans of beer per week     Comment: 1-2 DAILY    Drug use: No     E-cigarette/Vaping     E-cigarette/Vaping Substances     (Not in a hospital admission)   Allergies:  Mangifera indica fruit ext (willard) [mangifera indica] and Mushroom      Current Outpatient Medications:     ACETAMINOPHEN PO, Take  by mouth As Needed (last taken at 10 p.m. on last night)., Disp: , Rfl:     acetaminophen-codeine (TYLENOL with CODEINE #3) 300-30 MG per tablet, , Disp: , Rfl:     albuterol (PROVENTIL HFA;VENTOLIN HFA) 108 (90 Base) MCG/ACT inhaler, Inhale 2 puffs Every 4 (Four) Hours As Needed for Wheezing or Shortness of Air., Disp: 1 inhaler, Rfl: 0    Ascorbic Acid (VITAMIN C PO), Take  by mouth., Disp: , Rfl:     B Complex Vitamins (VITAMIN B COMPLEX PO), Take  by mouth., Disp: , Rfl:     BIOTIN PO, Take  by mouth., Disp: , Rfl:     calcium citrate-vitamin d (CITRACAL) 200-250 MG-UNIT tablet tablet, Take  by mouth Daily., Disp: , Rfl:     gabapentin (NEURONTIN) 300 MG capsule, Take 1 capsule by mouth., Disp: , Rfl:     ibuprofen (ADVIL,MOTRIN) 800 MG tablet, Take 1 tablet by mouth Every 6 (Six) Hours As Needed for Mild Pain., Disp: , Rfl:     Lactobacillus (DIGESTIVE HEALTH PROBIOTIC PO), Take  by mouth., Disp: , Rfl:     multivitamin with minerals tablet tablet, Take 1 tablet by mouth Daily., Disp: , Rfl:     simvastatin (ZOCOR) 10 MG tablet, Take 1 tablet by mouth Every Night., Disp: , Rfl:     traMADol (ULTRAM) 50 MG tablet, Take 1 tablet by mouth Every 6 (Six) Hours As Needed., Disp: , Rfl:     vitamin D (ERGOCALCIFEROL) 1.25 MG (21123 UT) capsule capsule, Take 1 capsule by mouth 1 (One) Time Per Week., Disp: , Rfl:     Objective     Vital Signs  Temp:  [98 °F (36.7 °C)] 98 °F  (36.7 °C)  Heart Rate:  [86] 86  BP: (127)/(86) 127/86  Body mass index is 41.4 kg/m².      11/06/23  1427   Weight: 113 kg (248 lb 12.8 oz)       Physical Exam:     HEENT: extra ocular movement intact and sclera clear, anicteric  Respiratory: appears well, vitals normal, no respiratory distress, acyanotic, normal RR, chest clear, no wheezing, crepitations, rhonchi, normal symmetric air entry  Cardiovascular: Regular rate and rhythm, S1, S2 normal, no murmur, click, rub or gallop  GI: Soft, non-tender, normal bowel sounds; no bruits, organomegaly or masses.  Abnormal shape: obese  Musculoskeletal: inspection - no abnormality  Neurologic: alert, oriented, normal speech, no focal findings or movement disorder noted       Results Review:   I reviewed the patient's new clinical results.        Assessment & Plan   Encounter Diagnoses   Name Primary?    Class 3 severe obesity due to excess calories with serious comorbidity and body mass index (BMI) of 40.0 to 44.9 in adult Yes    Mixed hyperlipidemia     Nicotine abuse            1.  I believe this patient will be a good candidate for weight loss surgery.  I have discussed the Keith - Y Gastric Bypass, laparoscopic sleeve gastrectomy and the Laparoscopic Gastric Band procedures.  We discussed the benefits of the surgeries including the benefit of weight loss and the possible reversal of co-morbid conditions associated with morbid obesity. I explained to the patient that prior to making a definitive decision on the type of surgery she will require an esophagogastroduodenoscopy with biopsies to assess for any contraindications for surgical weight loss.  I explained the alternatives  include not doing anything, or pursuing an UGI series which only offers a diagnosis with potential less accuracy compared to EGD, the benefits of the EGD such as identifying the pathology and anatomy of the upper GI system, and the risks and complications of the endoscopy were discussed in detail  as well. I discussed the risk of perforation (one out of 3576-3402, riskier with dilation), bleeding (one out of 500), and the rare risks of infection, adverse reaction to anesthesia, respiratory failure, cardiac failure including MI and adverse reaction to medications such as allergic reactions. We discussed consequences that could occur if a risk were to develop such as the need for hospitalization, blood transfusion, surgical intervention, medications, pain, disability and death. The patient verbalizes understanding and agrees to proceed. such as bleeding, perforation, swallowing difficulties and gas bloat can occur after this procedure.    Upon completion of our discussion and addressing and answering her questions to her satisfation, informed consent was obtained.  She will be scheduled accordingly for the esophagogastroduodenoscopy procedure.    I discussed the patients findings and my recommendations with patient.     Dr. Adrian Ryan MD [facility]    11/06/23  14:37 CST  Patient Care Team:  Shaunna Choudhury APRN as PCP - General (Otolaryngology)

## 2023-11-06 NOTE — PROGRESS NOTES
Patient Care Team:  Shaunna Choudhury APRN as PCP - General (Otolaryngology)    Reason for Visit:  Surgical Weight loss      Subjective     Lottie Plunkett is a pleasant 38 y.o. female and presents with morbid obesity with her Body mass index is 41.4 kg/m².    She is here for discussion of the upper endoscopic procedure.  She stated she has been with the disease of obesity for year(s).  She stated she suffers from hyperlipidemia and morbid obesity due to her weight gain.  She stated that weight loss helps alleviate these symptoms.   She stated that she has tried multiple diet regimens to help with weight loss.  She stated that she has attempted these conservative methods for weight loss without maintaining long term success.  Today she and myself will discuss surgical weight loss options such as the Laparoscopic Sleeve Gastrectomy or the Laparoscopic R - Y Gastric Bypass.  She also used a vape and has stopped the use of vaping.  She no longer uses nicotine products.      Review of Systems  General ROS: negative  Respiratory ROS: no cough, shortness of breath, or wheezing  Cardiovascular ROS: no chest pain or dyspnea on exertion  Gastrointestinal ROS: no abdominal pain, change in bowel habits, or black or bloody stools    History  Past Medical History:   Diagnosis Date    Allergic     Anxiety     Arthritis     Chronic pain disorder     TWO BACK SURGERY'S    H/O streptococcal infection     Urinary tract infection      Past Surgical History:   Procedure Laterality Date     SECTION  2003    SPINE SURGERY      Lumbar Laminectomy (12), Lumbar Disc Replacement (2016)     Family History   Problem Relation Age of Onset    Obesity Mother     Cancer Father     Obesity Paternal Grandmother     Stroke Paternal Grandmother      Social History     Tobacco Use    Smoking status: Former     Packs/day: 0.25     Years: 11.00     Additional pack years: 0.00     Total pack years: 2.75     Types:  Cigarettes     Quit date: 2022     Years since quittin.5    Smokeless tobacco: Never    Tobacco comments:     vapes 5ml; quit vaping last visit   Substance Use Topics    Alcohol use: Yes     Alcohol/week: 2.0 standard drinks of alcohol     Types: 2 Cans of beer per week     Comment: 1-2 DAILY    Drug use: No     E-cigarette/Vaping     E-cigarette/Vaping Substances     (Not in a hospital admission)   Allergies:  Mangifera indica fruit ext (willard) [mangifera indica] and Mushroom      Current Outpatient Medications:     ACETAMINOPHEN PO, Take  by mouth As Needed (last taken at 10 p.m. on last night)., Disp: , Rfl:     acetaminophen-codeine (TYLENOL with CODEINE #3) 300-30 MG per tablet, , Disp: , Rfl:     albuterol (PROVENTIL HFA;VENTOLIN HFA) 108 (90 Base) MCG/ACT inhaler, Inhale 2 puffs Every 4 (Four) Hours As Needed for Wheezing or Shortness of Air., Disp: 1 inhaler, Rfl: 0    Ascorbic Acid (VITAMIN C PO), Take  by mouth., Disp: , Rfl:     B Complex Vitamins (VITAMIN B COMPLEX PO), Take  by mouth., Disp: , Rfl:     BIOTIN PO, Take  by mouth., Disp: , Rfl:     calcium citrate-vitamin d (CITRACAL) 200-250 MG-UNIT tablet tablet, Take  by mouth Daily., Disp: , Rfl:     gabapentin (NEURONTIN) 300 MG capsule, Take 1 capsule by mouth., Disp: , Rfl:     ibuprofen (ADVIL,MOTRIN) 800 MG tablet, Take 1 tablet by mouth Every 6 (Six) Hours As Needed for Mild Pain., Disp: , Rfl:     Lactobacillus (DIGESTIVE HEALTH PROBIOTIC PO), Take  by mouth., Disp: , Rfl:     multivitamin with minerals tablet tablet, Take 1 tablet by mouth Daily., Disp: , Rfl:     simvastatin (ZOCOR) 10 MG tablet, Take 1 tablet by mouth Every Night., Disp: , Rfl:     traMADol (ULTRAM) 50 MG tablet, Take 1 tablet by mouth Every 6 (Six) Hours As Needed., Disp: , Rfl:     vitamin D (ERGOCALCIFEROL) 1.25 MG (36390 UT) capsule capsule, Take 1 capsule by mouth 1 (One) Time Per Week., Disp: , Rfl:     Objective     Vital Signs  Temp:  [98 °F (36.7 °C)] 98 °F  (36.7 °C)  Heart Rate:  [86] 86  BP: (127)/(86) 127/86  Body mass index is 41.4 kg/m².      11/06/23  1427   Weight: 113 kg (248 lb 12.8 oz)       Physical Exam:     HEENT: extra ocular movement intact and sclera clear, anicteric  Respiratory: appears well, vitals normal, no respiratory distress, acyanotic, normal RR, chest clear, no wheezing, crepitations, rhonchi, normal symmetric air entry  Cardiovascular: Regular rate and rhythm, S1, S2 normal, no murmur, click, rub or gallop  GI: Soft, non-tender, normal bowel sounds; no bruits, organomegaly or masses.  Abnormal shape: obese  Musculoskeletal: inspection - no abnormality  Neurologic: alert, oriented, normal speech, no focal findings or movement disorder noted       Results Review:   I reviewed the patient's new clinical results.        Assessment & Plan   Encounter Diagnoses   Name Primary?    Class 3 severe obesity due to excess calories with serious comorbidity and body mass index (BMI) of 40.0 to 44.9 in adult Yes    Mixed hyperlipidemia     Nicotine abuse            1.  I believe this patient will be a good candidate for weight loss surgery.  I have discussed the Keith - Y Gastric Bypass, laparoscopic sleeve gastrectomy and the Laparoscopic Gastric Band procedures.  We discussed the benefits of the surgeries including the benefit of weight loss and the possible reversal of co-morbid conditions associated with morbid obesity. I explained to the patient that prior to making a definitive decision on the type of surgery she will require an esophagogastroduodenoscopy with biopsies to assess for any contraindications for surgical weight loss.  I explained the alternatives  include not doing anything, or pursuing an UGI series which only offers a diagnosis with potential less accuracy compared to EGD, the benefits of the EGD such as identifying the pathology and anatomy of the upper GI system, and the risks and complications of the endoscopy were discussed in detail  as well. I discussed the risk of perforation (one out of 9864-8005, riskier with dilation), bleeding (one out of 500), and the rare risks of infection, adverse reaction to anesthesia, respiratory failure, cardiac failure including MI and adverse reaction to medications such as allergic reactions. We discussed consequences that could occur if a risk were to develop such as the need for hospitalization, blood transfusion, surgical intervention, medications, pain, disability and death. The patient verbalizes understanding and agrees to proceed. such as bleeding, perforation, swallowing difficulties and gas bloat can occur after this procedure.    Upon completion of our discussion and addressing and answering her questions to her satisfation, informed consent was obtained.  She will be scheduled accordingly for the esophagogastroduodenoscopy procedure.    I discussed the patients findings and my recommendations with patient.     Dr. Adrian Ryan MD [facility]    11/06/23  14:37 CST  Patient Care Team:  Shaunna Choudhury APRN as PCP - General (Otolaryngology)

## 2023-11-07 NOTE — OP NOTE
PREOPERATIVE DIAGNOSES:   1.  AUB    POSTOPERATIVE DIAGNOSES:   1.  AUB     PROCEDURE PERFORMED:    1. EUA  2. Hysteroscopy  3. Dilation   4. Myosure Biopsies  5. Novasure endometrial ablation    ANESTHESIA:  General    SURGEON:  Dr. Nivia Montemayor:  * No surgical staff found *     ESTIMATED BLOOD LOSS:  10 mL    COMPLICATIONS:  None    SPECIMENS:  Endometrial tissue    FINDINGS:  Normal uterine cavity with thickened endometrial tissue. DESCRIPTION OF PROCEDURE:  The patient was taken to the operating room where she was placed under general anesthesia. She was positioned in dorsal lithotomy, prepped and draped in usual sterile fashion. A weighted speculum was placed into the vagina and the anterior lip of the cervix was grasped with a single-tooth tenaculum. The os was serially dilated to accommodate the hysteroscope which was placed without complication and the above findings were noted. Directed biopsies taken under direct visualization. At this time, the hysteroscope was removed. Next the Novasure device was placed and seated with the above measurements noted. Cavity assessment was performed and proved to be sound. The device was then engaged and treatment carried out. Final look with the hysteroscope showed good treatment effect. At this time, all instruments were removed. Tenaculum sites were found to be hemostatic. The procedure was concluded. The patient was awakened in the operating room and brought to PACU in stable condition.

## 2023-11-10 LAB
COPPER SERPL-MCNC: 128 UG/DL (ref 80–158)
ZINC SERPL-MCNC: 66 UG/DL (ref 44–115)

## 2023-11-28 ENCOUNTER — TELEPHONE (OUTPATIENT)
Dept: BARIATRICS/WEIGHT MGMT | Facility: CLINIC | Age: 38
End: 2023-11-28
Payer: COMMERCIAL

## 2023-11-28 NOTE — TELEPHONE ENCOUNTER
Patient called in regard to EGD scheduled for Friday. Instructions were given    BA-EGD       Patient was called and reminded of their procedure with Dr Ryan on        Instructions:     Eat normal the day before your procedure until 8 p.m. in the evening.    Beginning at 8pm clear liquids only-no Red or Pink in Color   Nothing to eat or drink after midnight.  Bring a list of medications.   Advised that they must bring a  as that IV sedation is being used.           The patient voiced an understanding and was agreeable.      Patient voiced an understanding

## 2023-11-30 ENCOUNTER — HOSPITAL ENCOUNTER (OUTPATIENT)
Dept: CARDIOLOGY | Facility: HOSPITAL | Age: 38
Discharge: HOME | End: 2023-11-30
Admitting: NURSE PRACTITIONER
Payer: COMMERCIAL

## 2023-11-30 DIAGNOSIS — E78.2 MIXED HYPERLIPIDEMIA: ICD-10-CM

## 2023-11-30 DIAGNOSIS — Z13.21 SCREENING FOR MALNUTRITION: ICD-10-CM

## 2023-11-30 DIAGNOSIS — E66.01 CLASS 3 SEVERE OBESITY DUE TO EXCESS CALORIES WITH SERIOUS COMORBIDITY AND BODY MASS INDEX (BMI) OF 40.0 TO 44.9 IN ADULT: ICD-10-CM

## 2023-11-30 PROCEDURE — 93005 ELECTROCARDIOGRAM TRACING: CPT | Performed by: NURSE PRACTITIONER

## 2023-12-01 ENCOUNTER — ANESTHESIA (OUTPATIENT)
Dept: GASTROENTEROLOGY | Facility: HOSPITAL | Age: 38
End: 2023-12-01
Payer: COMMERCIAL

## 2023-12-01 ENCOUNTER — ANESTHESIA EVENT (OUTPATIENT)
Dept: GASTROENTEROLOGY | Facility: HOSPITAL | Age: 38
End: 2023-12-01
Payer: COMMERCIAL

## 2023-12-01 ENCOUNTER — HOSPITAL ENCOUNTER (OUTPATIENT)
Facility: HOSPITAL | Age: 38
Setting detail: HOSPITAL OUTPATIENT SURGERY
Discharge: HOME | End: 2023-12-01
Attending: SURGERY | Admitting: SURGERY
Payer: COMMERCIAL

## 2023-12-01 VITALS
RESPIRATION RATE: 18 BRPM | WEIGHT: 247 LBS | OXYGEN SATURATION: 97 % | SYSTOLIC BLOOD PRESSURE: 113 MMHG | HEIGHT: 64 IN | TEMPERATURE: 97 F | HEART RATE: 67 BPM | DIASTOLIC BLOOD PRESSURE: 78 MMHG | BODY MASS INDEX: 42.17 KG/M2

## 2023-12-01 DIAGNOSIS — E78.2 MIXED HYPERLIPIDEMIA: ICD-10-CM

## 2023-12-01 DIAGNOSIS — E66.01 CLASS 3 SEVERE OBESITY DUE TO EXCESS CALORIES WITH SERIOUS COMORBIDITY AND BODY MASS INDEX (BMI) OF 40.0 TO 44.9 IN ADULT: ICD-10-CM

## 2023-12-01 DIAGNOSIS — R12 HEART BURN: ICD-10-CM

## 2023-12-01 DIAGNOSIS — Z72.0 NICOTINE ABUSE: ICD-10-CM

## 2023-12-01 LAB — B-HCG UR QL: NEGATIVE

## 2023-12-01 PROCEDURE — 25810000003 SODIUM CHLORIDE 0.9 % SOLUTION: Performed by: ANESTHESIOLOGY

## 2023-12-01 PROCEDURE — 25010000002 PROPOFOL 10 MG/ML EMULSION: Performed by: NURSE ANESTHETIST, CERTIFIED REGISTERED

## 2023-12-01 PROCEDURE — 87081 CULTURE SCREEN ONLY: CPT | Performed by: SURGERY

## 2023-12-01 PROCEDURE — 81025 URINE PREGNANCY TEST: CPT | Performed by: ANESTHESIOLOGY

## 2023-12-01 RX ORDER — SODIUM CHLORIDE 0.9 % (FLUSH) 0.9 %
10 SYRINGE (ML) INJECTION AS NEEDED
Status: DISCONTINUED | OUTPATIENT
Start: 2023-12-01 | End: 2023-12-01 | Stop reason: HOSPADM

## 2023-12-01 RX ORDER — LIDOCAINE HYDROCHLORIDE 10 MG/ML
0.5 INJECTION, SOLUTION EPIDURAL; INFILTRATION; INTRACAUDAL; PERINEURAL ONCE AS NEEDED
Status: CANCELLED | OUTPATIENT
Start: 2023-12-01

## 2023-12-01 RX ORDER — SODIUM CHLORIDE 0.9 % (FLUSH) 0.9 %
10 SYRINGE (ML) INJECTION EVERY 12 HOURS SCHEDULED
Status: CANCELLED | OUTPATIENT
Start: 2023-12-01

## 2023-12-01 RX ORDER — LIDOCAINE HYDROCHLORIDE 20 MG/ML
INJECTION, SOLUTION EPIDURAL; INFILTRATION; INTRACAUDAL; PERINEURAL AS NEEDED
Status: DISCONTINUED | OUTPATIENT
Start: 2023-12-01 | End: 2023-12-01 | Stop reason: SURG

## 2023-12-01 RX ORDER — SODIUM CHLORIDE 9 MG/ML
100 INJECTION, SOLUTION INTRAVENOUS CONTINUOUS
Status: CANCELLED | OUTPATIENT
Start: 2023-12-01

## 2023-12-01 RX ORDER — SODIUM CHLORIDE 0.9 % (FLUSH) 0.9 %
10 SYRINGE (ML) INJECTION AS NEEDED
Status: CANCELLED | OUTPATIENT
Start: 2023-12-01

## 2023-12-01 RX ORDER — PROPOFOL 10 MG/ML
VIAL (ML) INTRAVENOUS AS NEEDED
Status: DISCONTINUED | OUTPATIENT
Start: 2023-12-01 | End: 2023-12-01 | Stop reason: SURG

## 2023-12-01 RX ORDER — SODIUM CHLORIDE 9 MG/ML
40 INJECTION, SOLUTION INTRAVENOUS AS NEEDED
Status: CANCELLED | OUTPATIENT
Start: 2023-12-01

## 2023-12-01 RX ORDER — SODIUM CHLORIDE 9 MG/ML
500 INJECTION, SOLUTION INTRAVENOUS CONTINUOUS PRN
Status: DISCONTINUED | OUTPATIENT
Start: 2023-12-01 | End: 2023-12-01 | Stop reason: HOSPADM

## 2023-12-01 RX ADMIN — LIDOCAINE HYDROCHLORIDE 100 MG: 20 INJECTION, SOLUTION EPIDURAL; INFILTRATION; INTRACAUDAL; PERINEURAL at 08:52

## 2023-12-01 RX ADMIN — PROPOFOL INJECTABLE EMULSION 100 MG: 10 INJECTION, EMULSION INTRAVENOUS at 08:52

## 2023-12-01 RX ADMIN — SODIUM CHLORIDE 500 ML: 9 INJECTION, SOLUTION INTRAVENOUS at 08:07

## 2023-12-01 RX ADMIN — PROPOFOL INJECTABLE EMULSION 100 MG: 10 INJECTION, EMULSION INTRAVENOUS at 08:53

## 2023-12-01 RX ADMIN — LIDOCAINE HYDROCHLORIDE 100 MG: 20 INJECTION, SOLUTION EPIDURAL; INFILTRATION; INTRACAUDAL; PERINEURAL at 08:53

## 2023-12-01 NOTE — ANESTHESIA POSTPROCEDURE EVALUATION
Patient: Lottie Plunkett    Procedure Summary       Date: 12/01/23 Room / Location: [facility] ENDOSCOPY 6 / [facility]    Anesthesia Start: 0849 Anesthesia Stop: 0900    Procedure: ESOPHAGOGASTRODUODENOSCOPY WITH ANESTHESIA Diagnosis:       Class 3 severe obesity due to excess calories with serious comorbidity and body mass index (BMI) of 40.0 to 44.9 in adult      Mixed hyperlipidemia      Nicotine abuse      Heart burn      (Class 3 severe obesity due to excess calories with serious comorbidity and body mass index (BMI) of 40.0 to 44.9 in adult [E66.01, Z68.41])      (Mixed hyperlipidemia [E78.2])      (Nicotine abuse [Z72.0])      (Heart burn [R12])    Surgeons: Adrian Ryan MD Provider: Monica Gomez CRNA    Anesthesia Type: MAC ASA Status: 3            Anesthesia Type: MAC    Vitals  Vitals Value Taken Time   /67 12/01/23 0911   Temp     Pulse 69 12/01/23 0914   Resp 18 12/01/23 0905   SpO2 99 % 12/01/23 0914   Vitals shown include unfiled device data.        Post Anesthesia Care and Evaluation    Patient location during evaluation: PHASE II  Patient participation: complete - patient participated  Level of consciousness: awake and alert  Pain management: adequate    Airway patency: patent  Anesthetic complications: No anesthetic complications  PONV Status: none  Cardiovascular status: acceptable  Respiratory status: acceptable  Hydration status: acceptable

## 2023-12-01 NOTE — ANESTHESIA PREPROCEDURE EVALUATION
Anesthesia Evaluation     no history of anesthetic complications:   NPO Solid Status: > 8 hours  NPO Liquid Status: > 8 hours           Airway   Mallampati: I  TM distance: >3 FB  Dental      Pulmonary    (+) a smoker Former, asthma,sleep apnea  Cardiovascular     (+) hyperlipidemia      Neuro/Psych  (+) psychiatric history Anxiety  GI/Hepatic/Renal/Endo    (+) morbid obesity    Musculoskeletal     Abdominal    Substance History      OB/GYN          Other                    Anesthesia Plan    ASA 3     MAC     intravenous induction     Anesthetic plan, risks, benefits, and alternatives have been provided, discussed and informed consent has been obtained with: patient.    CODE STATUS:

## 2023-12-02 LAB
QT INTERVAL: 406 MS
QTC INTERVAL: 441 MS
UREASE TISS QL: NEGATIVE

## 2023-12-04 ENCOUNTER — OFFICE VISIT (OUTPATIENT)
Dept: BARIATRICS/WEIGHT MGMT | Facility: CLINIC | Age: 38
End: 2023-12-04
Payer: COMMERCIAL

## 2023-12-04 VITALS
OXYGEN SATURATION: 100 % | DIASTOLIC BLOOD PRESSURE: 82 MMHG | SYSTOLIC BLOOD PRESSURE: 136 MMHG | TEMPERATURE: 97.4 F | HEIGHT: 65 IN | WEIGHT: 243.2 LBS | BODY MASS INDEX: 40.52 KG/M2 | HEART RATE: 80 BPM

## 2023-12-04 DIAGNOSIS — E66.01 CLASS 3 SEVERE OBESITY DUE TO EXCESS CALORIES WITH SERIOUS COMORBIDITY AND BODY MASS INDEX (BMI) OF 40.0 TO 44.9 IN ADULT: Primary | ICD-10-CM

## 2023-12-04 DIAGNOSIS — R12 HEART BURN: ICD-10-CM

## 2023-12-04 DIAGNOSIS — E78.2 MIXED HYPERLIPIDEMIA: ICD-10-CM

## 2023-12-04 PROCEDURE — 99214 OFFICE O/P EST MOD 30 MIN: CPT | Performed by: SURGERY

## 2023-12-04 RX ORDER — GABAPENTIN 250 MG/5ML
250 SOLUTION ORAL ONCE
OUTPATIENT
Start: 2023-12-04 | End: 2023-12-04

## 2023-12-04 RX ORDER — ONDANSETRON 2 MG/ML
4 INJECTION INTRAMUSCULAR; INTRAVENOUS EVERY 6 HOURS PRN
OUTPATIENT
Start: 2023-12-04

## 2023-12-04 RX ORDER — ENOXAPARIN SODIUM 100 MG/ML
40 INJECTION SUBCUTANEOUS ONCE
OUTPATIENT
Start: 2023-12-04 | End: 2023-12-04

## 2023-12-04 RX ORDER — SCOLOPAMINE TRANSDERMAL SYSTEM 1 MG/1
1 PATCH, EXTENDED RELEASE TRANSDERMAL CONTINUOUS
OUTPATIENT
Start: 2023-12-04 | End: 2023-12-07

## 2023-12-04 NOTE — PROGRESS NOTES
Patient Care Team:  Shaunna Choudhury APRN as PCP - General (Otolaryngology)    Reason for Visit:  Surgical Weight loss    Subjective      Lottie Plunkett is a pleasant 38 y.o. female and presents with morbid obesity with her Body mass index is 40.47 kg/m².    She is here for discussion of weight loss options.  She stated she has been with the disease of obesity for year(s).  She stated she suffers from hyperlipidemia and chronic pain in the back and morbid obesity due to her weight gain.  She stated that weight loss helps alleviate these symptoms.   She stated that she has tried diets in the past to help with weight loss.  She stated that she has attempted these conservative methods for weight loss without maintaining long term success.  Today she  would like to discuss surgical weight loss options such as the Laparoscopic Sleeve Gastrectomy or the Laparoscopic R - Y Gastric Bypass.      Review of Systems  General ROS: negative  Respiratory ROS: no cough, shortness of breath, or wheezing  Cardiovascular ROS: no chest pain or dyspnea on exertion  Gastrointestinal ROS: no abdominal pain, change in bowel habits, or black or bloody stools    History  Past Medical History:   Diagnosis Date    Allergic     Anxiety     Arthritis     Asthma     Chronic pain disorder     TWO BACK SURGERY'S    H/O streptococcal infection     Hyperlipidemia     Sleep apnea     does not wear machine    Urinary tract infection      Past Surgical History:   Procedure Laterality Date     SECTION  2003    DILATION AND CURETTAGE, DIAGNOSTIC / THERAPEUTIC      ENDOSCOPY N/A 2023    Procedure: ESOPHAGOGASTRODUODENOSCOPY WITH ANESTHESIA;  Surgeon: Adrian Ryan MD;  Location: [facility] ENDOSCOPY;  Service: General;  Laterality: N/A;  pre op:   post op:normal    SPINE SURGERY      Lumbar Laminectomy (12), Lumbar Disc Replacement (2016)     Family History   Problem Relation Age of Onset    Obesity Mother      Cancer Father     Obesity Paternal Grandmother     Stroke Paternal Grandmother      Social History     Tobacco Use    Smoking status: Former     Packs/day: 0.25     Years: 11.00     Additional pack years: 0.00     Total pack years: 2.75     Types: Cigarettes     Quit date: 2022     Years since quittin.6    Smokeless tobacco: Never    Tobacco comments:     vapes 5ml; quit vaping last visit   Vaping Use    Vaping Use: Former    Quit date: 10/1/2023   Substance Use Topics    Alcohol use: Not Currently     Alcohol/week: 2.0 standard drinks of alcohol     Types: 2 Cans of beer per week    Drug use: No     E-cigarette/Vaping    E-cigarette/Vaping Use Former User     Quit Date 10/1/23      E-cigarette/Vaping Substances     (Not in a hospital admission)   Allergies:  Mangifera indica fruit ext (willard) [mangifera indica] and Mushroom      Current Outpatient Medications:     ACETAMINOPHEN PO, Take  by mouth As Needed (last taken at 10 p.m. on last night)., Disp: , Rfl:     albuterol (PROVENTIL HFA;VENTOLIN HFA) 108 (90 Base) MCG/ACT inhaler, Inhale 2 puffs Every 4 (Four) Hours As Needed for Wheezing or Shortness of Air., Disp: 1 inhaler, Rfl: 0    Ascorbic Acid (VITAMIN C PO), Take  by mouth., Disp: , Rfl:     B Complex Vitamins (VITAMIN B COMPLEX PO), Take  by mouth., Disp: , Rfl:     BIOTIN PO, Take  by mouth., Disp: , Rfl:     calcium citrate-vitamin d (CITRACAL) 200-250 MG-UNIT tablet tablet, Take  by mouth Daily., Disp: , Rfl:     gabapentin (NEURONTIN) 300 MG capsule, Take 1 capsule by mouth., Disp: , Rfl:     ibuprofen (ADVIL,MOTRIN) 800 MG tablet, Take 1 tablet by mouth Every 6 (Six) Hours As Needed for Mild Pain., Disp: , Rfl:     Lactobacillus (DIGESTIVE HEALTH PROBIOTIC PO), Take  by mouth., Disp: , Rfl:     multivitamin with minerals tablet tablet, Take 1 tablet by mouth Daily., Disp: , Rfl:     simvastatin (ZOCOR) 10 MG tablet, Take 1 tablet by mouth Every Night., Disp: , Rfl:     traMADol (ULTRAM) 50  MG tablet, Take 1 tablet by mouth Every 6 (Six) Hours As Needed., Disp: , Rfl:     vitamin D (ERGOCALCIFEROL) 1.25 MG (33974 UT) capsule capsule, Take 1 capsule by mouth 1 (One) Time Per Week., Disp: , Rfl:     Objective     Vital Signs  Temp:  [97.4 °F (36.3 °C)] 97.4 °F (36.3 °C)  Heart Rate:  [80] 80  BP: (136)/(82) 136/82  Body mass index is 40.47 kg/m².      12/04/23  1459   Weight: 110 kg (243 lb 3.2 oz)       General Appearance:  awake, alert, oriented, in no acute distress  Lungs:  Normal expansion.  Clear to auscultation.  No rales, rhonchi, or wheezing.  Heart:  Heart regular rate and rhythm  Abdomen:  Soft, non-tender, normal bowel sounds; no bruits, organomegaly or masses.  Abnormal shape: obese    Results Review:   I reviewed the patient's new clinical results.        Assessment & Plan   Encounter Diagnoses   Name Primary?    Class 3 severe obesity due to excess calories with serious comorbidity and body mass index (BMI) of 40.0 to 44.9 in adult Yes    Mixed hyperlipidemia     Heart burn        AKD Plan List: I believe this patient will be a good candidate for weight loss surgery.    She has chosen laparoscopic sleeve gastrectomy. I agree with this decision.  I have discussed the Keith - Y Gastric Bypass, laparoscopic sleeve gastrectomy and the Laparoscopic Gastric Band procedures to provide the alternatives which includes non surgical weight loss options as well.  We discussed the benefits of the surgeries including the benefit of weight loss and the possible reversal of co-morbid conditions associated with morbid obesity.  She is aware that the Sleeve procedure is not reversible.  We discussed the complications and risks which include the risk of perforation, leakage,bleeding, intra-abdominal organ injury, specifically at high risks of the liver and spleen, stenosis or ulcerations, the risk of venous thrombosis formation in the lungs, mesentery veins or lower extremities  leading to possible organ  injury and death.  I explained to the patient that there is a risk of infection.  I explained to her the possibility that this procedure may not be performed laparoscopically and may require being converted to an opened procedure or aborted due to abnormal anatomy.  Also postoperatively there is a risk of increased GERD symptoms after this procedure.  I have explained if she develops intestinal metaplasia of her esophagus consideration for conversion to another weight loss procedure may be necessary.    I have explained to the patient that depression, addictive behaviors and even an increase in suicidal emotions can occur after surgery and even though they have undergone a mental health evaluation through psychology/psychiatry or by a registered therapist she may require additional evaluation and treatment in the future.  Nicotine cessation needs to continue even after their surgical procedure and nicotine products should be avoided due to the side effects of these products.  I explained to Lottie Lou Kiarra not to plan for pregnancy within 12 to 18 months of her procedure.  I explained to her postoperatively she should avoid having unprotected sex that would increase her risk of pregnancy during the postoperative period of 1 to 1-1/2 years.   she  was explained the importance of maintaining long-term nutritional supplementation such as multivitamins due to the nature of weight loss surgeries and the potential of malnutrition associated with the surgical procedures.      I have reviewed with the patient her 30-day mortality risk using the ACS Surgical Risk Calculator to be less than .04%.    I explained to the patient that the success of the surgery is directly related to the motivation and dedication to behavioral changes that they have learned during their preoperative course.  There is data that shows approximately 10% of patients may have satisfactory weight loss or regain their weight they have lost after  surgery.  It is more likely due to returning to the previous behaviors they incorporated during their disease of obesity.  I also encourage Lottie Plunkett to incorporate exercise again after surgery weight restrictions have been removed postoperatively.    Karol Report   As part of this patient's treatment plan I am prescribing controlled substances.  We review Karol in our educational course.  The patient has been made aware of appropriate use of such medications, including potential risk of somnolence, limited ability to drive and /or work safely, and potential for dependence or overdose. It has also been made clear that these medications are for use by this patient only, without concomitant use of alcohol or other substances unless prescribed.    Lottie Plunkett will be required to complete the educational preoperative class detailing terms of the preoperative and postoperative recommendations, risks of surgery, the monitoring of the patient's KAROL reports if necessary. Lottie Plunkett is aware that inappropriate use of prescribed medications will result in cessation of prescribing such medications.    KAROL report has been reviewed.      History and physical exam exhibit continued safe and appropriate use of controlled substances.       Lottie Plunkett understands the surgical procedures and the different surgical options that are available.   Lottie Plunkett understands the lifestyle changes that are required after surgery and has agreed to follow the guidelines outlined in the weight management program. Lottie Plunkett also expressed understanding of the risks involved and had all of her questions answered and desires to proceed.    Upon completion of our discussion and addressing and answering her questions to her satisfaction, informed consent was obtained.   She will be scheduled accordingly for a laparoscopic Sleeve gastrectomy procedure.    I discussed the  patient's findings and my recommendations with patient. I have also recommended that she obtain preop education course, laboratory results and completion of her pre-op diet prior to surgery consideration.    Dr. Adrian Ryan MD [facility]    12/04/23  15:13 CST  Patient Care Team:  Shaunna Choudhury APRN as PCP - General (Otolaryngology)

## 2023-12-08 ENCOUNTER — TELEPHONE (OUTPATIENT)
Dept: BARIATRICS/WEIGHT MGMT | Facility: CLINIC | Age: 38
End: 2023-12-08
Payer: COMMERCIAL

## 2023-12-08 ENCOUNTER — TREATMENT (OUTPATIENT)
Dept: BARIATRICS/WEIGHT MGMT | Facility: CLINIC | Age: 38
End: 2023-12-08
Payer: COMMERCIAL

## 2023-12-08 ENCOUNTER — LAB (OUTPATIENT)
Dept: LAB | Facility: HOSPITAL | Age: 38
End: 2023-12-08
Payer: COMMERCIAL

## 2023-12-08 VITALS — WEIGHT: 242.6 LBS | BODY MASS INDEX: 40.42 KG/M2 | HEIGHT: 65 IN

## 2023-12-08 DIAGNOSIS — Z72.0 NICOTINE ABUSE: ICD-10-CM

## 2023-12-08 DIAGNOSIS — E66.01 CLASS 3 SEVERE OBESITY DUE TO EXCESS CALORIES WITH SERIOUS COMORBIDITY AND BODY MASS INDEX (BMI) OF 40.0 TO 44.9 IN ADULT: ICD-10-CM

## 2023-12-08 LAB
ALBUMIN SERPL-MCNC: 4.1 G/DL (ref 3.5–5.2)
ALBUMIN/GLOB SERPL: 1.2 G/DL
ALP SERPL-CCNC: 73 U/L (ref 39–117)
ALT SERPL W P-5'-P-CCNC: 23 U/L (ref 1–33)
ANION GAP SERPL CALCULATED.3IONS-SCNC: 14 MMOL/L (ref 5–15)
APTT PPP: 31.8 SECONDS (ref 24.5–36)
AST SERPL-CCNC: 17 U/L (ref 1–32)
BACTERIA UR QL AUTO: ABNORMAL /HPF
BILIRUB SERPL-MCNC: 0.2 MG/DL (ref 0–1.2)
BILIRUB UR QL STRIP: NEGATIVE
BUN SERPL-MCNC: 10 MG/DL (ref 6–20)
BUN/CREAT SERPL: 18.2 (ref 7–25)
CALCIUM SPEC-SCNC: 8.9 MG/DL (ref 8.6–10.5)
CHLORIDE SERPL-SCNC: 100 MMOL/L (ref 98–107)
CLARITY UR: CLEAR
CO2 SERPL-SCNC: 23 MMOL/L (ref 22–29)
COLOR UR: ABNORMAL
COTININE UR-MCNC: POSITIVE NG/ML
CREAT SERPL-MCNC: 0.55 MG/DL (ref 0.57–1)
DEPRECATED RDW RBC AUTO: 43.7 FL (ref 37–54)
EGFRCR SERPLBLD CKD-EPI 2021: 119.7 ML/MIN/1.73
ERYTHROCYTE [DISTWIDTH] IN BLOOD BY AUTOMATED COUNT: 13.7 % (ref 12.3–15.4)
GLOBULIN UR ELPH-MCNC: 3.4 GM/DL
GLUCOSE SERPL-MCNC: 90 MG/DL (ref 65–99)
GLUCOSE UR STRIP-MCNC: NEGATIVE MG/DL
HBA1C MFR BLD: 5.9 % (ref 4.8–5.6)
HCT VFR BLD AUTO: 40.4 % (ref 34–46.6)
HGB BLD-MCNC: 12.6 G/DL (ref 12–15.9)
HGB UR QL STRIP.AUTO: ABNORMAL
HYALINE CASTS UR QL AUTO: ABNORMAL /LPF
INR PPP: 0.96 (ref 0.91–1.09)
KETONES UR QL STRIP: ABNORMAL
LEUKOCYTE ESTERASE UR QL STRIP.AUTO: NEGATIVE
MCH RBC QN AUTO: 27.5 PG (ref 26.6–33)
MCHC RBC AUTO-ENTMCNC: 31.2 G/DL (ref 31.5–35.7)
MCV RBC AUTO: 88 FL (ref 79–97)
NITRITE UR QL STRIP: NEGATIVE
PH UR STRIP.AUTO: 5.5 [PH] (ref 5–8)
PLATELET # BLD AUTO: 345 10*3/MM3 (ref 140–450)
PMV BLD AUTO: 9.5 FL (ref 6–12)
POTASSIUM SERPL-SCNC: 4.4 MMOL/L (ref 3.5–5.2)
PROT SERPL-MCNC: 7.5 G/DL (ref 6–8.5)
PROT UR QL STRIP: NEGATIVE
PROTHROMBIN TIME: 12.8 SECONDS (ref 11.8–14.8)
RBC # BLD AUTO: 4.59 10*6/MM3 (ref 3.77–5.28)
RBC # UR STRIP: ABNORMAL /HPF
REF LAB TEST METHOD: ABNORMAL
SODIUM SERPL-SCNC: 137 MMOL/L (ref 136–145)
SP GR UR STRIP: 1.01 (ref 1–1.03)
SQUAMOUS #/AREA URNS HPF: ABNORMAL /HPF
UROBILINOGEN UR QL STRIP: ABNORMAL
WBC # UR STRIP: ABNORMAL /HPF
WBC NRBC COR # BLD AUTO: 7.66 10*3/MM3 (ref 3.4–10.8)

## 2023-12-08 PROCEDURE — G0480 DRUG TEST DEF 1-7 CLASSES: HCPCS

## 2023-12-08 PROCEDURE — 83036 HEMOGLOBIN GLYCOSYLATED A1C: CPT

## 2023-12-08 PROCEDURE — 81001 URINALYSIS AUTO W/SCOPE: CPT

## 2023-12-08 PROCEDURE — 36415 COLL VENOUS BLD VENIPUNCTURE: CPT

## 2023-12-08 PROCEDURE — 85027 COMPLETE CBC AUTOMATED: CPT

## 2023-12-08 PROCEDURE — 85730 THROMBOPLASTIN TIME PARTIAL: CPT

## 2023-12-08 PROCEDURE — 87086 URINE CULTURE/COLONY COUNT: CPT

## 2023-12-08 PROCEDURE — 80053 COMPREHEN METABOLIC PANEL: CPT

## 2023-12-08 PROCEDURE — 85610 PROTHROMBIN TIME: CPT

## 2023-12-08 NOTE — TELEPHONE ENCOUNTER
Notified patient that her sx would be canceled due to a positive nicotine test. Patient voiced understanding and will follow up and re-due test in 30 days from last time she smoked and should be able to re-submit for sx.

## 2023-12-08 NOTE — PROGRESS NOTES
Date: 12/8/23                Information and Education Class for Pre and Post                           Surgery Care, Diets and Daily Living.       Surgery Type: Sleeve Gastrectomy Consent on File    Height: 242.6lbs  Weight: 5'5  BMI: 40.37    Diet Stages Form given including diet stages and surgery dates/times   Weight Loss Surgery Patient Contract on File      Patient has signed/agreed with the Diet Stage & Medication discontinue sheet:                  1) Medications have been review by Dr Ryan.                2) Patient informed to stop NSAID'S one week prior to surgery.                         If the patient is taking Metformin he/she will need to discontinue                   two weeks prior to surgery.                      Birth control Medications are to be discontinued two weeks prior                  and four week post surgery.                     They have also been instructed not to take                                          the following medications the morning of their procedure on the Bariatric Surgery Instructions Sheet:                3) Dr Ryan/Surgeon recommends that this patient take the following two                   hours prior to their arrival time:                             A)   2 extra strength Tylenol (1000mg)                             B)    8 ounces (Only) of Sugar Free Gatorade, G2 or Powerade Zero                                   (no red or pink in color)                                       4) During Bootcamp our patient also met with the Outpatient Surgery Staff  for pre-surgery instructions.      Patient also received BA Surgery Post Follow up Appointments.     DATE@  15:00 CST

## 2023-12-09 LAB — BACTERIA SPEC AEROBE CULT: ABNORMAL

## 2023-12-13 ENCOUNTER — TELEPHONE (OUTPATIENT)
Dept: BARIATRICS/WEIGHT MGMT | Facility: CLINIC | Age: 38
End: 2023-12-13
Payer: COMMERCIAL

## 2023-12-13 NOTE — TELEPHONE ENCOUNTER
Patient about trying to reschedule her BA Sx. She would like to have it done before the end of the year. Dr Ryan' doesn't have any availability and the patient was informed. We would be glad to accommodate her in early January. She voiced an understanding.

## 2023-12-18 ENCOUNTER — TELEPHONE (OUTPATIENT)
Dept: BARIATRICS/WEIGHT MGMT | Facility: CLINIC | Age: 38
End: 2023-12-18
Payer: COMMERCIAL

## 2023-12-28 ENCOUNTER — E-VISIT (OUTPATIENT)
Dept: INTERNAL MEDICINE | Age: 38
End: 2023-12-28
Payer: COMMERCIAL

## 2023-12-28 DIAGNOSIS — J01.90 ACUTE SINUSITIS, RECURRENCE NOT SPECIFIED, UNSPECIFIED LOCATION: Primary | ICD-10-CM

## 2023-12-28 PROCEDURE — 99421 OL DIG E/M SVC 5-10 MIN: CPT | Performed by: INTERNAL MEDICINE

## 2023-12-28 RX ORDER — AMOXICILLIN AND CLAVULANATE POTASSIUM 875; 125 MG/1; MG/1
1 TABLET, FILM COATED ORAL 2 TIMES DAILY
Qty: 14 TABLET | Refills: 0 | Status: SHIPPED | OUTPATIENT
Start: 2023-12-28 | End: 2024-01-04

## 2023-12-28 RX ORDER — METHYLPREDNISOLONE 4 MG/1
TABLET ORAL
Qty: 1 KIT | Refills: 0 | Status: SHIPPED | OUTPATIENT
Start: 2023-12-28 | End: 2024-01-03

## 2023-12-28 RX ORDER — BENZONATATE 200 MG/1
200 CAPSULE ORAL 3 TIMES DAILY PRN
Qty: 30 CAPSULE | Refills: 0 | Status: SHIPPED | OUTPATIENT
Start: 2023-12-28

## 2023-12-28 ASSESSMENT — LIFESTYLE VARIABLES
SMOKING_YEARS: 20
PACKS_PER_DAY: 1
SMOKING_STATUS: NO, I'M A FORMER SMOKER

## 2023-12-28 NOTE — PROGRESS NOTES
Patient submitted an e-visit for what appears to be a sinus infection. She was advised to call Ms. Isabella Eisenmenger if her symptoms do not improve. Medrol Dosepak, Augmentin and Tessalon Perles sent to pharmacy.   About 5 minutes spent on e-visit    Electronically signed by Guanakito Munoz MD on 12/28/2023 at 11:17 AM

## 2024-01-10 RX ORDER — ERGOCALCIFEROL 1.25 MG/1
50000 CAPSULE ORAL
Qty: 24 CAPSULE | Refills: 1 | Status: SHIPPED | OUTPATIENT
Start: 2024-01-11

## 2024-01-10 NOTE — TELEPHONE ENCOUNTER
[facility] called requesting a refill of the below medication which has been pended for you:     Requested Prescriptions     Pending Prescriptions Disp Refills    vitamin D (ERGOCALCIFEROL) 1.25 MG (26467 UT) CAPS capsule [Pharmacy Med Name: VITAMIN D2 50,000IU (ERGO) CAP RX] 24 capsule 1     Sig: Take 1 capsule by mouth Twice a Week Monday and Thursday       Last Appointment Date: 8/4/2023  Next Appointment Date: 2/5/2024    Allergies   Allergen Reactions    Mangifera Indica Fruit Ext ([place]) [Mangifera Indica] Anaphylaxis    Mushroom Extract Complex Nausea And Vomiting

## 2024-01-17 ENCOUNTER — E-VISIT (OUTPATIENT)
Dept: INTERNAL MEDICINE | Age: 39
End: 2024-01-17
Payer: COMMERCIAL

## 2024-01-17 DIAGNOSIS — R30.0 DYSURIA: Primary | ICD-10-CM

## 2024-01-17 PROCEDURE — 99421 OL DIG E/M SVC 5-10 MIN: CPT | Performed by: INTERNAL MEDICINE

## 2024-01-18 RX ORDER — NITROFURANTOIN 25; 75 MG/1; MG/1
100 CAPSULE ORAL 2 TIMES DAILY
Qty: 20 CAPSULE | Refills: 0 | Status: SHIPPED | OUTPATIENT
Start: 2024-01-18 | End: 2024-01-28

## 2024-01-18 NOTE — PROGRESS NOTES
Patient submitted an e-visit for a UTI. Lab ordered. Macrobid sent to the pharmacy. About  minutes spent on e-visit    Electronically signed by Alyse Jean Baptiste MD on 1/18/2024 at 4:34 AM

## 2024-01-29 ENCOUNTER — OFFICE VISIT (OUTPATIENT)
Dept: BARIATRICS/WEIGHT MGMT | Facility: CLINIC | Age: 39
End: 2024-01-29
Payer: COMMERCIAL

## 2024-01-29 VITALS
SYSTOLIC BLOOD PRESSURE: 139 MMHG | BODY MASS INDEX: 43.02 KG/M2 | OXYGEN SATURATION: 98 % | DIASTOLIC BLOOD PRESSURE: 81 MMHG | HEIGHT: 65 IN | HEART RATE: 104 BPM | TEMPERATURE: 98 F | WEIGHT: 258.2 LBS

## 2024-01-29 DIAGNOSIS — E78.2 MIXED HYPERLIPIDEMIA: ICD-10-CM

## 2024-01-29 DIAGNOSIS — E66.01 CLASS 3 SEVERE OBESITY DUE TO EXCESS CALORIES WITH SERIOUS COMORBIDITY AND BODY MASS INDEX (BMI) OF 40.0 TO 44.9 IN ADULT: Primary | ICD-10-CM

## 2024-01-29 DIAGNOSIS — Z72.0 NICOTINE ABUSE: ICD-10-CM

## 2024-01-29 PROCEDURE — 99213 OFFICE O/P EST LOW 20 MIN: CPT | Performed by: SURGERY

## 2024-01-29 NOTE — PROGRESS NOTES
"Patient Care Team:  Shaunna Choudhury APRN as PCP - General (Otolaryngology)    Reason for Visit:  Surgical Weight loss    Subjective   Lottie Plunkett is a 38 y.o. female.     Lottie is here for follow-up and continued medical management of her morbid obesity.  She is currently on a prescription diet.  Lottie previously was to apply dietary changes such as following the meal plan as directed.  She admits to following the dietary prescription more consistently.  As a result she gained weight since her last visit.  However the patient has stopped the use of nicotine products.  She had a \"relapse\" in December with vaping.  She has since now implemented behavioral health counseling to help facilitate continued nicotine cessation as recommended and required by her program as well as implementing the dietary recommendations.    Review Of Systems:  General ROS: negative  Respiratory ROS: no cough, shortness of breath, or wheezing  Cardiovascular ROS: no chest pain or dyspnea on exertion  Gastrointestinal ROS: no abdominal pain, change in bowel habits, or black or bloody stools    The following portions of the patient's history were reviewed and updated as appropriate: allergies, current medications, past family history, past medical history, past social history, past surgical history, and problem list.    Objective   /81 (BP Location: Right arm, Patient Position: Sitting, Cuff Size: Adult)   Pulse 104   Temp 98 °F (36.7 °C)   Ht 165.1 cm (65\")   Wt 117 kg (258 lb 3.2 oz)   LMP 11/20/2023 (Exact Date)   SpO2 98%   BMI 42.97 kg/m²       01/29/24  1409   Weight: 117 kg (258 lb 3.2 oz)           General Appearance:  awake, alert, oriented, in no acute distress  Abdomen:  Soft, non-tender, normal bowel sounds; no bruits, organomegaly or masses.  Abnormal shape: obese      Assessment & Plan     Encounter Diagnoses   Name Primary?    Class 3 severe obesity due to excess calories with serious comorbidity " and body mass index (BMI) of 40.0 to 44.9 in adult Yes    Mixed hyperlipidemia     Nicotine abuse        Lottie Plunkett was seen today for follow-up, obesity, nutrition counseling and weight loss.  She has gained weight since her last visit.  This is more than likely due to the stress associated with nicotine cessation.  She is actually now seeing behavioral health services to help provide resources and tools to help with her stress relief and continued nicotine cessation.  A total of 20 minutes was spent face-to-face with this patient during this encounter and over half the time was spent on counseling coronation of care for morbid obesity and nicotine issues.  Today we discussed healthy changes in lifestyle, diet, and exercise. Dietician consultation obtained.  Lottie Plunkett had received handouts to her explaining the recommendation on portion sizes/appetite control/reading nutrition labels.   Intensive behavioral therapy for obesity was done today as well.   Goals for this month are: Continue to stop the use of nicotine products and implement behavioral changes and tools to help with stress relief.  The patient currently is doing a journal daily.    Follow up in one month for a weight recheck and progress with her stress levels and behavioral health services.

## 2024-01-31 RX ORDER — SIMVASTATIN 10 MG
10 TABLET ORAL NIGHTLY
Qty: 30 TABLET | Refills: 0 | Status: SHIPPED | OUTPATIENT
Start: 2024-01-31

## 2024-01-31 NOTE — TELEPHONE ENCOUNTER
[facility] called requesting a refill of the below medication which has been pended for you:     Requested Prescriptions     Pending Prescriptions Disp Refills    simvastatin (ZOCOR) 10 MG tablet [Pharmacy Med Name: SIMVASTATIN 10MG TABLETS] 30 tablet 0     Sig: TAKE 1 TABLET BY MOUTH EVERY NIGHT       Last Appointment Date: 8/4/2023  Next Appointment Date: 2/5/2024    Allergies   Allergen Reactions    Mangifera Indica Fruit Ext ([place]) [Mangifera Indica] Anaphylaxis    Mushroom Extract Complex Nausea And Vomiting

## 2024-02-01 RX ORDER — SIMVASTATIN 10 MG
10 TABLET ORAL NIGHTLY
Qty: 90 TABLET | OUTPATIENT
Start: 2024-02-01

## 2024-02-08 RX ORDER — SIMVASTATIN 10 MG
10 TABLET ORAL NIGHTLY
Qty: 90 TABLET | OUTPATIENT
Start: 2024-02-08

## 2024-02-23 ENCOUNTER — NUTRITION (OUTPATIENT)
Dept: BARIATRICS/WEIGHT MGMT | Facility: CLINIC | Age: 39
End: 2024-02-23
Payer: COMMERCIAL

## 2024-02-23 VITALS — BODY MASS INDEX: 41.55 KG/M2 | HEIGHT: 65 IN | WEIGHT: 249.4 LBS

## 2024-02-23 DIAGNOSIS — E66.01 CLASS 3 SEVERE OBESITY DUE TO EXCESS CALORIES WITH SERIOUS COMORBIDITY AND BODY MASS INDEX (BMI) OF 40.0 TO 44.9 IN ADULT: Primary | ICD-10-CM

## 2024-02-23 RX ORDER — BUSPIRONE HYDROCHLORIDE 15 MG/1
15 TABLET ORAL
COMMUNITY
Start: 2023-10-02

## 2024-02-23 NOTE — PROGRESS NOTES
"Metabolic and Bariatric Surgery Adult Nutrition Assessment    Patient Name: Lottie Plunkett   YOB: 1985   MRN: [id number]     Assessment Date:  2024     Reason for Visit: Follow-up Nutrition Assessment     Treatment Pathway: Preoperative Bariatric Surgery, Visit 6    Assessment    Anthropometrics   Wt Readings from Last 1 Encounters:   24 113 kg (249 lb 6.4 oz)     Ht Readings from Last 1 Encounters:   24 165.1 cm (65\")     BMI Readings from Last 1 Encounters:   24 41.50 kg/m²        Initial Weight/Date: 247.6 lbs (Aug 2023)  Weight Changes since last visit: -8.8 lbs  Net Weight Change: +1.8 lbs    Past Medical History:   Diagnosis Date    Allergic rhinitis     Anxiety     Arthritis     Asthma     Chronic pain disorder     TWO BACK SURGERY'S    H/O streptococcal infection     Hyperlipidemia     Neuropathy of leg     Right leg    Obesity     Personal history of COVID-19 2022    Personal history of COVID-19 2023    Sleep apnea     does not wear machine      Past Surgical History:   Procedure Laterality Date     SECTION  2003    DILATION AND CURETTAGE, DIAGNOSTIC / THERAPEUTIC      ENDOSCOPY N/A 2023    Procedure: ESOPHAGOGASTRODUODENOSCOPY WITH ANESTHESIA;  Surgeon: Adrian Ryan MD;  Location: [facility] ENDOSCOPY;  Service: General;  Laterality: N/A;  pre op:   post op:normal    SPINE SURGERY      Lumbar Laminectomy (13), Lumbar Disc Replacement (2016)      Current Outpatient Medications   Medication Sig Dispense Refill    albuterol (PROVENTIL HFA;VENTOLIN HFA) 108 (90 Base) MCG/ACT inhaler Inhale 2 puffs Every 4 (Four) Hours As Needed for Wheezing or Shortness of Air. 1 inhaler 0    busPIRone (BUSPAR) 15 MG tablet Take 1 tablet by mouth.      gabapentin (NEURONTIN) 300 MG capsule Take 1 capsule by mouth At Night As Needed (pain).      hydrOXYzine pamoate (VISTARIL) 25 MG capsule Take 1 capsule by mouth Every Night.      ibuprofen " (ADVIL,MOTRIN) 800 MG tablet Take 1 tablet by mouth 2 (Two) Times a Day As Needed for Mild Pain or Fever.      simvastatin (ZOCOR) 10 MG tablet Take 1 tablet by mouth Every Night.      traMADol (ULTRAM) 50 MG tablet Take 1 tablet by mouth Every 6 (Six) Hours As Needed for Moderate Pain.      vitamin D (ERGOCALCIFEROL) 1.25 MG (63477 UT) capsule capsule Take 1 capsule by mouth 1 (One) Time Per Week.      BIOTIN PO Take 1 tablet by mouth Daily.      calcium citrate-vitamin d (CITRACAL) 200-250 MG-UNIT tablet tablet Take 2 tablets by mouth Daily.      Lactobacillus (DIGESTIVE HEALTH PROBIOTIC PO) Take 1 tablet by mouth Daily.      multivitamin with minerals tablet tablet Take 1 tablet by mouth Daily.      PREDNISONE PO Take  by mouth.       No current facility-administered medications for this visit.      Allergies   Allergen Reactions    Mangifera Indica Fruit Ext ([place]ar) [Mangifera Indica] Anaphylaxis    Mushroom Nausea And Vomiting          Motivation for weight loss includes: wants to not be winded when walking up steps. Wants to be overall healthier.     Pertinent Social/Behavior/Environmental History: is utilizing a mental health counselor through work. Has increased Buspar dose to help with sleeping.     Nutrition Recall  Eating 4 meals daily and snacks at night.   (M1) Frittata wrap. SF Crystal Light OJ (similar to Macdonald).   (M2) Spicy Rice. SF Crystal Light Punch   (M3) Ranch Chicken Salad (canned chicken, celery, lettuce, cottage cheese, homemade ranch seasoning- no added sugar). Least structured meal.   (M4) Cinnamon Shake (all shakes get 2 scoops powdered greens and fiber powder).   Snacking - hungry all the time. Significant insomnia so snacks more late at night- lately has been choosing spinach. 13 almonds as a snack. Sometimes carrots/side salad/brussels sprouts/SF jello/string cheese. Pickles.   Monitoring portions- is measuring portions.   Calculating Protein- estimates >65 grams daily.   Fluid Intake-  approximately 80 oz water. Also hot teas and coffee.       Success this Month: hasn't smoked since end of Nov. Feels in a really good mindset avoiding vape.   Barriers: Has major sickness while in [place] vomiting hard enough to create severe back pain. Causing need to take increase pain medications   Hours of sleep 1 am-6 am    Nutrition Intervention  Nutrition education for morbid obesity and nutrition coaching for behavior change provided.  Strategies used included Comprehensive education, Motivational Interviewing , Problem Solving, and Ongoing reinforcement  Review of medical weight loss prescription 4 meal/day plan and reviewed nutritional needs for Preoperative Bariatric Surgery, Visit 6 .  Self-monitoring strategies such as keeping a food journal (on paper or electronically) and calculating fluid/protein intake were discussed.    Recommended Diet Changes- Green Prescription Meal Plan  Eat 4 meals per day with protein and vegetables at each meal, no carbs after meal 2., Protein goal: 65 gms., Eat vegetables first at each meal., Discussed protein guidelines for shakes and bars., Reduce snacking -use foods from free foods list only., Reduce fat, sugar, and/or salt in food choices., Choose more nutrient dense foods., Choose foods with increased fiber., Monitor portion sizes using a food scale and/or measuring cup., Eliminate soda and sugar-sweetened beverages, and Increase fluid intake to 64 ounces per day      Goals  1. Have vegetable soup prior to laying down at night as a snack/free food. Homefully this will help her fell full and satisfied before bed to prevent late night food cravings/eating behaviors.   2. Utilize vegetables instead of powdered vegetable greens in protein shakes.     Monitoring/Evaluation Plan  Anticipate follow in 1 months. Continue collaboration of care with physician and treatment team.     Time Spent 20 minutes.    Electronically signed by  Mckenzie Walsh RDN, LD  02/23/2024 09:42  CST.

## 2024-03-29 ENCOUNTER — OFFICE VISIT (OUTPATIENT)
Dept: BARIATRICS/WEIGHT MGMT | Facility: CLINIC | Age: 39
End: 2024-03-29
Payer: COMMERCIAL

## 2024-03-29 ENCOUNTER — LAB (OUTPATIENT)
Dept: LAB | Facility: HOSPITAL | Age: 39
End: 2024-03-29
Payer: COMMERCIAL

## 2024-03-29 VITALS
OXYGEN SATURATION: 96 % | SYSTOLIC BLOOD PRESSURE: 131 MMHG | HEART RATE: 86 BPM | HEIGHT: 65 IN | DIASTOLIC BLOOD PRESSURE: 84 MMHG | TEMPERATURE: 98.6 F | BODY MASS INDEX: 40.59 KG/M2 | WEIGHT: 243.6 LBS

## 2024-03-29 DIAGNOSIS — E66.01 CLASS 3 SEVERE OBESITY DUE TO EXCESS CALORIES WITH SERIOUS COMORBIDITY AND BODY MASS INDEX (BMI) OF 40.0 TO 44.9 IN ADULT: ICD-10-CM

## 2024-03-29 DIAGNOSIS — Z72.0 NICOTINE ABUSE: ICD-10-CM

## 2024-03-29 DIAGNOSIS — E78.2 MIXED HYPERLIPIDEMIA: ICD-10-CM

## 2024-03-29 DIAGNOSIS — E66.01 CLASS 3 SEVERE OBESITY DUE TO EXCESS CALORIES WITHOUT SERIOUS COMORBIDITY WITH BODY MASS INDEX (BMI) OF 40.0 TO 44.9 IN ADULT: Primary | ICD-10-CM

## 2024-03-29 PROCEDURE — 36415 COLL VENOUS BLD VENIPUNCTURE: CPT

## 2024-03-29 PROCEDURE — G0480 DRUG TEST DEF 1-7 CLASSES: HCPCS

## 2024-03-29 NOTE — PROGRESS NOTES
"Patient Care Team:  Shaunna Choudhury APRN as PCP - General (Otolaryngology)    Reason for Visit:  Surgical Weight loss    Subjective   Lottie Plunkett is a 38 y.o. female.     Lottie is here for follow-up and continued medical management of her morbid obesity.  She is currently on a prescription diet.  Lottie previously was to apply dietary changes such as following the meal plan as directed.  Patient admits to eating around 5 meals per day.  She  admits to drinking at least 64 ounces or more of fluid each day and is unsure how many grams of protein she is intaking. She  is currently exercising daily by doing body weight workouts at home.  She  states that she has gone with soda intake- states it is sparkling water and denies  nicotine use.  Patient has lost 6  pounds since her last appointment with us.     Review Of Systems:  Review of Systems   Constitutional:  Positive for fatigue.   Respiratory: Negative.     Cardiovascular: Negative.    Gastrointestinal: Negative.    Endocrine: Negative.    Musculoskeletal:  Positive for arthralgias, back pain and myalgias.   Psychiatric/Behavioral:  Positive for sleep disturbance.          The following portions of the patient's history were reviewed and updated as appropriate: allergies, current medications, past family history, past medical history, past social history, past surgical history, and problem list.    Objective   /84 (BP Location: Right arm, Patient Position: Sitting, Cuff Size: Adult)   Pulse 86   Temp 98.6 °F (37 °C)   Ht 165.1 cm (65\")   Wt 110 kg (243 lb 9.6 oz)   LMP 11/20/2023 (Exact Date)   SpO2 96%   BMI 40.54 kg/m²       03/29/24  0824   Weight: 110 kg (243 lb 9.6 oz)            Physical Exam  Vitals reviewed.   Constitutional:       Appearance: She is obese.   Cardiovascular:      Rate and Rhythm: Normal rate and regular rhythm.   Pulmonary:      Effort: Pulmonary effort is normal.   Abdominal:      General: Bowel sounds are " normal.      Palpations: Abdomen is soft.   Musculoskeletal:         General: Normal range of motion.   Skin:     General: Skin is warm and dry.   Neurological:      Mental Status: She is alert and oriented to person, place, and time.   Psychiatric:         Mood and Affect: Mood normal.         Behavior: Behavior normal.           Assessment & Plan   Diagnoses and all orders for this visit:    1. Class 3 severe obesity due to excess calories without serious comorbidity with body mass index (BMI) of 40.0 to 44.9 in adult (Primary)  Assessment & Plan:  Patient's (Body mass index is 40.54 kg/m².) indicates that they are morbidly/severely obese (BMI > 40 or > 35 with obesity - related health condition) with health conditions that include dyslipidemias . Weight is unchanged. BMI  is above average; BMI management plan is completed. We discussed portion control and increasing exercise.       2. Mixed hyperlipidemia  Assessment & Plan:    Nutritional counseling provided            Lottie Plunkett was seen today for follow-up, obesity, nutrition counseling and weight loss.    Today we discussed healthy changes in lifestyle, diet, and exercise. Dietician consultation obtained.  Lottie Plunkett had received handouts to her explaining the recommendation on portion sizes/appetite control/reading nutrition labels.   Intensive behavioral therapy for obesity was done today as well.     Goals for this month are:   Work towards calculating protein intake   Take nicotine test today, she states it has been greater than 3 months since she last used nicotine   Anxiety and stress reduction is much improved per patient.     Follow up in 1 month for a weight recheck.    Patient will continue GREEN meal plan.

## 2024-03-29 NOTE — ASSESSMENT & PLAN NOTE
Patient's (Body mass index is 40.54 kg/m².) indicates that they are morbidly/severely obese (BMI > 40 or > 35 with obesity - related health condition) with health conditions that include dyslipidemias . Weight is unchanged. BMI  is above average; BMI management plan is completed. We discussed portion control and increasing exercise.

## 2024-04-01 ENCOUNTER — OFFICE VISIT (OUTPATIENT)
Dept: BARIATRICS/WEIGHT MGMT | Facility: CLINIC | Age: 39
End: 2024-04-01
Payer: COMMERCIAL

## 2024-04-01 VITALS
BODY MASS INDEX: 41.25 KG/M2 | OXYGEN SATURATION: 98 % | TEMPERATURE: 98 F | DIASTOLIC BLOOD PRESSURE: 83 MMHG | SYSTOLIC BLOOD PRESSURE: 162 MMHG | HEART RATE: 104 BPM | WEIGHT: 247.6 LBS | HEIGHT: 65 IN

## 2024-04-01 DIAGNOSIS — E78.2 MIXED HYPERLIPIDEMIA: ICD-10-CM

## 2024-04-01 DIAGNOSIS — Z72.0 NICOTINE ABUSE: ICD-10-CM

## 2024-04-01 DIAGNOSIS — E66.01 CLASS 3 SEVERE OBESITY DUE TO EXCESS CALORIES WITHOUT SERIOUS COMORBIDITY WITH BODY MASS INDEX (BMI) OF 40.0 TO 44.9 IN ADULT: Primary | ICD-10-CM

## 2024-04-01 PROCEDURE — 99214 OFFICE O/P EST MOD 30 MIN: CPT | Performed by: SURGERY

## 2024-04-01 RX ORDER — ENOXAPARIN SODIUM 100 MG/ML
40 INJECTION SUBCUTANEOUS ONCE
OUTPATIENT
Start: 2024-04-01 | End: 2024-04-01

## 2024-04-01 RX ORDER — ONDANSETRON 2 MG/ML
4 INJECTION INTRAMUSCULAR; INTRAVENOUS EVERY 6 HOURS PRN
OUTPATIENT
Start: 2024-04-01

## 2024-04-01 RX ORDER — SCOLOPAMINE TRANSDERMAL SYSTEM 1 MG/1
1 PATCH, EXTENDED RELEASE TRANSDERMAL CONTINUOUS
OUTPATIENT
Start: 2024-04-01 | End: 2024-04-04

## 2024-04-01 RX ORDER — GABAPENTIN 250 MG/5ML
250 SOLUTION ORAL ONCE
OUTPATIENT
Start: 2024-04-01 | End: 2024-04-01

## 2024-04-01 NOTE — PROGRESS NOTES
"Patient Care Team:  Shaunna Choudhury APRN as PCP - General (Otolaryngology)    Reason for Visit:  Surgical Weight loss    Subjective   Lottie Plunkett is a 38 y.o. female.     Lottie is here for follow-up and continued medical management of her morbid obesity.  She is currently on a prescription diet.  Lottie previously was to apply dietary changes such as following the meal plan as directed.  She admits to the use of nicotine products.  As a result she gained weight since her last visit.  She is following a dietary prescription more consistently and eating 4 meals a day.    Review Of Systems:  General ROS: negative  Respiratory ROS: no cough, shortness of breath, or wheezing  Cardiovascular ROS: no chest pain or dyspnea on exertion  Gastrointestinal ROS: no abdominal pain, change in bowel habits, or black or bloody stools    The following portions of the patient's history were reviewed and updated as appropriate: allergies, current medications, past family history, past medical history, past social history, past surgical history, and problem list.    Objective   /83 (BP Location: Right arm, Patient Position: Sitting, Cuff Size: Adult)   Pulse 104   Temp 98 °F (36.7 °C)   Ht 165.1 cm (65\")   Wt 112 kg (247 lb 9.6 oz)   LMP 11/20/2023 (Exact Date)   SpO2 98%   BMI 41.20 kg/m²       04/01/24  1102   Weight: 112 kg (247 lb 9.6 oz)           General Appearance:  awake, alert, oriented, in no acute distress  Lungs:  Normal expansion.  Clear to auscultation.  No rales, rhonchi, or wheezing.  Heart:  Heart regular rate and rhythm  Abdomen:  Soft, non-tender, normal bowel sounds; no bruits, organomegaly or masses.  Abnormal shape: obese      Assessment & Plan     Encounter Diagnoses   Name Primary?    Class 3 severe obesity due to excess calories without serious comorbidity with body mass index (BMI) of 40.0 to 44.9 in adult Yes    Mixed hyperlipidemia     Nicotine abuse        I believe this patient " will be a good candidate for weight loss surgery.    She has chosen laparoscopic sleeve gastrectomy. I agree with this decision.  I have discussed the Keith - Y Gastric Bypass, laparoscopic sleeve gastrectomy and the Laparoscopic Gastric Band procedures to provide the alternatives which includes non surgical weight loss options as well.  We discussed the benefits of the surgeries including the benefit of weight loss and the possible reversal of co-morbid conditions associated with morbid obesity.  She is aware that the Sleeve procedure is not reversible.  We discussed the complications and risks which include the risk of perforation, leakage,bleeding, intra-abdominal organ injury, specifically at high risks of the liver and spleen, stenosis or ulcerations, the risk of venous thrombosis formation in the lungs, mesentery veins or lower extremities  leading to possible organ injury and death.  I explained to the patient that there is a risk of infection.  I explained to her the possibility that this procedure may not be performed laparoscopically and may require being converted to an opened procedure or aborted due to abnormal anatomy.  Also postoperatively there is a risk of increased GERD symptoms after this procedure.  I have explained if she develops intestinal metaplasia of her esophagus consideration for conversion to another weight loss procedure may be necessary.    I have explained to the patient that depression, addictive behaviors and even an increase in suicidal emotions can occur after surgery and even though they have undergone a mental health evaluation through psychology/psychiatry or by a registered therapist she may require additional evaluation and treatment in the future.  Nicotine cessation needs to continue even after their surgical procedure and nicotine products should be avoided due to the side effects of these products.  I explained to Lottie Plunkett not to plan for pregnancy within 12  to 18 months of her procedure.  I explained to her postoperatively she should avoid having unprotected sex that would increase her risk of pregnancy during the postoperative period of 1 to 1-1/2 years.   she  was explained the importance of maintaining long-term nutritional supplementation such as multivitamins due to the nature of weight loss surgeries and the potential of malnutrition associated with the surgical procedures.      I have reviewed with the patient her 30-day mortality risk using the ACS Surgical Risk Calculator to be less than 0.05%.    I explained to the patient that the success of the surgery is directly related to the motivation and dedication to behavioral changes that they have learned during their preoperative course.  There is data that shows approximately 10% of patients may have satisfactory weight loss or regain their weight they have lost after surgery.  It is more likely due to returning to the previous behaviors they incorporated during their disease of obesity.  I also encourage Lottie Plunkett to incorporate exercise again after surgery weight restrictions have been removed postoperatively.    Karol Report   As part of this patient's treatment plan I am prescribing controlled substances.  We review Karol in our educational course.  The patient has been made aware of appropriate use of such medications, including potential risk of somnolence, limited ability to drive and /or work safely, and potential for dependence or overdose. It has also been made clear that these medications are for use by this patient only, without concomitant use of alcohol or other substances unless prescribed.    Lottie Plunkett will be required to complete the educational preoperative class detailing terms of the preoperative and postoperative recommendations, risks of surgery, the monitoring of the patient's KAROL reports if necessary. Lottie Plunkett is aware that inappropriate use of  prescribed medications will result in cessation of prescribing such medications.    KAROL report has been reviewed.      History and physical exam exhibit continued safe and appropriate use of controlled substances.       Lottie Plunkett understands the surgical procedures and the different surgical options that are available.   Lottie Plunkett understands the lifestyle changes that are required after surgery and has agreed to follow the guidelines outlined in the weight management program. Lottie Plunkett also expressed understanding of the risks involved and had all of her questions answered and desires to proceed.    Upon completion of our discussion and addressing and answering her questions to her satisfaction, informed consent was obtained.   She will be scheduled accordingly for a laparoscopic Sleeve gastrectomy procedure.

## 2024-04-07 LAB
COTININE SERPL-MCNC: <1 NG/ML
NICOTINE SERPL-MCNC: <1 NG/ML

## 2024-04-19 ENCOUNTER — TREATMENT (OUTPATIENT)
Dept: BARIATRICS/WEIGHT MGMT | Facility: CLINIC | Age: 39
End: 2024-04-19
Payer: COMMERCIAL

## 2024-04-19 ENCOUNTER — LAB (OUTPATIENT)
Dept: LAB | Facility: HOSPITAL | Age: 39
End: 2024-04-19
Payer: COMMERCIAL

## 2024-04-19 VITALS — BODY MASS INDEX: 40.69 KG/M2 | WEIGHT: 244.2 LBS | HEIGHT: 65 IN

## 2024-04-19 DIAGNOSIS — E66.01 CLASS 3 SEVERE OBESITY DUE TO EXCESS CALORIES WITHOUT SERIOUS COMORBIDITY WITH BODY MASS INDEX (BMI) OF 40.0 TO 44.9 IN ADULT: ICD-10-CM

## 2024-04-19 LAB
ALBUMIN SERPL-MCNC: 4.2 G/DL (ref 3.5–5.2)
ALBUMIN/GLOB SERPL: 1.1 G/DL
ALP SERPL-CCNC: 77 U/L (ref 39–117)
ALT SERPL W P-5'-P-CCNC: 57 U/L (ref 1–33)
ANION GAP SERPL CALCULATED.3IONS-SCNC: 10 MMOL/L (ref 5–15)
APTT PPP: 31 SECONDS (ref 24.5–36)
AST SERPL-CCNC: 31 U/L (ref 1–32)
BACTERIA UR QL AUTO: ABNORMAL /HPF
BILIRUB SERPL-MCNC: 0.3 MG/DL (ref 0–1.2)
BILIRUB UR QL STRIP: NEGATIVE
BUN SERPL-MCNC: 16 MG/DL (ref 6–20)
BUN/CREAT SERPL: 26.7 (ref 7–25)
CALCIUM SPEC-SCNC: 9.5 MG/DL (ref 8.6–10.5)
CHLORIDE SERPL-SCNC: 99 MMOL/L (ref 98–107)
CLARITY UR: CLEAR
CO2 SERPL-SCNC: 26 MMOL/L (ref 22–29)
COLOR UR: YELLOW
CREAT SERPL-MCNC: 0.6 MG/DL (ref 0.57–1)
DEPRECATED RDW RBC AUTO: 43.2 FL (ref 37–54)
EGFRCR SERPLBLD CKD-EPI 2021: 117.3 ML/MIN/1.73
ERYTHROCYTE [DISTWIDTH] IN BLOOD BY AUTOMATED COUNT: 13.5 % (ref 12.3–15.4)
GLOBULIN UR ELPH-MCNC: 3.7 GM/DL
GLUCOSE SERPL-MCNC: 119 MG/DL (ref 65–99)
GLUCOSE UR STRIP-MCNC: NEGATIVE MG/DL
HBA1C MFR BLD: 5.9 % (ref 4.8–5.6)
HCT VFR BLD AUTO: 42.2 % (ref 34–46.6)
HGB BLD-MCNC: 13.8 G/DL (ref 12–15.9)
HGB UR QL STRIP.AUTO: ABNORMAL
HYALINE CASTS UR QL AUTO: ABNORMAL /LPF
INR PPP: 0.93 (ref 0.91–1.09)
KETONES UR QL STRIP: NEGATIVE
LEUKOCYTE ESTERASE UR QL STRIP.AUTO: NEGATIVE
MCH RBC QN AUTO: 28.7 PG (ref 26.6–33)
MCHC RBC AUTO-ENTMCNC: 32.7 G/DL (ref 31.5–35.7)
MCV RBC AUTO: 87.7 FL (ref 79–97)
NITRITE UR QL STRIP: NEGATIVE
PH UR STRIP.AUTO: 5.5 [PH] (ref 5–8)
PLATELET # BLD AUTO: 347 10*3/MM3 (ref 140–450)
PMV BLD AUTO: 9.3 FL (ref 6–12)
POTASSIUM SERPL-SCNC: 4.2 MMOL/L (ref 3.5–5.2)
PROT SERPL-MCNC: 7.9 G/DL (ref 6–8.5)
PROT UR QL STRIP: NEGATIVE
PROTHROMBIN TIME: 12.8 SECONDS (ref 11.8–14.8)
RBC # BLD AUTO: 4.81 10*6/MM3 (ref 3.77–5.28)
RBC # UR STRIP: ABNORMAL /HPF
REF LAB TEST METHOD: ABNORMAL
SODIUM SERPL-SCNC: 135 MMOL/L (ref 136–145)
SP GR UR STRIP: 1.02 (ref 1–1.03)
SQUAMOUS #/AREA URNS HPF: ABNORMAL /HPF
UROBILINOGEN UR QL STRIP: ABNORMAL
WBC # UR STRIP: ABNORMAL /HPF
WBC NRBC COR # BLD AUTO: 6.86 10*3/MM3 (ref 3.4–10.8)

## 2024-04-19 PROCEDURE — 85610 PROTHROMBIN TIME: CPT

## 2024-04-19 PROCEDURE — 80053 COMPREHEN METABOLIC PANEL: CPT

## 2024-04-19 PROCEDURE — 85730 THROMBOPLASTIN TIME PARTIAL: CPT

## 2024-04-19 PROCEDURE — 85027 COMPLETE CBC AUTOMATED: CPT

## 2024-04-19 PROCEDURE — 81001 URINALYSIS AUTO W/SCOPE: CPT

## 2024-04-19 PROCEDURE — 36415 COLL VENOUS BLD VENIPUNCTURE: CPT

## 2024-04-19 PROCEDURE — 83036 HEMOGLOBIN GLYCOSYLATED A1C: CPT

## 2024-04-19 NOTE — PROGRESS NOTES
Pre Sx Education Class   Information and Education Class for Pre and Post     Surgery Care, Diets and Daily Living.       Surgery Type: Sleeve Gastrectomy Consent on File    Height: 5'5  Weight: 244.2  BMI: 40.64      Diet Stages Form given including diet stages and surgery date/time     Weight Loss Surgery Patient Contract on File      Patient has signed/agreed with the Diet Stage & Medication discontinue sheet:       1) Medications have been review by Dr Ryan.      2) Patient informed to stop NSAID'S one week prior to surgery.                         If the patient is taking Metformin he/she will need to discontinue                   two weeks prior to surgery.                      Birth control Medications are to be discontinued two weeks prior                  and restart four weeks post surgery.                     They have also been instructed not to take the following medications the morning    of their procedure on the Bariatric Surgery Instructions Sheet: Voltaren, motrin, Erocalciferol, Buspar, Citracal, vistaril, multivitamin      3) Dr Ryan/Surgeon recommends that this patient take the following two        hours prior to their arrival time:         A.     Medications as directed by surgery staff at Pre Sx Class    B.     2 extra strength Tylenol (1000mg)                           C.     8 ounces (Only) of Sugar Free Gatorade, G2 or Powerade Zero                                   (no red or pink in color)                             4) During Bootcamp our patient also met with the Outpatient Surgery Staff  for pre-surgery instructions.          Patient also received [facility] Surgery Post Follow up Appointments.

## 2024-04-23 ENCOUNTER — ANESTHESIA EVENT (OUTPATIENT)
Dept: PERIOP | Facility: HOSPITAL | Age: 39
End: 2024-04-23
Payer: COMMERCIAL

## 2024-04-24 ENCOUNTER — ANESTHESIA (OUTPATIENT)
Dept: PERIOP | Facility: HOSPITAL | Age: 39
End: 2024-04-24
Payer: COMMERCIAL

## 2024-04-24 ENCOUNTER — HOSPITAL ENCOUNTER (INPATIENT)
Facility: HOSPITAL | Age: 39
LOS: 1 days | Discharge: HOME | End: 2024-04-25
Attending: SURGERY | Admitting: SURGERY
Payer: COMMERCIAL

## 2024-04-24 DIAGNOSIS — E66.01 CLASS 3 SEVERE OBESITY DUE TO EXCESS CALORIES WITHOUT SERIOUS COMORBIDITY WITH BODY MASS INDEX (BMI) OF 40.0 TO 44.9 IN ADULT: ICD-10-CM

## 2024-04-24 DIAGNOSIS — E78.2 MIXED HYPERLIPIDEMIA: ICD-10-CM

## 2024-04-24 DIAGNOSIS — Z72.0 NICOTINE ABUSE: ICD-10-CM

## 2024-04-24 DIAGNOSIS — Z98.84 STATUS POST LAPAROSCOPIC SLEEVE GASTRECTOMY: Primary | ICD-10-CM

## 2024-04-24 LAB
ABO GROUP BLD: NORMAL
BLD GP AB SCN SERPL QL: NEGATIVE
HCG SERPL QL: NEGATIVE
RH BLD: POSITIVE
T&S EXPIRATION DATE: NORMAL

## 2024-04-24 PROCEDURE — 25010000002 METOCLOPRAMIDE PER 10 MG: Performed by: SURGERY

## 2024-04-24 PROCEDURE — 25010000002 ONDANSETRON PER 1 MG: Performed by: NURSE ANESTHETIST, CERTIFIED REGISTERED

## 2024-04-24 PROCEDURE — 43775 LAP SLEEVE GASTRECTOMY: CPT | Performed by: SURGERY

## 2024-04-24 PROCEDURE — 25010000002 KETOROLAC TROMETHAMINE PER 15 MG: Performed by: SURGERY

## 2024-04-24 PROCEDURE — 84703 CHORIONIC GONADOTROPIN ASSAY: CPT | Performed by: SURGERY

## 2024-04-24 PROCEDURE — 25810000003 LACTATED RINGERS SOLUTION: Performed by: SURGERY

## 2024-04-24 PROCEDURE — 25010000002 LIDOCAINE 1 % SOLUTION 20 ML VIAL: Performed by: SURGERY

## 2024-04-24 PROCEDURE — 25010000002 ENOXAPARIN PER 10 MG: Performed by: SURGERY

## 2024-04-24 PROCEDURE — 25010000002 MORPHINE SULFATE (PF) 2 MG/ML SOLUTION 1 ML CARTRIDGE: Performed by: SURGERY

## 2024-04-24 PROCEDURE — 25010000002 FENTANYL CITRATE (PF) 100 MCG/2ML SOLUTION: Performed by: NURSE ANESTHETIST, CERTIFIED REGISTERED

## 2024-04-24 PROCEDURE — 25010000002 HYDROMORPHONE PER 4 MG: Performed by: SURGERY

## 2024-04-24 PROCEDURE — 25010000002 HYDROMORPHONE PER 4 MG: Performed by: ANESTHESIOLOGY

## 2024-04-24 PROCEDURE — 25010000002 THIAMINE PER 100 MG: Performed by: SURGERY

## 2024-04-24 PROCEDURE — 25010000002 DEXAMETHASONE PER 1 MG: Performed by: NURSE ANESTHETIST, CERTIFIED REGISTERED

## 2024-04-24 PROCEDURE — 25810000003 LACTATED RINGERS PER 1000 ML: Performed by: SURGERY

## 2024-04-24 PROCEDURE — 25010000002 BUPIVACAINE (PF) 0.5 % SOLUTION 30 ML VIAL: Performed by: SURGERY

## 2024-04-24 PROCEDURE — 25010000002 KETOROLAC TROMETHAMINE PER 15 MG: Performed by: NURSE ANESTHETIST, CERTIFIED REGISTERED

## 2024-04-24 PROCEDURE — 25010000002 DEXAMETHASONE PER 1 MG: Performed by: ANESTHESIOLOGY

## 2024-04-24 PROCEDURE — 25010000002 ONDANSETRON PER 1 MG: Performed by: SURGERY

## 2024-04-24 PROCEDURE — C1889 IMPLANT/INSERT DEVICE, NOC: HCPCS | Performed by: SURGERY

## 2024-04-24 PROCEDURE — 25010000002 SUGAMMADEX 200 MG/2ML SOLUTION: Performed by: NURSE ANESTHETIST, CERTIFIED REGISTERED

## 2024-04-24 PROCEDURE — 25010000002 PROPOFOL 10 MG/ML EMULSION: Performed by: NURSE ANESTHETIST, CERTIFIED REGISTERED

## 2024-04-24 PROCEDURE — 25010000002 DEXAMETHASONE PER 1 MG: Performed by: SURGERY

## 2024-04-24 PROCEDURE — 25010000002 CEFAZOLIN PER 500 MG: Performed by: SURGERY

## 2024-04-24 PROCEDURE — 25010000002 FENTANYL CITRATE (PF) 50 MCG/ML SOLUTION: Performed by: ANESTHESIOLOGY

## 2024-04-24 PROCEDURE — 25010000002 HYDROMORPHONE 1 MG/ML SOLUTION: Performed by: NURSE ANESTHETIST, CERTIFIED REGISTERED

## 2024-04-24 PROCEDURE — 88307 TISSUE EXAM BY PATHOLOGIST: CPT | Performed by: SURGERY

## 2024-04-24 PROCEDURE — 25010000002 BUPIVACAINE 0.5 % SOLUTION 50 ML VIAL: Performed by: SURGERY

## 2024-04-24 PROCEDURE — 0DB64Z3 EXCISION OF STOMACH, PERCUTANEOUS ENDOSCOPIC APPROACH, VERTICAL: ICD-10-PCS | Performed by: SURGERY

## 2024-04-24 PROCEDURE — S2900 ROBOTIC SURGICAL SYSTEM: HCPCS | Performed by: SURGERY

## 2024-04-24 DEVICE — STAPLER 60 RELOAD WHITE
Type: IMPLANTABLE DEVICE | Site: ABDOMEN | Status: FUNCTIONAL
Brand: SUREFORM

## 2024-04-24 DEVICE — HEMOST ABS SURGICEL SNOW 4X4IN: Type: IMPLANTABLE DEVICE | Site: ABDOMEN | Status: FUNCTIONAL

## 2024-04-24 DEVICE — ECHELON 60MM REINFORCEMENT
Type: IMPLANTABLE DEVICE | Site: ABDOMEN | Status: FUNCTIONAL
Brand: ECHLEON

## 2024-04-24 DEVICE — STAPLER 60 RELOAD BLUE
Type: IMPLANTABLE DEVICE | Site: ABDOMEN | Status: FUNCTIONAL
Brand: SUREFORM

## 2024-04-24 RX ORDER — LABETALOL HYDROCHLORIDE 5 MG/ML
5 INJECTION, SOLUTION INTRAVENOUS
Status: DISCONTINUED | OUTPATIENT
Start: 2024-04-24 | End: 2024-04-24 | Stop reason: HOSPADM

## 2024-04-24 RX ORDER — LIDOCAINE HYDROCHLORIDE 10 MG/ML
0.5 INJECTION, SOLUTION EPIDURAL; INFILTRATION; INTRACAUDAL; PERINEURAL ONCE AS NEEDED
Status: DISCONTINUED | OUTPATIENT
Start: 2024-04-24 | End: 2024-04-24 | Stop reason: HOSPADM

## 2024-04-24 RX ORDER — DEXTROSE MONOHYDRATE 25 G/50ML
12.5 INJECTION, SOLUTION INTRAVENOUS AS NEEDED
Status: DISCONTINUED | OUTPATIENT
Start: 2024-04-24 | End: 2024-04-24 | Stop reason: HOSPADM

## 2024-04-24 RX ORDER — DEXAMETHASONE SODIUM PHOSPHATE 4 MG/ML
4 INJECTION, SOLUTION INTRA-ARTICULAR; INTRALESIONAL; INTRAMUSCULAR; INTRAVENOUS; SOFT TISSUE EVERY 8 HOURS PRN
Status: DISCONTINUED | OUTPATIENT
Start: 2024-04-24 | End: 2024-04-25 | Stop reason: HOSPADM

## 2024-04-24 RX ORDER — FENTANYL CITRATE 50 UG/ML
25 INJECTION, SOLUTION INTRAMUSCULAR; INTRAVENOUS
Status: DISCONTINUED | OUTPATIENT
Start: 2024-04-24 | End: 2024-04-24 | Stop reason: HOSPADM

## 2024-04-24 RX ORDER — NALOXONE HCL 0.4 MG/ML
0.04 VIAL (ML) INJECTION AS NEEDED
Status: DISCONTINUED | OUTPATIENT
Start: 2024-04-24 | End: 2024-04-24 | Stop reason: HOSPADM

## 2024-04-24 RX ORDER — LIDOCAINE HYDROCHLORIDE 20 MG/ML
INJECTION, SOLUTION EPIDURAL; INFILTRATION; INTRACAUDAL; PERINEURAL AS NEEDED
Status: DISCONTINUED | OUTPATIENT
Start: 2024-04-24 | End: 2024-04-24 | Stop reason: SURG

## 2024-04-24 RX ORDER — HYDROMORPHONE HYDROCHLORIDE 1 MG/ML
0.5 INJECTION, SOLUTION INTRAMUSCULAR; INTRAVENOUS; SUBCUTANEOUS
Status: DISCONTINUED | OUTPATIENT
Start: 2024-04-24 | End: 2024-04-25

## 2024-04-24 RX ORDER — ACETAMINOPHEN 160 MG/5ML
325 SOLUTION ORAL EVERY 8 HOURS
Status: DISCONTINUED | OUTPATIENT
Start: 2024-04-24 | End: 2024-04-25 | Stop reason: HOSPADM

## 2024-04-24 RX ORDER — SODIUM CHLORIDE, SODIUM LACTATE, POTASSIUM CHLORIDE, CALCIUM CHLORIDE 600; 310; 30; 20 MG/100ML; MG/100ML; MG/100ML; MG/100ML
1000 INJECTION, SOLUTION INTRAVENOUS CONTINUOUS
Status: DISCONTINUED | OUTPATIENT
Start: 2024-04-24 | End: 2024-04-24

## 2024-04-24 RX ORDER — SODIUM CHLORIDE, SODIUM LACTATE, POTASSIUM CHLORIDE, CALCIUM CHLORIDE 600; 310; 30; 20 MG/100ML; MG/100ML; MG/100ML; MG/100ML
140 INJECTION, SOLUTION INTRAVENOUS CONTINUOUS
Status: DISCONTINUED | OUTPATIENT
Start: 2024-04-24 | End: 2024-04-25 | Stop reason: HOSPADM

## 2024-04-24 RX ORDER — SODIUM CHLORIDE, SODIUM LACTATE, POTASSIUM CHLORIDE, CALCIUM CHLORIDE 600; 310; 30; 20 MG/100ML; MG/100ML; MG/100ML; MG/100ML
9 INJECTION, SOLUTION INTRAVENOUS CONTINUOUS
Status: DISCONTINUED | OUTPATIENT
Start: 2024-04-24 | End: 2024-04-24

## 2024-04-24 RX ORDER — NALOXONE HCL 0.4 MG/ML
0.1 VIAL (ML) INJECTION
Status: DISCONTINUED | OUTPATIENT
Start: 2024-04-24 | End: 2024-04-25 | Stop reason: HOSPADM

## 2024-04-24 RX ORDER — DEXAMETHASONE SODIUM PHOSPHATE 4 MG/ML
4 INJECTION, SOLUTION INTRA-ARTICULAR; INTRALESIONAL; INTRAMUSCULAR; INTRAVENOUS; SOFT TISSUE ONCE AS NEEDED
Status: COMPLETED | OUTPATIENT
Start: 2024-04-24 | End: 2024-04-24

## 2024-04-24 RX ORDER — LABETALOL HYDROCHLORIDE 5 MG/ML
10 INJECTION, SOLUTION INTRAVENOUS
Status: DISCONTINUED | OUTPATIENT
Start: 2024-04-24 | End: 2024-04-25 | Stop reason: HOSPADM

## 2024-04-24 RX ORDER — FLUMAZENIL 0.1 MG/ML
0.2 INJECTION INTRAVENOUS AS NEEDED
Status: DISCONTINUED | OUTPATIENT
Start: 2024-04-24 | End: 2024-04-24 | Stop reason: HOSPADM

## 2024-04-24 RX ORDER — DEXAMETHASONE SODIUM PHOSPHATE 4 MG/ML
INJECTION, SOLUTION INTRA-ARTICULAR; INTRALESIONAL; INTRAMUSCULAR; INTRAVENOUS; SOFT TISSUE AS NEEDED
Status: DISCONTINUED | OUTPATIENT
Start: 2024-04-24 | End: 2024-04-24 | Stop reason: SURG

## 2024-04-24 RX ORDER — ONDANSETRON 2 MG/ML
4 INJECTION INTRAMUSCULAR; INTRAVENOUS EVERY 6 HOURS
Status: DISCONTINUED | OUTPATIENT
Start: 2024-04-24 | End: 2024-04-25 | Stop reason: HOSPADM

## 2024-04-24 RX ORDER — SODIUM CHLORIDE 0.9 % (FLUSH) 0.9 %
3 SYRINGE (ML) INJECTION AS NEEDED
Status: DISCONTINUED | OUTPATIENT
Start: 2024-04-24 | End: 2024-04-24 | Stop reason: HOSPADM

## 2024-04-24 RX ORDER — ENOXAPARIN SODIUM 100 MG/ML
40 INJECTION SUBCUTANEOUS DAILY
Status: DISCONTINUED | OUTPATIENT
Start: 2024-04-25 | End: 2024-04-25 | Stop reason: HOSPADM

## 2024-04-24 RX ORDER — SIMETHICONE 80 MG
40 TABLET,CHEWABLE ORAL 4 TIMES DAILY PRN
Status: DISCONTINUED | OUTPATIENT
Start: 2024-04-24 | End: 2024-04-25 | Stop reason: HOSPADM

## 2024-04-24 RX ORDER — ROCURONIUM BROMIDE 10 MG/ML
INJECTION, SOLUTION INTRAVENOUS AS NEEDED
Status: DISCONTINUED | OUTPATIENT
Start: 2024-04-24 | End: 2024-04-24 | Stop reason: SURG

## 2024-04-24 RX ORDER — DIPHENHYDRAMINE HYDROCHLORIDE 50 MG/ML
25 INJECTION INTRAMUSCULAR; INTRAVENOUS EVERY 6 HOURS PRN
Status: DISCONTINUED | OUTPATIENT
Start: 2024-04-24 | End: 2024-04-25 | Stop reason: HOSPADM

## 2024-04-24 RX ORDER — FAMOTIDINE 10 MG/ML
20 INJECTION, SOLUTION INTRAVENOUS
Status: DISCONTINUED | OUTPATIENT
Start: 2024-04-24 | End: 2024-04-24 | Stop reason: HOSPADM

## 2024-04-24 RX ORDER — GABAPENTIN 250 MG/5ML
250 SOLUTION ORAL ONCE
Status: COMPLETED | OUTPATIENT
Start: 2024-04-24 | End: 2024-04-24

## 2024-04-24 RX ORDER — TRAMADOL HYDROCHLORIDE 50 MG/1
50 TABLET ORAL EVERY 6 HOURS PRN
Status: DISCONTINUED | OUTPATIENT
Start: 2024-04-24 | End: 2024-04-25 | Stop reason: HOSPADM

## 2024-04-24 RX ORDER — ENOXAPARIN SODIUM 100 MG/ML
40 INJECTION SUBCUTANEOUS ONCE
Status: COMPLETED | OUTPATIENT
Start: 2024-04-24 | End: 2024-04-24

## 2024-04-24 RX ORDER — FENTANYL CITRATE 50 UG/ML
50 INJECTION, SOLUTION INTRAMUSCULAR; INTRAVENOUS
Status: COMPLETED | OUTPATIENT
Start: 2024-04-24 | End: 2024-04-24

## 2024-04-24 RX ORDER — THIAMINE HYDROCHLORIDE 100 MG/ML
100 INJECTION, SOLUTION INTRAMUSCULAR; INTRAVENOUS ONCE
Status: COMPLETED | OUTPATIENT
Start: 2024-04-24 | End: 2024-04-24

## 2024-04-24 RX ORDER — ACETAMINOPHEN 500 MG
500 TABLET ORAL ONCE
COMMUNITY

## 2024-04-24 RX ORDER — ONDANSETRON 2 MG/ML
INJECTION INTRAMUSCULAR; INTRAVENOUS AS NEEDED
Status: DISCONTINUED | OUTPATIENT
Start: 2024-04-24 | End: 2024-04-24 | Stop reason: SURG

## 2024-04-24 RX ORDER — KETOROLAC TROMETHAMINE 30 MG/ML
INJECTION, SOLUTION INTRAMUSCULAR; INTRAVENOUS AS NEEDED
Status: DISCONTINUED | OUTPATIENT
Start: 2024-04-24 | End: 2024-04-24 | Stop reason: SURG

## 2024-04-24 RX ORDER — PROPOFOL 10 MG/ML
VIAL (ML) INTRAVENOUS AS NEEDED
Status: DISCONTINUED | OUTPATIENT
Start: 2024-04-24 | End: 2024-04-24 | Stop reason: SURG

## 2024-04-24 RX ORDER — SODIUM CHLORIDE 0.9 % (FLUSH) 0.9 %
10 SYRINGE (ML) INJECTION EVERY 12 HOURS SCHEDULED
Status: DISCONTINUED | OUTPATIENT
Start: 2024-04-24 | End: 2024-04-25 | Stop reason: HOSPADM

## 2024-04-24 RX ORDER — SODIUM CHLORIDE 0.9 % (FLUSH) 0.9 %
10 SYRINGE (ML) INJECTION AS NEEDED
Status: DISCONTINUED | OUTPATIENT
Start: 2024-04-24 | End: 2024-04-24 | Stop reason: HOSPADM

## 2024-04-24 RX ORDER — FENTANYL CITRATE 50 UG/ML
INJECTION, SOLUTION INTRAMUSCULAR; INTRAVENOUS AS NEEDED
Status: DISCONTINUED | OUTPATIENT
Start: 2024-04-24 | End: 2024-04-24 | Stop reason: SURG

## 2024-04-24 RX ORDER — SODIUM CHLORIDE 9 MG/ML
40 INJECTION, SOLUTION INTRAVENOUS AS NEEDED
Status: DISCONTINUED | OUTPATIENT
Start: 2024-04-24 | End: 2024-04-25 | Stop reason: HOSPADM

## 2024-04-24 RX ORDER — SCOLOPAMINE TRANSDERMAL SYSTEM 1 MG/1
1 PATCH, EXTENDED RELEASE TRANSDERMAL CONTINUOUS
Status: DISCONTINUED | OUTPATIENT
Start: 2024-04-24 | End: 2024-04-25 | Stop reason: HOSPADM

## 2024-04-24 RX ORDER — B-COMPLEX WITH VITAMIN C
1 TABLET ORAL DAILY
COMMUNITY

## 2024-04-24 RX ORDER — HYDROMORPHONE HYDROCHLORIDE 1 MG/ML
0.5 INJECTION, SOLUTION INTRAMUSCULAR; INTRAVENOUS; SUBCUTANEOUS
Status: DISCONTINUED | OUTPATIENT
Start: 2024-04-24 | End: 2024-04-24 | Stop reason: HOSPADM

## 2024-04-24 RX ORDER — ONDANSETRON 2 MG/ML
4 INJECTION INTRAMUSCULAR; INTRAVENOUS EVERY 6 HOURS PRN
Status: DISCONTINUED | OUTPATIENT
Start: 2024-04-24 | End: 2024-04-24 | Stop reason: SDUPTHER

## 2024-04-24 RX ORDER — METOCLOPRAMIDE HYDROCHLORIDE 5 MG/ML
5 INJECTION INTRAMUSCULAR; INTRAVENOUS EVERY 8 HOURS
Status: DISCONTINUED | OUTPATIENT
Start: 2024-04-24 | End: 2024-04-25 | Stop reason: HOSPADM

## 2024-04-24 RX ORDER — FAMOTIDINE 10 MG/ML
20 INJECTION, SOLUTION INTRAVENOUS EVERY 12 HOURS SCHEDULED
Status: DISCONTINUED | OUTPATIENT
Start: 2024-04-24 | End: 2024-04-25 | Stop reason: HOSPADM

## 2024-04-24 RX ORDER — FENTANYL CITRATE 50 UG/ML
50 INJECTION, SOLUTION INTRAMUSCULAR; INTRAVENOUS ONCE
Status: COMPLETED | OUTPATIENT
Start: 2024-04-24 | End: 2024-04-24

## 2024-04-24 RX ORDER — KETOROLAC TROMETHAMINE 30 MG/ML
30 INJECTION, SOLUTION INTRAMUSCULAR; INTRAVENOUS EVERY 6 HOURS PRN
Status: DISCONTINUED | OUTPATIENT
Start: 2024-04-24 | End: 2024-04-25 | Stop reason: HOSPADM

## 2024-04-24 RX ORDER — SODIUM CHLORIDE 0.9 % (FLUSH) 0.9 %
10 SYRINGE (ML) INJECTION EVERY 12 HOURS SCHEDULED
Status: DISCONTINUED | OUTPATIENT
Start: 2024-04-24 | End: 2024-04-24 | Stop reason: HOSPADM

## 2024-04-24 RX ORDER — MAGNESIUM HYDROXIDE 1200 MG/15ML
LIQUID ORAL AS NEEDED
Status: DISCONTINUED | OUTPATIENT
Start: 2024-04-24 | End: 2024-04-24 | Stop reason: HOSPADM

## 2024-04-24 RX ORDER — SUCCINYLCHOLINE/SOD CL,ISO/PF 200MG/10ML
SYRINGE (ML) INTRAVENOUS AS NEEDED
Status: DISCONTINUED | OUTPATIENT
Start: 2024-04-24 | End: 2024-04-24 | Stop reason: SURG

## 2024-04-24 RX ORDER — SODIUM CHLORIDE 0.9 % (FLUSH) 0.9 %
1-10 SYRINGE (ML) INJECTION AS NEEDED
Status: DISCONTINUED | OUTPATIENT
Start: 2024-04-24 | End: 2024-04-25 | Stop reason: HOSPADM

## 2024-04-24 RX ADMIN — SIMETHICONE 40 MG: 80 TABLET, CHEWABLE ORAL at 19:23

## 2024-04-24 RX ADMIN — KETOROLAC TROMETHAMINE 30 MG: 30 INJECTION, SOLUTION INTRAMUSCULAR; INTRAVENOUS at 09:08

## 2024-04-24 RX ADMIN — FENTANYL CITRATE 50 MCG: 50 INJECTION, SOLUTION INTRAMUSCULAR; INTRAVENOUS at 09:49

## 2024-04-24 RX ADMIN — FENTANYL CITRATE 50 MCG: 50 INJECTION, SOLUTION INTRAMUSCULAR; INTRAVENOUS at 10:27

## 2024-04-24 RX ADMIN — PROPOFOL 175 MCG/KG/MIN: 10 INJECTION, EMULSION INTRAVENOUS at 08:51

## 2024-04-24 RX ADMIN — ONDANSETRON 4 MG: 2 INJECTION INTRAMUSCULAR; INTRAVENOUS at 17:36

## 2024-04-24 RX ADMIN — ONDANSETRON 4 MG: 2 INJECTION INTRAMUSCULAR; INTRAVENOUS at 23:42

## 2024-04-24 RX ADMIN — HYDROMORPHONE HYDROCHLORIDE 0.5 MG: 1 INJECTION, SOLUTION INTRAMUSCULAR; INTRAVENOUS; SUBCUTANEOUS at 09:43

## 2024-04-24 RX ADMIN — SODIUM CHLORIDE, POTASSIUM CHLORIDE, SODIUM LACTATE AND CALCIUM CHLORIDE 140 ML/HR: 600; 310; 30; 20 INJECTION, SOLUTION INTRAVENOUS at 19:23

## 2024-04-24 RX ADMIN — FAMOTIDINE 20 MG: 10 INJECTION INTRAVENOUS at 20:37

## 2024-04-24 RX ADMIN — SIMETHICONE 40 MG: 80 TABLET, CHEWABLE ORAL at 13:02

## 2024-04-24 RX ADMIN — HYDROMORPHONE HYDROCHLORIDE 1 MG: 1 INJECTION, SOLUTION INTRAMUSCULAR; INTRAVENOUS; SUBCUTANEOUS at 08:58

## 2024-04-24 RX ADMIN — GABAPENTIN 250 MG: 250 SOLUTION ORAL at 05:56

## 2024-04-24 RX ADMIN — DEXAMETHASONE SODIUM PHOSPHATE 4 MG: 4 INJECTION, SOLUTION INTRAMUSCULAR; INTRAVENOUS at 08:52

## 2024-04-24 RX ADMIN — ROCURONIUM BROMIDE 45 MG: 10 INJECTION, SOLUTION INTRAVENOUS at 07:27

## 2024-04-24 RX ADMIN — SODIUM CHLORIDE, POTASSIUM CHLORIDE, SODIUM LACTATE AND CALCIUM CHLORIDE 1000 ML: 600; 310; 30; 20 INJECTION, SOLUTION INTRAVENOUS at 06:05

## 2024-04-24 RX ADMIN — PROPOFOL 175 MCG/KG/MIN: 10 INJECTION, EMULSION INTRAVENOUS at 08:11

## 2024-04-24 RX ADMIN — CEFAZOLIN 2000 MG: 2 INJECTION, POWDER, FOR SOLUTION INTRAMUSCULAR; INTRAVENOUS at 07:25

## 2024-04-24 RX ADMIN — HYDROMORPHONE HYDROCHLORIDE 0.5 MG: 1 INJECTION, SOLUTION INTRAMUSCULAR; INTRAVENOUS; SUBCUTANEOUS at 12:06

## 2024-04-24 RX ADMIN — Medication 120 MG: at 07:23

## 2024-04-24 RX ADMIN — ROCURONIUM BROMIDE 20 MG: 10 INJECTION, SOLUTION INTRAVENOUS at 07:57

## 2024-04-24 RX ADMIN — METOCLOPRAMIDE HYDROCHLORIDE 5 MG: 5 INJECTION INTRAMUSCULAR; INTRAVENOUS at 12:46

## 2024-04-24 RX ADMIN — FENTANYL CITRATE 100 MCG: 50 INJECTION, SOLUTION INTRAMUSCULAR; INTRAVENOUS at 07:19

## 2024-04-24 RX ADMIN — Medication 10 ML: at 20:46

## 2024-04-24 RX ADMIN — PROPOFOL 125 MCG/KG/MIN: 10 INJECTION, EMULSION INTRAVENOUS at 07:25

## 2024-04-24 RX ADMIN — DEXAMETHASONE SODIUM PHOSPHATE 4 MG: 4 INJECTION, SOLUTION INTRA-ARTICULAR; INTRALESIONAL; INTRAMUSCULAR; INTRAVENOUS; SOFT TISSUE at 06:35

## 2024-04-24 RX ADMIN — ENOXAPARIN SODIUM 40 MG: 100 INJECTION SUBCUTANEOUS at 06:35

## 2024-04-24 RX ADMIN — SUGAMMADEX 200 MG: 100 INJECTION, SOLUTION INTRAVENOUS at 09:08

## 2024-04-24 RX ADMIN — HYDROMORPHONE HYDROCHLORIDE 0.5 MG: 1 INJECTION, SOLUTION INTRAMUSCULAR; INTRAVENOUS; SUBCUTANEOUS at 10:00

## 2024-04-24 RX ADMIN — CEFAZOLIN 2 G: 2 INJECTION, POWDER, FOR SOLUTION INTRAMUSCULAR; INTRAVENOUS at 15:34

## 2024-04-24 RX ADMIN — FAMOTIDINE 20 MG: 10 INJECTION INTRAVENOUS at 06:35

## 2024-04-24 RX ADMIN — BUSPIRONE HYDROCHLORIDE 15 MG: 10 TABLET ORAL at 20:38

## 2024-04-24 RX ADMIN — THIAMINE HYDROCHLORIDE 100 MG: 100 INJECTION, SOLUTION INTRAMUSCULAR; INTRAVENOUS at 13:56

## 2024-04-24 RX ADMIN — ONDANSETRON 4 MG: 2 INJECTION INTRAMUSCULAR; INTRAVENOUS at 12:48

## 2024-04-24 RX ADMIN — LIDOCAINE HYDROCHLORIDE 100 MG: 20 INJECTION, SOLUTION EPIDURAL; INFILTRATION; INTRACAUDAL; PERINEURAL at 07:23

## 2024-04-24 RX ADMIN — SODIUM CHLORIDE, POTASSIUM CHLORIDE, SODIUM LACTATE AND CALCIUM CHLORIDE 140 ML/HR: 600; 310; 30; 20 INJECTION, SOLUTION INTRAVENOUS at 12:02

## 2024-04-24 RX ADMIN — FAMOTIDINE 20 MG: 10 INJECTION INTRAVENOUS at 12:46

## 2024-04-24 RX ADMIN — FENTANYL CITRATE 50 MCG: 50 INJECTION, SOLUTION INTRAMUSCULAR; INTRAVENOUS at 06:35

## 2024-04-24 RX ADMIN — ACETAMINOPHEN 325 MG: 160 SUSPENSION ORAL at 20:38

## 2024-04-24 RX ADMIN — SODIUM CHLORIDE, POTASSIUM CHLORIDE, SODIUM LACTATE AND CALCIUM CHLORIDE 500 ML: 600; 310; 30; 20 INJECTION, SOLUTION INTRAVENOUS at 06:04

## 2024-04-24 RX ADMIN — ROCURONIUM BROMIDE 5 MG: 10 INJECTION, SOLUTION INTRAVENOUS at 07:23

## 2024-04-24 RX ADMIN — KETOROLAC TROMETHAMINE 30 MG: 30 INJECTION, SOLUTION INTRAMUSCULAR; INTRAVENOUS at 17:41

## 2024-04-24 RX ADMIN — METOCLOPRAMIDE HYDROCHLORIDE 5 MG: 5 INJECTION INTRAMUSCULAR; INTRAVENOUS at 20:37

## 2024-04-24 RX ADMIN — SCOPALAMINE 1 PATCH: 1 PATCH, EXTENDED RELEASE TRANSDERMAL at 06:36

## 2024-04-24 RX ADMIN — CEFAZOLIN 2 G: 2 INJECTION, POWDER, FOR SOLUTION INTRAMUSCULAR; INTRAVENOUS at 23:43

## 2024-04-24 RX ADMIN — ONDANSETRON 4 MG: 2 INJECTION INTRAMUSCULAR; INTRAVENOUS at 08:52

## 2024-04-24 RX ADMIN — TRAMADOL HYDROCHLORIDE 50 MG: 50 TABLET ORAL at 20:41

## 2024-04-24 RX ADMIN — PROPOFOL 200 MG: 10 INJECTION, EMULSION INTRAVENOUS at 07:23

## 2024-04-24 RX ADMIN — ACETAMINOPHEN 325 MG: 160 SUSPENSION ORAL at 13:56

## 2024-04-24 NOTE — ANESTHESIA POSTPROCEDURE EVALUATION
"Patient: Lottie Plunkett    Procedure Summary       Date: 04/24/24 Room / Location:  [facility] OR 06 /  [facility]    Anesthesia Start: 0719 Anesthesia Stop: 0914    Procedure: GASTRIC SLEEVE LAPAROSCOPIC WITH DAVINCI ROBOT (Abdomen) Diagnosis:       Class 3 severe obesity due to excess calories without serious comorbidity with body mass index (BMI) of 40.0 to 44.9 in adult      Mixed hyperlipidemia      Nicotine abuse      (Class 3 severe obesity due to excess calories without serious comorbidity with body mass index (BMI) of 40.0 to 44.9 in adult [E66.01, Z68.41])      (Mixed hyperlipidemia [E78.2])      (Nicotine abuse [Z72.0])    Surgeons: Adrian Ryan MD Provider: Nathan Reyes CRNA    Anesthesia Type: general ASA Status: 3            Anesthesia Type: general    Vitals  Vitals Value Taken Time   /81 04/24/24 1135   Temp 97.6 °F (36.4 °C) 04/24/24 1135   Pulse 88 04/24/24 1135   Resp 16 04/24/24 1135   SpO2 98 % 04/24/24 1135           Post Anesthesia Care and Evaluation    Patient location during evaluation: PACU  Patient participation: complete - patient participated  Level of consciousness: awake and alert  Pain management: adequate    Airway patency: patent  Anesthetic complications: No anesthetic complications  PONV Status: none  Cardiovascular status: acceptable and hemodynamically stable  Respiratory status: acceptable  Hydration status: acceptable    Comments: Blood pressure 144/79, pulse 68, temperature 97 °F (36.1 °C), temperature source Oral, resp. rate 18, height 165 cm (64.96\"), weight 108 kg (238 lb 12.1 oz), last menstrual period 03/25/2024, SpO2 97%.    Patient discharged from PACU based upon Michael score. Please see RN notes for further details    "

## 2024-04-24 NOTE — ANESTHESIA PROCEDURE NOTES
Airway  Urgency: elective    Date/Time: 4/24/2024 7:24 AM  Airway not difficult    General Information and Staff    Patient location during procedure: OR  CRNA/CAA: Nathan Reyes CRNA    Indications and Patient Condition  Indications for airway management: airway protection    Preoxygenated: yes  Mask difficulty assessment: 1 - vent by mask    Final Airway Details  Final airway type: endotracheal airway      Successful airway: ETT  Cuffed: yes   Successful intubation technique: video laryngoscopy  Facilitating devices/methods: intubating stylet  Endotracheal tube insertion site: oral  Blade: Rousseau  Blade size: 3  ETT size (mm): 7.5  Cormack-Lehane Classification: grade I - full view of glottis  Placement verified by: chest auscultation and capnometry   Measured from: lips  ETT/EBT  to lips (cm): 21  Number of attempts at approach: 1  Assessment: lips, teeth, and gum same as pre-op and atraumatic intubation

## 2024-04-24 NOTE — ANESTHESIA PREPROCEDURE EVALUATION
Anesthesia Evaluation     Patient summary reviewed   history of anesthetic complications:   NPO Solid Status: > 8 hours  NPO Liquid Status: > 2 hours           Airway   Mallampati: II  TM distance: >3 FB  Neck ROM: full  Dental      Pulmonary    (+) ,sleep apnea  (-) COPD, asthma, not a smoker  Cardiovascular   Exercise tolerance: excellent (>7 METS)    (+) hyperlipidemia  (-) pacemaker, past MI, angina, cardiac stents      Neuro/Psych  (-) seizures, TIA, CVA  GI/Hepatic/Renal/Endo    (+) morbid obesity  (-) GERD, liver disease, no renal disease, diabetes    Musculoskeletal     Abdominal    Substance History      OB/GYN          Other                    Anesthesia Plan    ASA 3     general     intravenous induction     Anesthetic plan, risks, benefits, and alternatives have been provided, discussed and informed consent has been obtained with: patient.    CODE STATUS:

## 2024-04-24 NOTE — OP NOTE
Pre-op Diagnosis:   Class 3 severe obesity due to excess calories without serious comorbidity with body mass index (BMI) of 40.0 to 44.9 in adult [E66.01, Z68.41]  Mixed hyperlipidemia [E78.2]  Nicotine abuse [Z72.0]       Post-Op Diagnosis Codes:     * Class 3 severe obesity due to excess calories without serious comorbidity with body mass index (BMI) of 40.0 to 44.9 in adult [E66.01, Z68.41]     * Mixed hyperlipidemia [E78.2]     * Nicotine abuse [Z72.0]    Indications: The patient was admitted to the hospital with history of morbid obesity with a Body mass index is 39.78 kg/m².   she is scheduled for a Laparoscopic Sleeve Gastrectomy with robotic assistance       Surgeon: Adrian Ryan MD     Assistants: Sirisha    Anesthesia: General endotracheal anesthesia    ASA Class: 3, 4          Procedure Details       After the patient was explained the alternatives, benefits, complications, and risks of the robotic-assisted laparoscopic sleeve gastrectomy an informed consent was provided.  The patient was brought to the OR suite after receiving preoperative antibiotics, medications and anticoagulation.  The patient was placed under general anesthesia.  The patient was then prepped and draped in standard fashion.  We performed a surgical timeout.  An 40 Frisian bougie was placed into the oropharynx by anesthesia followed by placement to suction.  I then proceeded to locally anesthetize the left upper quadrant site for placement of a Veress needle to insufflate the abdominal cavity to a pressure of 15 mmHg.  This was followed by placement of an 8 mm incision which was followed by placement of an 8 mm trocar into the abdominal cavity with camera assist location left upper quadrant.   This trocar was my AirSeal trocar and it was placed into the abdominal cavity under direct visualization.  The Veress needle was identified and removed safely.  An 8 mm incision was made in the periumbilical midline location for placement of an  8 mm trocar under direct visualization, location approximately 28 cm from the sternal notch.  An 12 mm trocar was placed on the patient's right upper quadrant lateral to the periumbilical trocar under direct visualization as well as an 8 mm trocar placed in the left upper quadrant's lateral site along the same plane under direct visualization. This was then followed by placement of an 8 mm trocar in the left quadrant lateral to the site additionally.   A 5 mm incision was placed in the subxiphoid location for placement of a 5 mm trocar under direct visualization.  I removed this trocar and replaced it with a Aaron liver retractor.  No hiatal hernia defect appreciated.  Once adequate exposure of the hiatus and stomach was obtained, I proceeded to dock the robot to the trocars.  This was assisted by targeting.  My robotic instruments were then placed under direct visualization into the surgical field.  After breaking scrub away from the surgical table I went to the robotic console and began the procedure.  First I proceeded with dissecting the fat pad near the angle of His away from the diaphragm.  I then proceeded with ligating the greater curvature vessels starting at the mid greater curvature working my way distally and proximally where my most distal ligation site was 6 cm from the pylorus.  I continued proximally along the greater curvature ligating the vessels as well as a short gastrics.  This was all accomplished using the vessel sealer.  Once completion of ligation of the vessels was obtained. I dissected away attachments found posteriorly to the stomach.  Care was made to assure no injury to the pancreas. Completion of my dissection was obtained once visualization of the posterior left crural muscle was seen.  I proceeded with marking the stomach in standard fashion.  Marking points visually which was approximately 1-1/2 cm from the GE junction, point B which is approximately 3 cm from the angularis  incisura and finally point C which is approximately 6 cm proximal from the pylorus.  The sleeve gastrectomy was then created.  I proceeded with stapling the stomach in this fashion based off of these points.  I fired my most distal load first and completed the firing of the stapler device to create the sleeve gastrectomy hugging the bougie.  Again my most distal staple line was approximately 6 cm from the pylorus and I continued proximally along the greater curvature and assuring that the width from the angularis incisura was at least 3 cm.  My final proximal stapler was at least 1-2 cm from the GE junction.    I then assessed the staple line to observe for hemostasis.  Once hemostasis was confirmed I then proceeded with my leak test.  I requested insufflation of the bougie into the sleeve utilizing air and submerging the staple line under saline.  I observed for any evidence of bubbles or leakage.  There was no evidence of leakage or bubbles noted and I then proceeded with suctioning out the air from the sleeve.  Once there was no evidence of leakage I then requested that the bougie be placed off suction, given a small puff and removed under direct visualization.    I placed Surgicel SNoW on the staple line under direct visualization.    I then pexied the omentum to the proximal portion of the sleeve with 2-0 absorbable suture.  I reassessed for hemostasis and then proceeded to scrub back into the case for removal of the resected portion of the stomach after the robotic instruments were removed under direct visualization.  The resected portion of the stomach was removed from the 12 mm trocar site under direct visualization.    The 12 mm trocar site's fascia was reapproximated using a Xavier-Johnie device and an 0 Vicryl suture.   The liver retractor was removed under direct visualization as well as the pneumoperitoneum and remaining trocars.  Then re-locally anesthetized skin wounds for closure.  Skin wounds were  closed with 4-0 Monocryl.  Prior to closing skin wounds all lap pads and attachments were accounted for at the end of the procedure.      White staples: 5  Blue staples: 1 with staple line reinforcement        Findings: Within normal limits    Estimated Blood Loss:  minimal                    Total IV Fluids: Approximately 500 mL           Specimens:   Specimens       ID Source Type Tests Collected By Collected At Frozen?    A Gastric, Fundus Tissue TISSUE PATHOLOGY EXAM   Adrian Ryan MD 4/24/24 0829 No    Description: fundus of stomach                   Implants:   Implant Name Type Inv. Item Serial No.  Lot No. LRB No. Used Action   HEMOST ABS SURGICEL SNOW 4X4IN - [id number] Implant HEMOST ABS SURGICEL SNOW 4X4IN  [employer]  DIV [employer] J AND J [id number] N/A 1 Implanted   RELOAD STPLR SUREFORM 60 DAVINCI/X/XI 6ROW 2.5 WHT 1P/U - [id number] Implant RELOAD STPLR SUREFORM 60 DAVINCI/X/XI 6ROW 2.5 WHT 1P/U  INTUITIVE SURGICAL [id number] N/A 4 Implanted   RELOAD STPLR SUREFORM 60 DAVINCI/X/XI 6ROW 3.5 RAFFY 1P/U - [id number] Implant RELOAD STPLR SUREFORM 60 DAVINCI/X/XI 6ROW 3.5 RAFFY 1P/U  INTUITIVE SURGICAL [id number] N/A 1 Implanted   STPL/LN REINF ECHELONENDOPATH 60MM - [id number] Implant STPL/LN REINF ECHELONENDOPATH 60MM  [employer] END[employer]  DIV OF J AND J [id number] N/A 2 Implanted   RELOAD STPLR SUREFORM 60 DAVINCI/X/XI 6ROW 3.5 RAFFY 1P/U - [id number] Implant RELOAD STPLR SUREFORM 60 DAVINCI/X/XI 6ROW 3.5 RAFFY 1P/U  INTUITIVE SURGICAL [id number] N/A 1 Implanted   RELOAD STPLR SUREFORM 60 DAVINCI/X/XI 6ROW 2.5 WHT 1P/U - [id number] Implant RELOAD STPLR SUREFORM 60 DAVINCI/X/XI 6ROW 2.5 WHT 1P/U  INTUITIVE SURGICAL [id number] N/A 1 Implanted              Complications: None           Disposition: PACU - hemodynamically stable.           Condition: stable

## 2024-04-24 NOTE — PLAN OF CARE
Goal Outcome Evaluation:  Plan of Care Reviewed With: patient           Outcome Evaluation: patient admitted post lap sleeve gastrectomy - lap x7/binder on. IVF, voiding, SCDs, up with assistance ambulating. Safety maintained

## 2024-04-24 NOTE — BRIEF OP NOTE
GASTRIC SLEEVE LAPAROSCOPIC WITH DAVINCI ROBOT  Progress Note    Lottie Lou Samples  4/24/2024    Pre-op Diagnosis:   Class 3 severe obesity due to excess calories without serious comorbidity with body mass index (BMI) of 40.0 to 44.9 in adult [E66.01, Z68.41]  Mixed hyperlipidemia [E78.2]  Nicotine abuse [Z72.0]       Post-Op Diagnosis Codes:     * Class 3 severe obesity due to excess calories without serious comorbidity with body mass index (BMI) of 40.0 to 44.9 in adult [E66.01, Z68.41]     * Mixed hyperlipidemia [E78.2]     * Nicotine abuse [Z72.0]    Procedure/CPT® Codes:        Procedure(s):  GASTRIC SLEEVE LAPAROSCOPIC WITH DAVINCI ROBOT              Surgeon(s):  Adrian Ryan MD    Anesthesia: General    Staff:   Circulator: Nahomy Catherine RN  Scrub Person: Eulalio Marte; Sirisha Kim, CIARRA; Filippo Rodriguez         Estimated Blood Loss: minimal    Urine Voided: * No values recorded between 4/24/2024  7:19 AM and 4/24/2024  9:13 AM *    Specimens:                Specimens       ID Source Type Tests Collected By Collected At Frozen?    A Gastric, Fundus Tissue TISSUE PATHOLOGY EXAM   Adrian Ryan MD 4/24/24 3377 No    Description: fundus of stomach                  Findings: Within normal limits        Complications: None          Adrian Ryan MD     Date: 4/24/2024  Time: 09:17 CDT

## 2024-04-25 VITALS
TEMPERATURE: 98 F | OXYGEN SATURATION: 100 % | BODY MASS INDEX: 39.78 KG/M2 | SYSTOLIC BLOOD PRESSURE: 146 MMHG | DIASTOLIC BLOOD PRESSURE: 94 MMHG | RESPIRATION RATE: 22 BRPM | HEIGHT: 65 IN | WEIGHT: 238.76 LBS | HEART RATE: 65 BPM

## 2024-04-25 LAB
CYTO UR: NORMAL
HCT VFR BLD AUTO: 36.8 % (ref 34–46.6)
HGB BLD-MCNC: 11.9 G/DL (ref 12–15.9)
LAB AP CASE REPORT: NORMAL
Lab: NORMAL
PATH REPORT.FINAL DX SPEC: NORMAL
PATH REPORT.GROSS SPEC: NORMAL

## 2024-04-25 PROCEDURE — 25010000002 KETOROLAC TROMETHAMINE PER 15 MG: Performed by: SURGERY

## 2024-04-25 PROCEDURE — 85014 HEMATOCRIT: CPT | Performed by: SURGERY

## 2024-04-25 PROCEDURE — 25810000003 LACTATED RINGERS PER 1000 ML: Performed by: SURGERY

## 2024-04-25 PROCEDURE — 85018 HEMOGLOBIN: CPT | Performed by: SURGERY

## 2024-04-25 PROCEDURE — 25010000002 ONDANSETRON PER 1 MG: Performed by: SURGERY

## 2024-04-25 PROCEDURE — 25010000002 METOCLOPRAMIDE PER 10 MG: Performed by: SURGERY

## 2024-04-25 PROCEDURE — 25010000002 ENOXAPARIN PER 10 MG: Performed by: SURGERY

## 2024-04-25 RX ORDER — NALOXONE HYDROCHLORIDE 4 MG/.1ML
SPRAY NASAL
Qty: 2 EACH | Refills: 0 | Status: SHIPPED | OUTPATIENT
Start: 2024-04-25

## 2024-04-25 RX ORDER — OMEPRAZOLE 20 MG/1
20 CAPSULE, DELAYED RELEASE ORAL DAILY
Qty: 30 CAPSULE | Refills: 0 | Status: SHIPPED | OUTPATIENT
Start: 2024-04-25

## 2024-04-25 RX ORDER — SIMETHICONE 80 MG
40 TABLET,CHEWABLE ORAL EVERY 6 HOURS PRN
Qty: 30 TABLET | Refills: 1 | Status: SHIPPED | OUTPATIENT
Start: 2024-04-25

## 2024-04-25 RX ADMIN — SODIUM CHLORIDE, POTASSIUM CHLORIDE, SODIUM LACTATE AND CALCIUM CHLORIDE 140 ML/HR: 600; 310; 30; 20 INJECTION, SOLUTION INTRAVENOUS at 11:07

## 2024-04-25 RX ADMIN — ONDANSETRON 4 MG: 2 INJECTION INTRAMUSCULAR; INTRAVENOUS at 05:18

## 2024-04-25 RX ADMIN — ENOXAPARIN SODIUM 40 MG: 100 INJECTION SUBCUTANEOUS at 09:07

## 2024-04-25 RX ADMIN — ACETAMINOPHEN 325 MG: 160 SUSPENSION ORAL at 05:17

## 2024-04-25 RX ADMIN — TRAMADOL HYDROCHLORIDE 50 MG: 50 TABLET ORAL at 03:02

## 2024-04-25 RX ADMIN — FAMOTIDINE 20 MG: 10 INJECTION INTRAVENOUS at 09:07

## 2024-04-25 RX ADMIN — ONDANSETRON 4 MG: 2 INJECTION INTRAMUSCULAR; INTRAVENOUS at 11:15

## 2024-04-25 RX ADMIN — HYDROCODONE BITARTRATE AND ACETAMINOPHEN 15 ML: 7.5; 325 SOLUTION ORAL at 11:51

## 2024-04-25 RX ADMIN — TRAMADOL HYDROCHLORIDE 50 MG: 50 TABLET ORAL at 09:06

## 2024-04-25 RX ADMIN — KETOROLAC TROMETHAMINE 30 MG: 30 INJECTION, SOLUTION INTRAMUSCULAR; INTRAVENOUS at 11:06

## 2024-04-25 RX ADMIN — METOCLOPRAMIDE HYDROCHLORIDE 5 MG: 5 INJECTION INTRAMUSCULAR; INTRAVENOUS at 12:17

## 2024-04-25 RX ADMIN — METOCLOPRAMIDE HYDROCHLORIDE 5 MG: 5 INJECTION INTRAMUSCULAR; INTRAVENOUS at 05:18

## 2024-04-25 RX ADMIN — SODIUM CHLORIDE, POTASSIUM CHLORIDE, SODIUM LACTATE AND CALCIUM CHLORIDE 140 ML/HR: 600; 310; 30; 20 INJECTION, SOLUTION INTRAVENOUS at 03:02

## 2024-04-25 RX ADMIN — SIMETHICONE 40 MG: 80 TABLET, CHEWABLE ORAL at 03:02

## 2024-04-25 RX ADMIN — SIMETHICONE 40 MG: 80 TABLET, CHEWABLE ORAL at 11:13

## 2024-04-25 NOTE — DISCHARGE SUMMARY
"  Date of Discharge:  4/25/2024    Discharge Diagnosis: Class 3 severe obesity due to excess calories without serious comorbidity with body mass index (BMI) of 40.0 to 44.9 in adult [E66.01, Z68.41]  Mixed hyperlipidemia [E78.2]  Nicotine abuse [Z72.0]  Class 3 severe obesity due to excess calories with serious comorbidity and body mass index (BMI) of 40.0 to 44.9 in adult [E66.01, Z68.41]    Presenting Problem/History of Present Illness  Class 3 severe obesity due to excess calories without serious comorbidity with body mass index (BMI) of 40.0 to 44.9 in adult [E66.01, Z68.41]  Mixed hyperlipidemia [E78.2]  Nicotine abuse [Z72.0]  Class 3 severe obesity due to excess calories with serious comorbidity and body mass index (BMI) of 40.0 to 44.9 in adult [E66.01, Z68.41]       Hospital Course  Patient is a 38 y.o. female presented with morbid obesity and is status post laparoscopic sleeve gastrectomy.  Postoperatively the patient remained stable.  She continued to ambulate and utilize her incentive spirometer.  She voided.  She tolerated stage I diet.  She did struggle with her oral pain medication in the form of Ultram and this was changed to Hycet.  This was more acceptable and controlled her pain well.  She was discharged home in a stable condition after postoperative instructions, diet restrictions weight and activity restrictions were explained to the patient and scheduled to follow-up accordingly.          Procedures Performed  Procedure(s):  GASTRIC SLEEVE LAPAROSCOPIC WITH DAVINCI ROBOT       Consults:   Consults       No orders found for last 30 day(s).              Condition on Discharge:  Stable    Vital Signs  /94 (BP Location: Left arm, Patient Position: Sitting)   Pulse 65   Temp 98 °F (36.7 °C) (Oral)   Resp 22   Ht 165 cm (64.96\")   Wt 108 kg (238 lb 12.1 oz) Comment: waist size 47 inches  LMP 03/25/2024 (Approximate)   SpO2 100%   BMI 39.78 kg/m²     Physical Exam:  General Appearance: " alert, appears stated age, and cooperative  Lungs: clear to auscultation, respirations regular, respirations even, and respirations unlabored  Heart: regular rhythm & normal rate, normal S1, S2, no murmur, no gallop, no rub, and no click  Abdomen: normal bowel sounds, no masses, no hepatomegaly, no splenomegaly, no guarding, and no rebound tenderness, tender  RUQ  Extremities: moves extremities well, no edema, no cyanosis, and no redness    Discharge Disposition  Home or Self Care    Discharge Medications     Discharge Medications        New Medications        Instructions Start Date   HYDROcodone-acetaminophen 7.5-325 MG/15ML solution  Commonly known as: HYCET   15 mL, Oral, Every 6 Hours PRN      naloxone 4 MG/0.1ML nasal spray  Commonly known as: NARCAN   Call 911. Don't prime. [place] in 1 nostril for overdose. Repeat in 2-3 minutes in other nostril if no or minimal breathing/responsiveness.      omeprazole 20 MG capsule  Commonly known as: priLOSEC   20 mg, Oral, Daily      simethicone 80 MG chewable tablet  Commonly known as: Gas-X   40 mg, Oral, Every 6 Hours PRN             Continue These Medications        Instructions Start Date   acetaminophen 500 MG tablet  Commonly known as: TYLENOL   500 mg, Oral, Once      BIOTIN PO   1 tablet, Oral, Daily      busPIRone 15 MG tablet  Commonly known as: BUSPAR   15 mg, Oral, 3 Times Daily PRN      calcium citrate-vitamin d 200-250 MG-UNIT tablet tablet  Commonly known as: CITRACAL   2 tablets, Oral, Daily      gabapentin 300 MG capsule  Commonly known as: NEURONTIN   300 mg, Oral, Nightly PRN      hydrOXYzine pamoate 25 MG capsule  Commonly known as: VISTARIL   25 mg, Oral, Nightly      traMADol 50 MG tablet  Commonly known as: ULTRAM   1 tablet, Oral, Every 6 Hours PRN      Vitamin B Complex tablet   1 tablet, Oral, Daily             Stop These Medications      DIGESTIVE HEALTH PROBIOTIC PO     ibuprofen 800 MG tablet  Commonly known as: ADVIL,MOTRIN     multivitamin  with minerals tablet tablet     simvastatin 10 MG tablet  Commonly known as: ZOCOR     vitamin D 1.25 MG (78459 UT) capsule capsule  Commonly known as: ERGOCALCIFEROL              Discharge Diet: Stage I    Activity at Discharge: No heavy lifting greater than 15 pounds for 4 weeks.  Specific details have been written out for the patient and her discharged handout packet.    Follow-up Appointments  Future Appointments   Date Time Provider Department Center   5/1/2024 11:15 AM Adrian Ryan MD [facility] None   5/28/2024  9:30 AM Adrian Ryan MD [facility] None   5/28/2024 10:00 AM Mckenzie Walsh, STANTON [facility] None   7/26/2024  9:00 AM Mckenzie Walsh, [place] [facility] None         Test Results Pending at Discharge       Adrian Ryan MD  04/25/24  15:26 CDT

## 2024-04-25 NOTE — PLAN OF CARE
Goal Outcome Evaluation:           Progress: improving     Patient being discharged to home.  Goals met.  Tolerating Stage 1 bariatric.  Good pain control with Hycet.  Up ad robyn and ambulating in hallway. Sites JAKI to abdomen.

## 2024-04-25 NOTE — PLAN OF CARE
Goal Outcome Evaluation:              Outcome Evaluation: Patient A&Ox4. Room air. Buspar resumed. Encouraging IS and ambulation. Pain well controlled with PO. C/o gas discomfort; see MAR. Denies nausea. IVF @ 140. IV abx. Small sips with meds; tolerating ice chips. Lap x7 clean, dry, and intact with glue and abdominal binder. Ad robyn. Voiding. SCDS for VTE prevention. Visitor at bedside. VSS. Call light within reach, safety maintained.

## 2024-04-26 NOTE — PAYOR COMM NOTE
"REF: [id number]     [facility]  FAX  [phone]      Gisela Richards (38 y.o. Female)       Date of Birth   1985    Social Security Number       Address   [address] [place] [address]    Home Phone   [phone]    MRN   [id number]       Buddhism   Other    Marital Status   Legally                             Admission Date   4/24/24    Admission Type   Elective    Admitting Provider   Adrian Ryan MD    Attending Provider       Department, Room/Bed   [facility] 3C, 376/1       Discharge Date   4/25/2024    Discharge Disposition   Home or Self Care    Discharge Destination                                 Attending Provider: (none)   Allergies: Mangifera Indica Fruit Ext ([place]) [Mangifera Indica], Mushroom    Isolation: None   Infection: None   Code Status: Prior    Ht: 165 cm (64.96\")   Wt: 108 kg (238 lb 12.1 oz)    Admission Cmt: None   Principal Problem: Class 3 severe obesity due to excess calories without serious comorbidity with body mass index (BMI) of 40.0 to 44.9 in adult [E66.01,Z68.41]                   Active Insurance as of 4/24/2024       Primary Coverage       Payor Plan Insurance Group Employer/Plan Group    CIG[employer] CIGNA [id number]       Payor Plan Address Payor Plan Phone Number Payor Plan Fax Number Effective Dates    [address] [phone]  7/11/2022 - None Entered    [facility] [address]         Subscriber Name Subscriber Birth Date Member ID       GISELA RICHARDS 1985 [id number]                     Emergency Contacts        (Rel.) Home Phone Work Phone Mobile Phone    DELFINA LUNA (Friend) -- -- [phone]                Discharge Order (From admission, onward)       Start     Ordered    04/25/24 1524  Discharge patient  Once        Expected Discharge Date: 04/25/24   Discharge Disposition: Home or Self Care   Physician of Record for Attribution - Please select from Treatment Team: NATALIE" ALYX DOW [[id number]]   Review needed by CMO to determine Physician of Record: No      Question Answer Comment   Physician of Record for Attribution - Please select from Treatment Team ALYX ESTRADA    Review needed by CMO to determine Physician of Record No        04/25/24 1521

## 2024-05-01 ENCOUNTER — OFFICE VISIT (OUTPATIENT)
Dept: BARIATRICS/WEIGHT MGMT | Facility: CLINIC | Age: 39
End: 2024-05-01
Payer: COMMERCIAL

## 2024-05-01 VITALS
BODY MASS INDEX: 38.25 KG/M2 | HEART RATE: 106 BPM | DIASTOLIC BLOOD PRESSURE: 82 MMHG | TEMPERATURE: 97.8 F | SYSTOLIC BLOOD PRESSURE: 122 MMHG | WEIGHT: 229.6 LBS | OXYGEN SATURATION: 97 % | HEIGHT: 65 IN

## 2024-05-01 DIAGNOSIS — Z98.84 STATUS POST LAPAROSCOPIC SLEEVE GASTRECTOMY: Primary | ICD-10-CM

## 2024-05-01 PROCEDURE — 99024 POSTOP FOLLOW-UP VISIT: CPT | Performed by: SURGERY

## 2024-05-01 NOTE — PROGRESS NOTES
"  Patient Care Team:  Shaunna Choudhury APRN as PCP - General (Otolaryngology)    Subjective     Lottie Plunkett is a 38 y.o. female.     Lottie is post op 1 week from sleeve gastrectomy.  She is currently on her stage II diet.  Patient has had flatus no bowel movement as of yet.  She is ambulating and pain is improved postoperatively.  Consuming at least 32 to 40 ounces of water daily.  She is tolerated the advancement of her diet to stage II without difficulty.    Review Of Systems:  General ROS: negative  Respiratory ROS: no cough, shortness of breath, or wheezing  Cardiovascular ROS: no chest pain or dyspnea on exertion  Gastrointestinal ROS: no abdominal pain, change in bowel habits, or black or bloody stools    The following portions of the patient's history were reviewed and updated as appropriate: allergies, current medications, past family history, past medical history, past social history, past surgical history, and problem list.    Objective   /82 (BP Location: Right arm, Patient Position: Sitting, Cuff Size: Adult)   Pulse 106   Temp 97.8 °F (36.6 °C)   Ht 165.1 cm (65\")   Wt 104 kg (229 lb 9.6 oz)   LMP 11/20/2023 (Exact Date)   SpO2 97%   BMI 38.21 kg/m²       05/01/24  1105   Weight: 104 kg (229 lb 9.6 oz)        General Appearance:  awake, alert, oriented, in no acute distress  Lungs:  Normal expansion.  Clear to auscultation.  No rales, rhonchi, or wheezing.  Heart:  Heart regular rate and rhythm  Abdomen:  Soft, non-tender, normal bowel sounds; no bruits, organomegaly or masses.  Abnormal shape: obese  Extremities: Extremities warm to touch, pink, with no edema.  Wounds: clean, dry, intact    ASSESSMENT/ PLAN    Encounter Diagnoses   Name Primary?    Status post laparoscopic sleeve gastrectomy Yes     May advance her diet according to our directives and start her multivitamins today.  Goal is to increase her fluids to 64 ounces daily.  The patient and I discussed their " weight restrictions which is defined as avoid lifting greater than 15 lbs for at least 4 weeks to allow healing of her tissue.  I also discussed the avoidance of driving or operating a motor vehicle if she requires, needs taking pain medication, or has a distracting injury or pain because of the risk of injuring herself or others.  The patient may advance diet as directed.    05/01/24  12:05 CDT  Patient Care Team:  Shaunna Choudhury APRN as PCP - General (Otolaryngology)  Adrian Ryan MD FACS

## 2024-05-16 ENCOUNTER — TELEPHONE (OUTPATIENT)
Dept: BARIATRICS/WEIGHT MGMT | Facility: CLINIC | Age: 39
End: 2024-05-16
Payer: COMMERCIAL

## 2024-05-16 NOTE — TELEPHONE ENCOUNTER
Called Pt in regards to concerns via [employer] message.    Is having difficulty in taking adequate fluid and meals without feeling overly nauseated, full, and sometimes vomiting.     Reviewed food recall and encouraged her to send in her food/fluid journal for further review.   Recommend:   Clear liquids with protein supplements for 24 hrs, following by full liquid for 48 hrs, and slowly advance through stages as tolerated; call on-call MD if symptoms worsen.   Also:  -Refrain from adding double protein in protein supplements so they are not so heavy and she may be able to drink more fluid quicker  -Avoid adding peanut butter to foods/meals/shakes.

## 2024-05-20 ENCOUNTER — OFFICE VISIT (OUTPATIENT)
Dept: BARIATRICS/WEIGHT MGMT | Facility: CLINIC | Age: 39
End: 2024-05-20
Payer: COMMERCIAL

## 2024-05-20 VITALS
HEART RATE: 93 BPM | TEMPERATURE: 98.4 F | HEIGHT: 65 IN | WEIGHT: 224 LBS | DIASTOLIC BLOOD PRESSURE: 78 MMHG | OXYGEN SATURATION: 98 % | BODY MASS INDEX: 37.32 KG/M2 | SYSTOLIC BLOOD PRESSURE: 108 MMHG

## 2024-05-20 DIAGNOSIS — Z98.84 STATUS POST LAPAROSCOPIC SLEEVE GASTRECTOMY: Primary | ICD-10-CM

## 2024-05-20 PROCEDURE — 99024 POSTOP FOLLOW-UP VISIT: CPT | Performed by: SURGERY

## 2024-05-20 NOTE — PROGRESS NOTES
"  Patient Care Team:  Shaunna Choudhury APRN as PCP - General (Otolaryngology)    Subjective     Lottie Plunkett is a 38 y.o. female.     Lottie is post op 1 month from her sleeve gastrectomy procedure.  She is currently on her stage III diet.   Last solid meal was a meatball on Thursday.  Afterwards started willing pain.  She had difficulty swallowing liquids afterwards.   Today had 16 oz water but no pain.  Had a protein shake and consumed 11 Oz only.  50 Oz of fluids  yesterday.     Review Of Systems:  General ROS: negative  Respiratory ROS: no cough, shortness of breath, or wheezing  Cardiovascular ROS: no chest pain or dyspnea on exertion  Gastrointestinal ROS: no abdominal pain, change in bowel habits, or black or bloody stools  positive for - swallowing difficulty/pain    The following portions of the patient's history were reviewed and updated as appropriate: allergies, current medications, past family history, past medical history, past social history, past surgical history, and problem list.    Objective   /78 (BP Location: Left arm, Patient Position: Sitting, Cuff Size: Adult)   Pulse 93   Temp 98.4 °F (36.9 °C)   Ht 165.1 cm (65\")   Wt 102 kg (224 lb)   LMP 11/20/2023 (Exact Date)   SpO2 98%   BMI 37.28 kg/m²       05/20/24  1058   Weight: 102 kg (224 lb)        General Appearance:  awake, alert, oriented, in no acute distress  Lungs:  Normal expansion.  Clear to auscultation.  No rales, rhonchi, or wheezing.  Heart:  Heart regular rate and rhythm  Abdomen:  Soft, non-tender, normal bowel sounds; no bruits, organomegaly or masses.  Abnormal shape: obese  Extremities: Extremities warm to touch, pink, with no edema.  Wounds: clean, dry, intact    ASSESSMENT/ PLAN    Encounter Diagnoses   Name Primary?    Status post laparoscopic sleeve gastrectomy Yes     I have ordered an upper GI to assess patency of her sleeve.  She may have some residual edema secondary to  her advancing her " diet prematurely earlier.  She is able to tolerate full liquids and soft foods however I would like for her to consistently obtain at least 64 ounces of fluids daily in addition to her 4 meals a day of solid foods.  At this time I did not recommend solid foods and recommended continuing with full liquids and stage III meals at this time.  Allow the edema does reduce if this is the etiology.  She will follow-up with us as needed and will accomplish upper GI today or tomorrow.    05/20/24  11:21 CDT  Patient Care Team:  Shaunna Choudhury APRN as PCP - General (Otolaryngology)  Adrian Ryan MD FACS

## 2024-05-21 ENCOUNTER — HOSPITAL ENCOUNTER (OUTPATIENT)
Dept: GENERAL RADIOLOGY | Facility: HOSPITAL | Age: 39
Discharge: HOME | End: 2024-05-21
Admitting: RADIOLOGY
Payer: COMMERCIAL

## 2024-05-21 ENCOUNTER — TELEPHONE (OUTPATIENT)
Dept: BARIATRICS/WEIGHT MGMT | Facility: CLINIC | Age: 39
End: 2024-05-21
Payer: COMMERCIAL

## 2024-05-21 DIAGNOSIS — Z98.84 STATUS POST LAPAROSCOPIC SLEEVE GASTRECTOMY: Primary | ICD-10-CM

## 2024-05-21 PROCEDURE — 0 DIATRIZOATE MEGLUMINE & SODIUM PER 1 ML: Performed by: SURGERY

## 2024-05-21 PROCEDURE — 74240 X-RAY XM UPR GI TRC 1CNTRST: CPT

## 2024-05-21 RX ADMIN — DIATRIZOATE MEGLUMINE AND DIATRIZOATE SODIUM 120 ML: 660; 100 LIQUID ORAL; RECTAL at 10:57

## 2024-05-21 NOTE — TELEPHONE ENCOUNTER
Discussed with the patient her findings which were within normal limits.  Further discussion with the patient disclosed that she is eating faster than recommended due to her work constraints.  She is working diligently to trying to establish adequate time for herself so that she may consume her meals.  I discussed with the patient the importance of establishing a time where she can focus on her meals.  Standard recommendations about half a cup were explained to the patient, chewing and taking her time.  She is to remember that though it is only a half a cup portion size that portion size is quite large for her sleeve gastrectomy and should not be disrespected that her stomach is smaller than her normal persons after the surgery.  She may start on stage II and advance daily as tolerated.  She is to follow-up accordingly.  I also explained that there is a different sensation now that she has had a sleeve gastrectomy which she needs to recognize as being to full.  She should not eat to the point that she vomits or feelings of nausea.  She should be patient with the process as well.  She did have a sleeve gastrectomy and these behaviors will require time and patient's to learning how to consume her foods.  She is to follow-up with us as needed and/or scheduled.  Patient conveyed understanding and was appreciative of the information and will follow-up accordingly..

## 2024-05-28 ENCOUNTER — OFFICE VISIT (OUTPATIENT)
Dept: BARIATRICS/WEIGHT MGMT | Facility: CLINIC | Age: 39
End: 2024-05-28
Payer: COMMERCIAL

## 2024-05-28 ENCOUNTER — NUTRITION (OUTPATIENT)
Dept: BARIATRICS/WEIGHT MGMT | Facility: CLINIC | Age: 39
End: 2024-05-28
Payer: COMMERCIAL

## 2024-05-28 VITALS
HEART RATE: 89 BPM | TEMPERATURE: 98.6 F | WEIGHT: 227.2 LBS | SYSTOLIC BLOOD PRESSURE: 120 MMHG | DIASTOLIC BLOOD PRESSURE: 73 MMHG | BODY MASS INDEX: 37.85 KG/M2 | HEIGHT: 65 IN | OXYGEN SATURATION: 98 %

## 2024-05-28 DIAGNOSIS — Z98.84 STATUS POST LAPAROSCOPIC SLEEVE GASTRECTOMY: Primary | ICD-10-CM

## 2024-05-28 DIAGNOSIS — E66.9 OBESITY, CLASS II, BMI 35-39.9: ICD-10-CM

## 2024-05-28 PROCEDURE — 99024 POSTOP FOLLOW-UP VISIT: CPT | Performed by: SURGERY

## 2024-05-28 RX ORDER — DIPHENOXYLATE HYDROCHLORIDE AND ATROPINE SULFATE 2.5; .025 MG/1; MG/1
TABLET ORAL DAILY
COMMUNITY

## 2024-05-28 NOTE — PROGRESS NOTES
"Metabolic and Bariatric Surgery Adult Nutrition Assessment    Patient Name: Lottie Plunkett   YOB: 1985   MRN: [id number]     Assessment Date:  2024     Reason for Visit: Follow-up Nutrition Assessment     Treatment Pathway: Postoperative Gastric Sleeve Gastrectomy 1 month.    Assessment    Anthropometrics   Wt Readings from Last 1 Encounters:   24 103 kg (227 lb 3.2 oz)     Ht Readings from Last 1 Encounters:   24 165.1 cm (65\")     BMI Readings from Last 1 Encounters:   24 37.81 kg/m²        Initial Weight/Date: 247.6 lbs (Aug 2023)  Presurgical Weight:  244.2 lbs  Surgery Date: 2024  Weight Changes since last visit: -3.2 lbs  Net Weight Change: -20.4 lbs    Past Medical History:   Diagnosis Date    Allergic rhinitis     Anxiety     Arthritis     Asthma     Chronic pain disorder     TWO BACK SURGERY'S    H/O streptococcal infection     Hyperlipidemia     Neuropathy of leg     Right leg    Obesity     Personal history of COVID-19 2022    Personal history of COVID-19 2023    Sleep apnea     does not wear machine      Past Surgical History:   Procedure Laterality Date     SECTION  2003    DILATION AND CURETTAGE, DIAGNOSTIC / THERAPEUTIC      ENDOMETRIAL ABLATION      ENDOSCOPY N/A 2023    Procedure: ESOPHAGOGASTRODUODENOSCOPY WITH ANESTHESIA;  Surgeon: Adrian Ryan MD;  Location: [facility] ENDOSCOPY;  Service: General;  Laterality: N/A;  pre op:   post op:normal    GASTRIC SLEEVE LAPAROSCOPIC N/A 2024    Procedure: GASTRIC SLEEVE LAPAROSCOPIC WITH DAVINCI ROBOT;  Surgeon: Adrian Ryan MD;  Location: [facility] OR;  Service: Robotics - DaVinci;  Laterality: N/A;    LAMINECTOMY  2013    SPINE SURGERY      Lumbar Laminectomy (12), Lumbar Disc Replacement (2016)      Current Outpatient Medications   Medication Sig Dispense Refill    acetaminophen (TYLENOL) 500 MG tablet Take 1 tablet by mouth 1 (One) Time.      B " Complex Vitamins (Vitamin B Complex) tablet Take 1 tablet by mouth Daily.      BIOTIN PO Take 1 tablet by mouth Daily.      busPIRone (BUSPAR) 15 MG tablet Take 1 tablet by mouth 3 (Three) Times a Day As Needed (anxiety).      calcium citrate-vitamin d (CITRACAL) 200-250 MG-UNIT tablet tablet Take 2 tablets by mouth Daily.      gabapentin (NEURONTIN) 300 MG capsule Take 1 capsule by mouth At Night As Needed (pain).      hydrOXYzine pamoate (VISTARIL) 25 MG capsule Take 1 capsule by mouth Every Night.      multivitamin (MULTIVITAMIN PO) Take  by mouth Daily.      naloxone (NARCAN) 4 MG/0.1ML nasal spray Call 911. Don't prime. [place] in 1 nostril for overdose. Repeat in 2-3 minutes in other nostril if no or minimal breathing/responsiveness. (Patient not taking: Reported on 5/1/2024) 2 each 0    omeprazole (priLOSEC) 20 MG capsule Take 1 capsule by mouth Daily. (Patient not taking: Reported on 5/28/2024) 30 capsule 0    simethicone (Gas-X) 80 MG chewable tablet Chew 0.5 tablets Every 6 (Six) Hours As Needed for Flatulence (belching). (Patient not taking: Reported on 5/20/2024) 30 tablet 1     No current facility-administered medications for this visit.      Allergies   Allergen Reactions    Mangifera Indica Fruit Ext ([place]) [Mangifera Indica] Anaphylaxis    Mushroom Nausea And Vomiting        Nutrition Recall  Eating 3 meals daily   Food journal reviewed.  Snacking - no.   Monitoring portions- yes.  Calculating Protein- yes ~60 grams daily.  Drinking sugary/carbonated beverages- none.   Fluid Intake- setting alarms every 2 hrs to help with fluid-   Supplement Intake- yes 2 [place]reme[place] protein shakes daily.   MVI/B12/Calcium Citrate/Vitamin D- orally.     Success this Month: has good support at home.  Barriers: recognizes eating too fast. Experiencing constipation ~ once daily.     Exercise: is walking.    Nutrition Intervention  Nutrition education for morbid obesity s/p sleeve gastrectomy. Nutrition coaching and intensive  behavioral therapy for behavior change provided.  Strategies used included Comprehensive education, Motivational Interviewing , Problem Solving, and Ongoing reinforcement  Reviewed nutritional needs for Postoperative Gastric Sleeve Gastrectomy 1 month . Including but not limited to daily bariatric or multi-vitamin daily, B12, Calcium Citrate with Vitamin D, Eat 4 meals per day at 1/2 c portions, , Half of all meals should be servings of vegetables., Protein goal: 65gms., 1 protein shake daily (discussed guidelines of compliant shakes), Eliminate snacks., Reduce fat, sugar, and/or salt in food choices., Choose more nutrient dense foods., Choose foods with increased fiber., Monitor portion sizes using measuring cups., Eliminate soda and sugar-sweetened beverages, and Increase fluid intake to 64 ounces per day   Self-monitoring strategies such as keeping a food journal (on paper or electronically) and calculating fluid/protein intake were discussed.  Recommend increasing physical activity, beyond normal daily habits, gradually working to reach ~30 minutes daily.      Goals  1. 4 meals/day at 1/4 cup (work up to 1/2 cup meals)   2. 25 grams fiber.   3. Increase fluid- reduce protein shake to once daily.     Monitoring/Evaluation Plan  Anticipate follow up in 2 months. Continue collaboration of care with physician and treatment team.     Time Spent 20 minutes    Electronically signed by  Mckenzie Walsh RDN, LD  05/28/2024 09:48 CDT.

## 2024-05-28 NOTE — PROGRESS NOTES
"  Patient Care Team:  Shaunna Choudhury APRN as PCP - General (Otolaryngology)    Subjective     Lottie Plunkett is a 38 y.o. female.     Lottie is post op 1 month from her sleeve gastrectomy procedure.  She is currently on her regular diet.  Patient states she is consuming between 40 to 60 ounces of water daily.  She is consuming 4 meals a day and measuring them routinely.  Her symptoms have improved with respect to her abdominal pain postprandial.  She is taking her time and chewing her food more consistently.  She has had bowel movements.    Review Of Systems:  General ROS: negative  Respiratory ROS: no cough, shortness of breath, or wheezing  Cardiovascular ROS: no chest pain or dyspnea on exertion  Gastrointestinal ROS: no abdominal pain, change in bowel habits, or black or bloody stools    The following portions of the patient's history were reviewed and updated as appropriate: allergies, current medications, past family history, past medical history, past social history, past surgical history, and problem list.    Objective   /73 (BP Location: Right arm, Patient Position: Sitting, Cuff Size: Adult)   Pulse 89   Temp 98.6 °F (37 °C)   Ht 165.1 cm (65\")   Wt 103 kg (227 lb 3.2 oz)   SpO2 98%   BMI 37.81 kg/m²       05/28/24  0924   Weight: 103 kg (227 lb 3.2 oz)        General Appearance:  awake, alert, oriented, in no acute distress  Abdomen:  Soft, non-tender, normal bowel sounds; no bruits, organomegaly or masses.  Abnormal shape: obese  Wounds: clean, dry, intact    ASSESSMENT/ PLAN    Encounter Diagnoses   Name Primary?    Status post laparoscopic sleeve gastrectomy Yes     Lottie Plunkett and I discussed the importance of changing behavior for consistent and successful weight loss.  We first reviewed again the definition of a meal which is defined as portion sizes less than a half a cup and those portions incorporating a vegetable.  Specifically the vegetable should fill half " of that volume.  I also explained that she should attempt to consume 4 meals as defined above daily and to avoid snacking or grazing.  She should also be mindful of adequate hydration by consuming at least 64 oz of water daily and incorporation of a regular exercise regimen.     I discussed the importance of taking her multivitamins as directed.  We discussed the importance to be aware of foods that might have an addictive component with their behavior.  Those that are considered addictive based off of how they are consumed should be avoided.  Specifically if there is limited nutritional value and the food choice it should be avoided and substituted with something that would provide more nutrition.    She is to return to our office 2 months or as needed.    05/28/24  12:29 CDT  Patient Care Team:  Shaunna Choudhury APRN as PCP - General (Otolaryngology)  Adrian Ryan MD FACS

## 2024-05-31 ENCOUNTER — DOCUMENTATION (OUTPATIENT)
Dept: BARIATRICS/WEIGHT MGMT | Facility: CLINIC | Age: 39
End: 2024-05-31
Payer: COMMERCIAL

## 2024-05-31 RX ORDER — CEPHALEXIN 500 MG/1
500 CAPSULE ORAL 2 TIMES DAILY
Qty: 14 CAPSULE | Refills: 0 | Status: SHIPPED | OUTPATIENT
Start: 2024-05-31 | End: 2024-06-07

## 2024-06-03 ENCOUNTER — OFFICE VISIT (OUTPATIENT)
Dept: BARIATRICS/WEIGHT MGMT | Facility: CLINIC | Age: 39
End: 2024-06-03
Payer: COMMERCIAL

## 2024-06-03 VITALS
HEIGHT: 65 IN | WEIGHT: 225.2 LBS | HEART RATE: 97 BPM | BODY MASS INDEX: 37.52 KG/M2 | SYSTOLIC BLOOD PRESSURE: 128 MMHG | TEMPERATURE: 98.6 F | DIASTOLIC BLOOD PRESSURE: 85 MMHG | OXYGEN SATURATION: 98 %

## 2024-06-03 DIAGNOSIS — Z98.890 STATUS POST INCISION AND DRAINAGE: Primary | ICD-10-CM

## 2024-06-03 PROCEDURE — 99024 POSTOP FOLLOW-UP VISIT: CPT | Performed by: NURSE PRACTITIONER

## 2024-06-03 NOTE — PROGRESS NOTES
Bariatric Surgery- S/P sleeve gastrectomy 2024   History and Physical     Referring Provider: No ref. provider found    Patient Care Team:  Shaunna Choudhury APRN as PCP - General (Otolaryngology)    Chief complaint: Discharge from wound x 2     Subjective      History of present illness:  The patient is a 39 y.o. female who is a little over 1 month postop from a sleeve gastrectomy with da Leonardo robotic assist.  On Friday she contacted the answering service due to an increase of drainage from her incision sites.  She stated that the incision sites have been occurring for about 3 days and a foul odor began on Friday.  She was began on oral antibiotic and states the smell is improving.  She also admits to rash around multiple incision sites.  She is using hydrocortisone cream around the incision sites.    Review of Systems    Review of Systems - General ROS: negative    Dermatological ROS: positive for rash and drainage from incision sites        History  Past Medical History:   Diagnosis Date    Allergic rhinitis     Anxiety     Arthritis     Asthma     Chronic pain disorder     TWO BACK SURGERY'S    H/O streptococcal infection     Hyperlipidemia     Neuropathy of leg     Right leg    Obesity     Personal history of COVID-19 2022    Personal history of COVID-19 2023    Sleep apnea     does not wear machine   ,   Past Surgical History:   Procedure Laterality Date     SECTION  2003    DILATION AND CURETTAGE, DIAGNOSTIC / THERAPEUTIC      ENDOMETRIAL ABLATION      ENDOSCOPY N/A 2023    Procedure: ESOPHAGOGASTRODUODENOSCOPY WITH ANESTHESIA;  Surgeon: Adrian Ryan MD;  Location: [facility] ENDOSCOPY;  Service: General;  Laterality: N/A;  pre op:   post op:normal    GASTRIC SLEEVE LAPAROSCOPIC N/A 2024    Procedure: GASTRIC SLEEVE LAPAROSCOPIC WITH DAVINCI ROBOT;  Surgeon: Adrian Ryan MD;  Location: [facility] OR;  Service: Robotics - DaVinci;  Laterality: N/A;    LAMINECTOMY   2013    SPINE SURGERY      Lumbar Laminectomy (12), Lumbar Disc Replacement (2016)   ,   Family History   Problem Relation Age of Onset    Obesity Mother     Cancer Father     Obesity Paternal Grandmother     Stroke Paternal Grandmother    ,   Social History     Tobacco Use    Smoking status: Former     Current packs/day: 0.00     Average packs/day: 0.3 packs/day for 11.0 years (2.8 ttl pk-yrs)     Types: Cigarettes     Start date: 2012     Quit date: 2023     Years since quittin.5    Smokeless tobacco: Never    Tobacco comments:     vapes 5ml; quit vaping last visit   Vaping Use    Vaping status: Former    Quit date: 10/1/2023   Substance Use Topics    Alcohol use: Yes     Alcohol/week: 2.0 standard drinks of alcohol     Types: 2 Cans of beer per week     Comment: occasionally    Drug use: No   , (Not in a hospital admission)   and Allergies:  Mangifera indica fruit ext (willard) [mangifera indica] and Mushroom    Current Outpatient Medications:     acetaminophen (TYLENOL) 500 MG tablet, Take 1 tablet by mouth 1 (One) Time., Disp: , Rfl:     busPIRone (BUSPAR) 15 MG tablet, Take 1 tablet by mouth 3 (Three) Times a Day As Needed (anxiety)., Disp: , Rfl:     calcium citrate-vitamin d (CITRACAL) 200-250 MG-UNIT tablet tablet, Take 2 tablets by mouth Daily., Disp: , Rfl:     cephalexin (Keflex) 500 MG capsule, Take 1 capsule by mouth 2 (Two) Times a Day for 7 days., Disp: 14 capsule, Rfl: 0    multivitamin (MULTIVITAMIN PO), Take  by mouth Daily., Disp: , Rfl:     omeprazole (priLOSEC) 20 MG capsule, Take 1 capsule by mouth Daily., Disp: 30 capsule, Rfl: 0    simethicone (Gas-X) 80 MG chewable tablet, Chew 0.5 tablets Every 6 (Six) Hours As Needed for Flatulence (belching)., Disp: 30 tablet, Rfl: 1    Objective     Vital Signs   Temp:  [98.6 °F (37 °C)] 98.6 °F (37 °C)  Heart Rate:  [97] 97  BP: (128)/(85) 128/85    Physical Exam:  Physical Exam  Constitutional:       Appearance: She is  obese.   Abdominal:      General: Bowel sounds are normal.      Palpations: Abdomen is soft.      Comments: Lap x 6, there is a raised pruritic rash around all incision sites, worse to the LLQ. Drainage is mild yellow in color.    Neurological:      Mental Status: She is alert.          Results Review:     Lab Results (last 24 hours)       ** No results found for the last 24 hours. **          Imaging Results (Last 24 Hours)       ** No results found for the last 24 hours. **            ASSESSMENT/PLAN   Diagnosis Plan   1. Status post incision and drainage      Cleansed area. Continue abx           * No active hospital problems. *    Begin over-the-counter yeast medication for inside umbilicus.  Okay to try Benadryl anti-itch cream and take Benadryl at bedtime.  Also advised to begin Zyrtec daily for at least 1 week to assist with contact reaction likely to adhesive.  Advised to keep incisions open to air is much as possible but at that time and when wearing a shirt cover areas that are draining.  Patient verbalizes understanding.    JUANA Cotto  06/03/24  09:30 CDT

## 2024-06-19 RX ORDER — ERGOCALCIFEROL 1.25 MG/1
50000 CAPSULE ORAL
Qty: 24 CAPSULE | Refills: 1 | Status: SHIPPED | OUTPATIENT
Start: 2024-06-20

## 2024-07-22 ENCOUNTER — OFFICE VISIT (OUTPATIENT)
Dept: PRIMARY CARE CLINIC | Age: 39
End: 2024-07-22
Payer: COMMERCIAL

## 2024-07-22 VITALS
TEMPERATURE: 97.8 F | BODY MASS INDEX: 35.96 KG/M2 | HEIGHT: 65 IN | OXYGEN SATURATION: 99 % | WEIGHT: 215.8 LBS | SYSTOLIC BLOOD PRESSURE: 130 MMHG | HEART RATE: 87 BPM | DIASTOLIC BLOOD PRESSURE: 84 MMHG

## 2024-07-22 DIAGNOSIS — F41.9 ANXIETY: Chronic | ICD-10-CM

## 2024-07-22 DIAGNOSIS — Z00.00 ENCOUNTER FOR ANNUAL PHYSICAL EXAM: Primary | ICD-10-CM

## 2024-07-22 PROCEDURE — 99396 PREV VISIT EST AGE 40-64: CPT | Performed by: NURSE PRACTITIONER

## 2024-07-22 RX ORDER — BUSPIRONE HYDROCHLORIDE 15 MG/1
15 TABLET ORAL 3 TIMES DAILY PRN
COMMUNITY
Start: 2023-10-02

## 2024-07-22 RX ORDER — BUSPIRONE HYDROCHLORIDE 15 MG/1
15 TABLET ORAL 3 TIMES DAILY
Qty: 90 TABLET | Refills: 1 | Status: SHIPPED | OUTPATIENT
Start: 2024-07-22

## 2024-07-22 ASSESSMENT — PATIENT HEALTH QUESTIONNAIRE - PHQ9
1. LITTLE INTEREST OR PLEASURE IN DOING THINGS: SEVERAL DAYS
SUM OF ALL RESPONSES TO PHQ9 QUESTIONS 1 & 2: 2
2. FEELING DOWN, DEPRESSED OR HOPELESS: SEVERAL DAYS

## 2024-07-22 ASSESSMENT — ENCOUNTER SYMPTOMS
COUGH: 0
RHINORRHEA: 0
BACK PAIN: 0
NAUSEA: 0
VOMITING: 0
COLOR CHANGE: 0
VOICE CHANGE: 0
PHOTOPHOBIA: 0
SHORTNESS OF BREATH: 0

## 2024-07-22 NOTE — PROGRESS NOTES
Review Result            Urine Drug Screenings (1 yr)    No resulted procedures found.       Medication Contract and Consent for Opioid Use Documents Filed        No documents found                     Patient given educational materials -see patient instructions.  Discussed use, benefit, and side effects of prescribed medications.  All patient questions answered.  Pt voiced understanding. Reviewed health maintenance.  Instructed to continue currentmedications, diet and exercise.  Patient agreed with treatment plan. Follow up as directed.   MEDICATIONS:  Orders Placed This Encounter   Medications    busPIRone (BUSPAR) 15 MG tablet     Sig: Take 15 mg by mouth 3 times daily     Dispense:  90 tablet     Refill:  1         ORDERS:  Orders Placed This Encounter   Procedures    Vitamin D 25 Hydroxy    Lipid Panel    Hemoglobin A1C    Comprehensive Metabolic Panel    TSH    CBC with Auto Differential       Follow-up:  No follow-ups on file.    PATIENT INSTRUCTIONS:  There are no Patient Instructions on file for this visit.  Electronically signed by JOSELINE Johnston on 7/22/2024 at 9:23 AM    EMR Dragon/transcription disclaimer:  Much of thisencounter note is electronic transcription/translation of spoken language to printed texts.  The electronic translation of spoken language may be erroneous, or at times, nonsensical words or phrases may be inadvertentlytranscribed.  Although I have reviewed the note for such errors, some may still exist.

## 2024-07-26 ENCOUNTER — NUTRITION (OUTPATIENT)
Dept: BARIATRICS/WEIGHT MGMT | Facility: CLINIC | Age: 39
End: 2024-07-26
Payer: COMMERCIAL

## 2024-07-26 VITALS — BODY MASS INDEX: 36.06 KG/M2 | WEIGHT: 216.4 LBS | HEIGHT: 65 IN

## 2024-07-26 DIAGNOSIS — E66.01 CLASS 2 SEVERE OBESITY DUE TO EXCESS CALORIES WITH SERIOUS COMORBIDITY AND BODY MASS INDEX (BMI) OF 35.0 TO 35.9 IN ADULT: Primary | ICD-10-CM

## 2024-07-26 DIAGNOSIS — Z98.84 STATUS POST LAPAROSCOPIC SLEEVE GASTRECTOMY: ICD-10-CM

## 2024-07-26 RX ORDER — ERGOCALCIFEROL 1.25 MG/1
50000 CAPSULE ORAL WEEKLY
COMMUNITY

## 2024-07-26 NOTE — PROGRESS NOTES
"Metabolic and Bariatric Surgery Adult Nutrition Assessment    Patient Name: Lottie Plunkett   YOB: 1985   MRN: [id number]     Assessment Date:  2024     Reason for Visit: Follow-up Nutrition Assessment     Treatment Pathway: Postoperative Gastric Sleeve Gastrectomy 3 mo    Assessment    Anthropometrics   Wt Readings from Last 1 Encounters:   24 98.2 kg (216 lb 6.4 oz)     Ht Readings from Last 1 Encounters:   24 165.1 cm (65\")     BMI Readings from Last 1 Encounters:   24 36.01 kg/m²        Initial Weight/Date: 247.6 lbs (Aug 2023)  Presurgical Weight:  244.2 lbs  Surgery Date: 2024  Weight Changes since last visit: -8.8 lbs  Net Weight Change: -31.2 lbs    Past Medical History:   Diagnosis Date    Allergic rhinitis     Anxiety     Arthritis     Asthma     Chronic pain disorder     TWO BACK SURGERY'S    H/O streptococcal infection     Hyperlipidemia     Neuropathy of leg     Right leg    Obesity     Personal history of COVID-19 2022    Personal history of COVID-19 2023    Sleep apnea     does not wear machine      Past Surgical History:   Procedure Laterality Date     SECTION  2003    DILATION AND CURETTAGE, DIAGNOSTIC / THERAPEUTIC      ENDOMETRIAL ABLATION      ENDOSCOPY N/A 2023    Procedure: ESOPHAGOGASTRODUODENOSCOPY WITH ANESTHESIA;  Surgeon: Adrian Ryan MD;  Location: [facility] ENDOSCOPY;  Service: General;  Laterality: N/A;  pre op:   post op:normal    GASTRIC SLEEVE LAPAROSCOPIC N/A 2024    Procedure: GASTRIC SLEEVE LAPAROSCOPIC WITH DAVINCI ROBOT;  Surgeon: Adrian Ryan MD;  Location: [facility] OR;  Service: Robotics - DaVinci;  Laterality: N/A;    LAMINECTOMY  2013    SPINE SURGERY      Lumbar Laminectomy (13), Lumbar Disc Replacement (2016)      Current Outpatient Medications   Medication Sig Dispense Refill    B Complex Vitamins (VITAMIN B COMPLEX PO) Take  by mouth.      busPIRone (BUSPAR) 15 " MG tablet Take 1 tablet by mouth 3 (Three) Times a Day As Needed (anxiety).      calcium citrate-vitamin d (CITRACAL) 200-250 MG-UNIT tablet tablet Take 2 tablets by mouth Daily.      Ferrous Sulfate (IRON PO) Take  by mouth.      multivitamin (MULTIVITAMIN PO) Take  by mouth Daily.      omeprazole (priLOSEC) 20 MG capsule Take 1 capsule by mouth Daily. 30 capsule 0    simethicone (Gas-X) 80 MG chewable tablet Chew 0.5 tablets Every 6 (Six) Hours As Needed for Flatulence (belching). 30 tablet 1    SIMVASTATIN PO Take  by mouth.      vitamin D (ERGOCALCIFEROL) 1.25 MG (28053 UT) capsule capsule Take 1 capsule by mouth 1 (One) Time Per Week.      acetaminophen (TYLENOL) 500 MG tablet Take 1 tablet by mouth 1 (One) Time.       No current facility-administered medications for this visit.      Allergies   Allergen Reactions    Mangifera Indica Fruit Ext (Ridge) [Mangifera Indica] Anaphylaxis    Mushroom Nausea And Vomiting          Nutrition Recall  Eating 3 meals daily; many times work schedule is not allowing a 4th meal.   Snacking - not snacking.   Monitoring portions- is still measuring.   Calculating Protein- estimating 60 grams daily; currently not tracking food intake to calculate  Drinking sugary/carbonated beverages- none  Fluid Intake- 64+ oz daily  Supplement Intake- at least one, sometimes two protein supplements daily. 30 grams protein (drinks from 8-10 am)   MVI/B12/Calcium Citrate/Vitamin D- is taking multivitamins with iron,       Barriers: significantly struggling with anxiety and disordered eating pattern per Pt; triggered by tracking food and weighing daily. She continues to weigh daily and finds it difficult to not focus on the scale number. She states she started some very negative habits around food, including working toward limiting her calorie intake as much as possible to create an environment for weight loss; she was trying to eat less than 300 kcal/day at times. States she had not had a period  "since March until starting yesterday. She has worked with her therapist and PCP; They have implemented not tracking food and a \"one bite\" rule- allowing her to disassociate from the triggered feeling of tracking foods, calories, etc. AND giving her freedom of having 1 bite of whatever she would like at meal times.   Admits she's not able to get a regular lunch break at work; often works 10-12 hours in a day.  Her work environment has not been supportive of her putting her health first and taking time for meals.     Nutrition Intervention  Nutrition education for morbid obesity s/p sleeve gastrectomy. Nutrition coaching and intensive behavioral therapy for behavior change provided.  Strategies used included Comprehensive education, Motivational Interviewing , Problem Solving, Ongoing reinforcement, and Provided sample menus  Reviewed nutritional needs for Postoperative Gastric Sleeve Gastrectomy 3 months . Including but not limited to daily bariatric or multi-vitamin daily, B12, Calcium Citrate with Vitamin D, Eat 4 meals per day at 1/2 c portions, , Half of all meals should be servings of vegetables., Protein goal: 65 gms., 1 protein shake daily (discussed guidelines of compliant shakes), Monitor portion sizes using measuring cups., and avoid grazing between meals.    Self-monitoring strategies such as keeping a food journal (on paper or electronically) and calculating fluid/protein intake were discussed.  Recommend increasing physical activity, beyond normal daily habits, gradually working to reach ~30 minutes daily.      Goals  1. Continue to measure portions and continue one bite rule to be apart of her 1/2 cup meals.  2. Stop weighing daily.   3. Have 4 meals daily with a protein supplement.     Monitoring/Evaluation Plan  Anticipate follow up in 3 months. Continue collaboration of care with physician and treatment team.     Time Spent 20 minutes    Electronically signed by  Mckenzie Walsh RDN, SHAY  07/26/2024 " 09:10 CDT.

## 2024-08-30 ENCOUNTER — OFFICE VISIT (OUTPATIENT)
Dept: PRIMARY CARE CLINIC | Age: 39
End: 2024-08-30
Payer: COMMERCIAL

## 2024-08-30 ENCOUNTER — HOSPITAL ENCOUNTER (OUTPATIENT)
Dept: GENERAL RADIOLOGY | Age: 39
Discharge: HOME | End: 2024-08-30
Payer: COMMERCIAL

## 2024-08-30 VITALS
OXYGEN SATURATION: 98 % | HEIGHT: 65 IN | TEMPERATURE: 97.8 F | HEART RATE: 76 BPM | DIASTOLIC BLOOD PRESSURE: 76 MMHG | WEIGHT: 212 LBS | SYSTOLIC BLOOD PRESSURE: 134 MMHG | BODY MASS INDEX: 35.32 KG/M2

## 2024-08-30 DIAGNOSIS — S89.92XA INJURY OF LEFT LOWER EXTREMITY, INITIAL ENCOUNTER: ICD-10-CM

## 2024-08-30 DIAGNOSIS — S89.92XA INJURY OF LEFT LOWER EXTREMITY, INITIAL ENCOUNTER: Primary | ICD-10-CM

## 2024-08-30 PROCEDURE — 99213 OFFICE O/P EST LOW 20 MIN: CPT | Performed by: NURSE PRACTITIONER

## 2024-08-30 PROCEDURE — 73562 X-RAY EXAM OF KNEE 3: CPT

## 2024-08-30 PROCEDURE — 73590 X-RAY EXAM OF LOWER LEG: CPT

## 2024-08-30 ASSESSMENT — ENCOUNTER SYMPTOMS
PHOTOPHOBIA: 0
BACK PAIN: 0
NAUSEA: 0
SHORTNESS OF BREATH: 0
VOICE CHANGE: 0
COUGH: 0
COLOR CHANGE: 0
VOMITING: 0
RHINORRHEA: 0

## 2024-08-30 NOTE — PROGRESS NOTES
[facility]  [address]  [address]  [place] [address]  Dept: [phone]  Dept Fax: [phone]  Loc: [phone]    Opal Trucios is a 39 y.o. female who presents today for her medical conditions/complaints as noted below.  Opal Turcios is c/o of Leg Injury (Pt in office with left leg injury - fell while on ladder in pool. Ladder broke and hit the ground in shin. Injury was 4 weeks ago, swollen, no bruising, feels warm. Stated that there was a snap the next day that relived pain some but pain came back and persists as well as tender to the touch)        HPI:     HPI   Chief Complaint   Patient presents with    Leg Injury     Pt in office with left leg injury - fell while on ladder in pool. Ladder broke and hit the ground in shin. Injury was 4 weeks ago, swollen, no bruising, feels warm. Stated that there was a snap the next day that relived pain some but pain came back and persists as well as tender to the touch     Patient presents today for evaluation of left leg pain x 4 weeks. She states she had crushing injury to left shin on a pool ladder. She is able to fully bear weight. It is very painful when touched. There is no bruising or erythema. She has not taken any medication for pain.       Past Medical History:   Diagnosis Date    Anxiety     Chronic back pain     Hyperlipidemia     OCD (obsessive compulsive disorder)       Past Surgical History:   Procedure Laterality Date     SECTION      DILATION AND CURETTAGE OF UTERUS N/A 10/27/2023    HYSTEROSCOPY, ENDOMETRIAL ABLATION WITH NOVOSURE, DILATATION AND CURETTAGE performed by Kendra Patel MD at [facility] OR    LUMBAR DISCECTOMY      LUMBAR LAMINECTOMY             2024    10:46 AM 2024     8:22 AM 10/27/2023    10:42 AM 10/27/2023    10:41 AM 10/27/2023    10:25 AM 10/27/2023    10:11 AM   Vitals   SYSTOLIC 134 130 134  140 116   DIASTOLIC 76 84 83  67 64   Site  Left Upper    Review of Systems   Constitutional:  Negative for chills and fever.   HENT:  Negative for ear pain, hearing loss, rhinorrhea and voice change.    Eyes:  Negative for photophobia and visual disturbance.   Respiratory:  Negative for cough and shortness of breath.    Cardiovascular:  Negative for chest pain and palpitations.   Gastrointestinal:  Negative for nausea and vomiting.   Endocrine: Negative.  Negative for cold intolerance and heat intolerance.   Genitourinary:  Negative for difficulty urinating and flank pain.   Musculoskeletal:  Negative for back pain and neck pain.   Skin:  Negative for color change and rash.   Allergic/Immunologic: Negative for environmental allergies and food allergies.   Neurological:  Negative for dizziness, speech difficulty and headaches.   Hematological:  Does not bruise/bleed easily.   Psychiatric/Behavioral:  Negative for sleep disturbance and suicidal ideas.        Objective:     Physical Exam  Vitals and nursing note reviewed.   Constitutional:       Appearance: She is well-developed.   HENT:      Head: Atraumatic.      Right Ear: External ear normal.      Left Ear: External ear normal.      Nose: Nose normal.   Eyes:      Conjunctiva/sclera: Conjunctivae normal.      Pupils: Pupils are equal, round, and reactive to light.   Cardiovascular:      Rate and Rhythm: Normal rate and regular rhythm.      Heart sounds: Normal heart sounds, S1 normal and S2 normal.   Pulmonary:      Effort: Pulmonary effort is normal.      Breath sounds: Normal breath sounds.   Abdominal:      General: Bowel sounds are normal.      Palpations: Abdomen is soft.   Musculoskeletal:         General: Normal range of motion.      Cervical back: Normal range of motion and neck supple.        Legs:    Skin:     General: Skin is warm and dry.   Neurological:      Mental Status: She is alert and oriented to person, place, and time.   Psychiatric:         Behavior: Behavior normal.       /76   Pulse 76

## 2024-09-12 RX ORDER — BUSPIRONE HYDROCHLORIDE 15 MG/1
15 TABLET ORAL 3 TIMES DAILY
Qty: 90 TABLET | Refills: 1 | Status: SHIPPED | OUTPATIENT
Start: 2024-09-12

## 2024-09-13 DIAGNOSIS — Z00.00 ENCOUNTER FOR ANNUAL PHYSICAL EXAM: ICD-10-CM

## 2024-09-13 LAB
25(OH)D3 SERPL-MCNC: 68.3 NG/ML
ALBUMIN SERPL-MCNC: 3.9 G/DL (ref 3.5–5.2)
ALP SERPL-CCNC: 72 U/L (ref 35–104)
ALT SERPL-CCNC: 19 U/L (ref 5–33)
ANION GAP SERPL CALCULATED.3IONS-SCNC: 11 MMOL/L (ref 7–19)
AST SERPL-CCNC: 13 U/L (ref 5–32)
BASOPHILS # BLD: 0.1 K/UL (ref 0–0.2)
BASOPHILS NFR BLD: 0.9 % (ref 0–1)
BILIRUB SERPL-MCNC: 0.2 MG/DL (ref 0.2–1.2)
BUN SERPL-MCNC: 16 MG/DL (ref 6–20)
CALCIUM SERPL-MCNC: 9.3 MG/DL (ref 8.6–10)
CHLORIDE SERPL-SCNC: 106 MMOL/L (ref 98–111)
CHOLEST SERPL-MCNC: 198 MG/DL (ref 0–199)
CO2 SERPL-SCNC: 25 MMOL/L (ref 22–29)
CREAT SERPL-MCNC: 0.5 MG/DL (ref 0.5–0.9)
EOSINOPHIL # BLD: 0.2 K/UL (ref 0–0.6)
EOSINOPHIL NFR BLD: 3.1 % (ref 0–5)
ERYTHROCYTE [DISTWIDTH] IN BLOOD BY AUTOMATED COUNT: 12.7 % (ref 11.5–14.5)
GLUCOSE SERPL-MCNC: 101 MG/DL (ref 70–99)
HBA1C MFR BLD: 5.5 % (ref 4–5.6)
HCT VFR BLD AUTO: 39.9 % (ref 37–47)
HDLC SERPL-MCNC: 55 MG/DL (ref 40–60)
HGB BLD-MCNC: 12.6 G/DL (ref 12–16)
IMM GRANULOCYTES # BLD: 0 K/UL
LDLC SERPL CALC-MCNC: 120 MG/DL
LYMPHOCYTES # BLD: 2.2 K/UL (ref 1.1–4.5)
LYMPHOCYTES NFR BLD: 37.4 % (ref 20–40)
MCH RBC QN AUTO: 29 PG (ref 27–31)
MCHC RBC AUTO-ENTMCNC: 31.6 G/DL (ref 33–37)
MCV RBC AUTO: 91.7 FL (ref 81–99)
MONOCYTES # BLD: 0.4 K/UL (ref 0–0.9)
MONOCYTES NFR BLD: 7.3 % (ref 0–10)
NEUTROPHILS # BLD: 2.9 K/UL (ref 1.5–7.5)
NEUTS SEG NFR BLD: 51 % (ref 50–65)
PLATELET # BLD AUTO: 332 K/UL (ref 130–400)
PMV BLD AUTO: 9.7 FL (ref 9.4–12.3)
POTASSIUM SERPL-SCNC: 4.3 MMOL/L (ref 3.5–5)
PROT SERPL-MCNC: 7.2 G/DL (ref 6.4–8.3)
RBC # BLD AUTO: 4.35 M/UL (ref 4.2–5.4)
SODIUM SERPL-SCNC: 142 MMOL/L (ref 136–145)
TRIGL SERPL-MCNC: 114 MG/DL (ref 0–149)
TSH SERPL DL<=0.005 MIU/L-ACNC: 3.23 UIU/ML (ref 0.27–4.2)
WBC # BLD AUTO: 5.8 K/UL (ref 4.8–10.8)

## 2024-09-18 ENCOUNTER — TELEPHONE (OUTPATIENT)
Dept: PRIMARY CARE CLINIC | Age: 39
End: 2024-09-18

## 2024-11-05 NOTE — TELEPHONE ENCOUNTER
Opal Turcios called to request a refill on her medication.      Last office visit : 8/30/2024   Next office visit : Visit date not found     Requested Prescriptions     Pending Prescriptions Disp Refills    busPIRone (BUSPAR) 15 MG tablet [Pharmacy Med Name: BUSPIRONE 15MG TABLETS] 90 tablet 1     Sig: TAKE 1 TABLET BY MOUTH THREE TIMES DAILY            Samira Masterson MA

## 2024-11-06 RX ORDER — BUSPIRONE HYDROCHLORIDE 15 MG/1
15 TABLET ORAL 3 TIMES DAILY
Qty: 90 TABLET | Refills: 1 | Status: SHIPPED | OUTPATIENT
Start: 2024-11-06

## 2024-11-22 RX ORDER — ERGOCALCIFEROL 1.25 MG/1
CAPSULE, LIQUID FILLED ORAL
Qty: 24 CAPSULE | Refills: 1 | Status: SHIPPED | OUTPATIENT
Start: 2024-11-22

## 2024-12-04 ENCOUNTER — HOSPITAL ENCOUNTER (OUTPATIENT)
Dept: WOMENS IMAGING | Age: 39
Discharge: HOME | End: 2024-12-04
Payer: COMMERCIAL

## 2024-12-04 VITALS — BODY MASS INDEX: 35.85 KG/M2 | WEIGHT: 210 LBS | HEIGHT: 64 IN

## 2024-12-04 DIAGNOSIS — Z12.31 ENCOUNTER FOR SCREENING MAMMOGRAM FOR MALIGNANT NEOPLASM OF BREAST: ICD-10-CM

## 2024-12-04 PROCEDURE — 77063 BREAST TOMOSYNTHESIS BI: CPT

## 2024-12-12 RX ORDER — METHYLPREDNISOLONE 4 MG/1
TABLET ORAL
Qty: 1 KIT | Refills: 0 | Status: SHIPPED | OUTPATIENT
Start: 2024-12-12 | End: 2024-12-18

## 2024-12-12 NOTE — TELEPHONE ENCOUNTER
Opal Turcios called to request a refill on her medication.      Last office visit : 8/30/2024   Next office visit : Visit date not found     Requested Prescriptions     Pending Prescriptions Disp Refills    tiZANidine (ZANAFLEX) 4 MG tablet 30 tablet 0     Sig: Take 1 tablet by mouth 3 times daily as needed (for pain)    methylPREDNISolone (MEDROL DOSEPACK) 4 MG tablet 1 kit 0     Sig: Take by mouth.            Samira Masterson MA

## 2025-01-05 RX ORDER — BUSPIRONE HYDROCHLORIDE 15 MG/1
15 TABLET ORAL 3 TIMES DAILY
Qty: 90 TABLET | Refills: 2 | Status: SHIPPED | OUTPATIENT
Start: 2025-01-05

## 2025-03-20 RX ORDER — BUSPIRONE HYDROCHLORIDE 15 MG/1
15 TABLET ORAL 3 TIMES DAILY
Qty: 90 TABLET | Refills: 0 | Status: SHIPPED | OUTPATIENT
Start: 2025-03-20

## 2025-03-20 NOTE — TELEPHONE ENCOUNTER
Opal Turcios called to request a refill on her medication.      Last office visit : 8/30/2024   Next office visit : Visit date not found     Requested Prescriptions     Pending Prescriptions Disp Refills    busPIRone (BUSPAR) 15 MG tablet [Pharmacy Med Name: BUSPIRONE 15MG TABLETS] 90 tablet 0     Sig: TAKE ONE TABLET BY MOUTH THREE TIMES DAILY            Samira Masterson MA

## 2025-06-26 ASSESSMENT — PATIENT HEALTH QUESTIONNAIRE - PHQ9
1. LITTLE INTEREST OR PLEASURE IN DOING THINGS: NOT AT ALL
SUM OF ALL RESPONSES TO PHQ QUESTIONS 1-9: 0
2. FEELING DOWN, DEPRESSED OR HOPELESS: NOT AT ALL
SUM OF ALL RESPONSES TO PHQ QUESTIONS 1-9: 0
SUM OF ALL RESPONSES TO PHQ9 QUESTIONS 1 & 2: 0
SUM OF ALL RESPONSES TO PHQ QUESTIONS 1-9: 0
SUM OF ALL RESPONSES TO PHQ QUESTIONS 1-9: 0
1. LITTLE INTEREST OR PLEASURE IN DOING THINGS: NOT AT ALL
2. FEELING DOWN, DEPRESSED OR HOPELESS: NOT AT ALL

## 2025-06-27 SDOH — ECONOMIC STABILITY: FOOD INSECURITY: WITHIN THE PAST 12 MONTHS, THE FOOD YOU BOUGHT JUST DIDN'T LAST AND YOU DIDN'T HAVE MONEY TO GET MORE.: NEVER TRUE

## 2025-06-27 SDOH — ECONOMIC STABILITY: INCOME INSECURITY: IN THE LAST 12 MONTHS, WAS THERE A TIME WHEN YOU WERE NOT ABLE TO PAY THE MORTGAGE OR RENT ON TIME?: NO

## 2025-06-27 SDOH — ECONOMIC STABILITY: FOOD INSECURITY: WITHIN THE PAST 12 MONTHS, YOU WORRIED THAT YOUR FOOD WOULD RUN OUT BEFORE YOU GOT MONEY TO BUY MORE.: NEVER TRUE

## 2025-06-27 SDOH — ECONOMIC STABILITY: TRANSPORTATION INSECURITY
IN THE PAST 12 MONTHS, HAS THE LACK OF TRANSPORTATION KEPT YOU FROM MEDICAL APPOINTMENTS OR FROM GETTING MEDICATIONS?: NO

## 2025-06-30 ENCOUNTER — OFFICE VISIT (OUTPATIENT)
Dept: PRIMARY CARE CLINIC | Age: 40
End: 2025-06-30
Payer: COMMERCIAL

## 2025-06-30 ENCOUNTER — RESULTS FOLLOW-UP (OUTPATIENT)
Dept: PRIMARY CARE CLINIC | Age: 40
End: 2025-06-30

## 2025-06-30 VITALS
OXYGEN SATURATION: 98 % | WEIGHT: 199.4 LBS | BODY MASS INDEX: 34.04 KG/M2 | DIASTOLIC BLOOD PRESSURE: 78 MMHG | TEMPERATURE: 97.9 F | SYSTOLIC BLOOD PRESSURE: 120 MMHG | HEIGHT: 64 IN | HEART RATE: 91 BPM

## 2025-06-30 DIAGNOSIS — E66.09 CLASS 1 OBESITY DUE TO EXCESS CALORIES WITHOUT SERIOUS COMORBIDITY WITH BODY MASS INDEX (BMI) OF 34.0 TO 34.9 IN ADULT: ICD-10-CM

## 2025-06-30 DIAGNOSIS — E55.9 VITAMIN D DEFICIENCY: ICD-10-CM

## 2025-06-30 DIAGNOSIS — E66.811 CLASS 1 OBESITY DUE TO EXCESS CALORIES WITHOUT SERIOUS COMORBIDITY WITH BODY MASS INDEX (BMI) OF 34.0 TO 34.9 IN ADULT: ICD-10-CM

## 2025-06-30 DIAGNOSIS — R53.83 FATIGUE, UNSPECIFIED TYPE: ICD-10-CM

## 2025-06-30 DIAGNOSIS — Z00.00 ENCOUNTER FOR ANNUAL PHYSICAL EXAM: ICD-10-CM

## 2025-06-30 DIAGNOSIS — Z00.00 ENCOUNTER FOR ANNUAL PHYSICAL EXAM: Primary | ICD-10-CM

## 2025-06-30 LAB
25(OH)D3 SERPL-MCNC: 44.7 NG/ML
ALBUMIN SERPL-MCNC: 4.4 G/DL (ref 3.5–5.2)
ALP SERPL-CCNC: 76 U/L (ref 35–104)
ALT SERPL-CCNC: 24 U/L (ref 10–35)
ANION GAP SERPL CALCULATED.3IONS-SCNC: 13 MMOL/L (ref 8–16)
AST SERPL-CCNC: 19 U/L (ref 10–35)
BASOPHILS # BLD: 0.1 K/UL (ref 0–0.2)
BASOPHILS NFR BLD: 1.1 % (ref 0–1)
BILIRUB SERPL-MCNC: 0.3 MG/DL (ref 0.2–1.2)
BUN SERPL-MCNC: 15 MG/DL (ref 6–20)
CALCIUM SERPL-MCNC: 9.9 MG/DL (ref 8.6–10)
CHLORIDE SERPL-SCNC: 101 MMOL/L (ref 98–107)
CHOLEST SERPL-MCNC: 227 MG/DL (ref 0–199)
CO2 SERPL-SCNC: 23 MMOL/L (ref 22–29)
CREAT SERPL-MCNC: 0.7 MG/DL (ref 0.5–0.9)
EOSINOPHIL # BLD: 0.1 K/UL (ref 0–0.6)
EOSINOPHIL NFR BLD: 1.4 % (ref 0–5)
ERYTHROCYTE [DISTWIDTH] IN BLOOD BY AUTOMATED COUNT: 12.6 % (ref 11.5–14.5)
GLUCOSE SERPL-MCNC: 77 MG/DL (ref 70–99)
HBA1C MFR BLD: 5.6 % (ref 4–5.6)
HCT VFR BLD AUTO: 42.6 % (ref 37–47)
HDLC SERPL-MCNC: 57 MG/DL (ref 40–60)
HGB BLD-MCNC: 14.4 G/DL (ref 12–16)
IMM GRANULOCYTES # BLD: 0 K/UL
LDLC SERPL CALC-MCNC: 155 MG/DL
LYMPHOCYTES # BLD: 1.9 K/UL (ref 1.1–4.5)
LYMPHOCYTES NFR BLD: 28.9 % (ref 20–40)
MCH RBC QN AUTO: 30.2 PG (ref 27–31)
MCHC RBC AUTO-ENTMCNC: 33.8 G/DL (ref 33–37)
MCV RBC AUTO: 89.3 FL (ref 81–99)
MONOCYTES # BLD: 0.5 K/UL (ref 0–0.9)
MONOCYTES NFR BLD: 8 % (ref 0–10)
NEUTROPHILS # BLD: 4 K/UL (ref 1.5–7.5)
NEUTS SEG NFR BLD: 60.4 % (ref 50–65)
PLATELET # BLD AUTO: 315 K/UL (ref 130–400)
PMV BLD AUTO: 9.5 FL (ref 9.4–12.3)
POTASSIUM SERPL-SCNC: 5 MMOL/L (ref 3.5–5.1)
PROT SERPL-MCNC: 7.7 G/DL (ref 6.4–8.3)
RBC # BLD AUTO: 4.77 M/UL (ref 4.2–5.4)
SODIUM SERPL-SCNC: 137 MMOL/L (ref 136–145)
TRIGL SERPL-MCNC: 77 MG/DL (ref 0–149)
TSH SERPL DL<=0.005 MIU/L-ACNC: 2.98 UIU/ML (ref 0.27–4.2)
WBC # BLD AUTO: 6.7 K/UL (ref 4.8–10.8)

## 2025-06-30 PROCEDURE — 99396 PREV VISIT EST AGE 40-64: CPT | Performed by: NURSE PRACTITIONER

## 2025-06-30 PROCEDURE — 99213 OFFICE O/P EST LOW 20 MIN: CPT | Performed by: NURSE PRACTITIONER

## 2025-06-30 RX ORDER — ERGOCALCIFEROL 1.25 MG/1
50000 CAPSULE, LIQUID FILLED ORAL WEEKLY
Qty: 12 CAPSULE | Refills: 3 | Status: SHIPPED | OUTPATIENT
Start: 2025-06-30

## 2025-06-30 ASSESSMENT — ENCOUNTER SYMPTOMS
COLOR CHANGE: 0
BACK PAIN: 0
VOICE CHANGE: 0
RHINORRHEA: 0
NAUSEA: 0
PHOTOPHOBIA: 0
COUGH: 0
VOMITING: 0
SHORTNESS OF BREATH: 0

## 2025-06-30 NOTE — PROGRESS NOTES
[facility]  [address]  [address]  [place] [address]  Dept: [phone]  Dept Fax: [phone]  Loc: [phone]    Opal Turcios is a 40 y.o. female who presents today for her medical conditions/complaints as noted below.  Opal Turcios is c/o of Bariatrics Post Op Follow Up (She got really sick with a stomach bug in May.  She has never been that sick.  She is still having dizzness and nausea.  )        HPI:     History of Present Illness  The patient presents for annual exam.    They underwent bariatric surgery and have been experiencing fainting episodes, particularly when their meals are delayed. They continue to consume small portions of food and maintain hydration by carrying water with them at all times.  They are currently taking a B vitamin complex supplement.    They have discontinued their cholesterol medication due to its unavailability and are attempting to manage their cholesterol levels through dietary changes.    They experienced an episode of vomiting and diarrhea that lasted for approximately 3 hours. During this episode, they had blue lips, were lying on the floor, and were repeatedly fainting. They suspect food poisoning as the cause of these symptoms, which have since subsided. They have not experienced any similar episodes since their surgery.    They are currently taking BuSpar 3 times a day, which is working well for them.      PAST SURGICAL HISTORY:  - Bariatric surgery  - Back surgery    SOCIAL HISTORY  They work from home as an [employer] .       Past Medical History:   Diagnosis Date    Anxiety     Chronic back pain     Hyperlipidemia     Neuropathy 2015    Ruptured discs in back. Weakness/numbness in rigt foot    Obesity     OCD (obsessive compulsive disorder)       Past Surgical History:   Procedure Laterality Date     SECTION      DILATION AND CURETTAGE OF UTERUS N/A 10/27/2023

## 2025-07-03 RX ORDER — SIMVASTATIN 10 MG
10 TABLET ORAL NIGHTLY
COMMUNITY
End: 2025-07-03 | Stop reason: SDUPTHER

## 2025-07-03 RX ORDER — SIMVASTATIN 10 MG
10 TABLET ORAL NIGHTLY
Qty: 90 TABLET | Refills: 1 | Status: SHIPPED | OUTPATIENT
Start: 2025-07-03

## 2025-07-03 NOTE — TELEPHONE ENCOUNTER
Anastasia restarted pt's simvastatin due to elevated lipids. Pt requested refill to be sent to [facility].

## 2025-07-29 ENCOUNTER — OFFICE VISIT (OUTPATIENT)
Age: 40
End: 2025-07-29

## 2025-07-29 ENCOUNTER — OFFICE VISIT (OUTPATIENT)
Dept: PRIMARY CARE CLINIC | Age: 40
End: 2025-07-29
Payer: COMMERCIAL

## 2025-07-29 VITALS
OXYGEN SATURATION: 98 % | DIASTOLIC BLOOD PRESSURE: 98 MMHG | HEART RATE: 98 BPM | WEIGHT: 200.1 LBS | HEIGHT: 64 IN | TEMPERATURE: 98 F | BODY MASS INDEX: 34.16 KG/M2 | SYSTOLIC BLOOD PRESSURE: 134 MMHG | RESPIRATION RATE: 20 BRPM

## 2025-07-29 VITALS
HEIGHT: 64 IN | HEART RATE: 80 BPM | DIASTOLIC BLOOD PRESSURE: 86 MMHG | WEIGHT: 199 LBS | BODY MASS INDEX: 33.97 KG/M2 | SYSTOLIC BLOOD PRESSURE: 132 MMHG | OXYGEN SATURATION: 98 % | TEMPERATURE: 98.1 F

## 2025-07-29 DIAGNOSIS — L72.3 INFECTED SEBACEOUS CYST: Primary | ICD-10-CM

## 2025-07-29 DIAGNOSIS — L08.9 INFECTED SEBACEOUS CYST: Primary | ICD-10-CM

## 2025-07-29 DIAGNOSIS — D23.5 DERMOID CYST OF SKIN OF BACK: Primary | ICD-10-CM

## 2025-07-29 PROCEDURE — 99213 OFFICE O/P EST LOW 20 MIN: CPT | Performed by: NURSE PRACTITIONER

## 2025-07-29 RX ORDER — CLINDAMYCIN HYDROCHLORIDE 300 MG/1
300 CAPSULE ORAL 4 TIMES DAILY
Qty: 40 CAPSULE | Refills: 0 | Status: SHIPPED | OUTPATIENT
Start: 2025-07-29

## 2025-07-29 ASSESSMENT — ENCOUNTER SYMPTOMS
COLOR CHANGE: 0
SHORTNESS OF BREATH: 0
VOMITING: 0
VOICE CHANGE: 0
NAUSEA: 0
COUGH: 0
PHOTOPHOBIA: 0
RHINORRHEA: 0
BACK PAIN: 0

## 2025-07-29 NOTE — PROGRESS NOTES
Opal Turcios (:  1985) is a 40 y.o. female,Established patient, here for evaluation of the following chief complaint(s):  Cyst (Started Tuesday last week. Pt states it is warm and painful to touch. Pt states it is not worsening but it is also not improving. Cyst is on the middle of her back on the bra line. )      Assessment & Plan :  Visit Diagnoses and Associated Orders         Infected sebaceous cyst    -  Primary                  Follow up on Friday  for excision sebaceous cyst       Subjective :  HPI     40 y.o. female presents with symptoms of an infected sebaceous cyst on her mid back but it is too inflamed to surgically remove. She states that she could not sleep on her back. It is along her bra line and this also irritates her          Vitals:    25 1616 25 1630   BP: (!) 146/106 (!) 134/98   Pulse: 98    Resp: 20    Temp: 98 °F (36.7 °C)    SpO2: 98%    Weight: 90.8 kg (200 lb 1.6 oz)    Height: 1.626 m (5' 4\")        No results found for this visit on 25.      Objective   Physical Exam  Vitals and nursing note reviewed.   Constitutional:       Appearance: Normal appearance.   Skin:     Findings: Lesion (inflamed sebaceous cyst  - mid back) present.   Neurological:      Mental Status: She is alert and oriented to person, place, and time.               An electronic signature was used to authenticate this note.    Nikole Valdes MD

## 2025-07-29 NOTE — PROGRESS NOTES
[facility]  [address]  [address]  [place] [address]  Dept: [phone]  Dept Fax: [phone]  Loc: [phone]    Opal Turcios is a 40 y.o. female who presents today for her medical conditions/complaints as noted below.  Opal Turcios is c/o of Cyst (On her back)        HPI:     History of Present Illness  The patient presents for a cyst.    They have been living with a cyst for several years in the center of her back, which recently became bright red and started to cause significant pain. The discomfort is so intense that it disrupts their sleep. They report that the cyst was red, swollen, soft, and warm to touch for about 3 to 4 days. However, they noticed a change in its consistency from soft to hard as the redness and warmth subsided. They also mention that the cyst is located in an inconvenient spot, making it difficult for them to reach.        Past Medical History:   Diagnosis Date    Anxiety     Chronic back pain     Hyperlipidemia     Neuropathy     Ruptured discs in back. Weakness/numbness in rigt foot    Obesity     OCD (obsessive compulsive disorder)       Past Surgical History:   Procedure Laterality Date     SECTION      DILATION AND CURETTAGE OF UTERUS N/A 10/27/2023    HYSTEROSCOPY, ENDOMETRIAL ABLATION WITH NOVOSURE, DILATATION AND CURETTAGE performed by Kendra Patel MD at [facility] OR    HERNIA REPAIR      LUMBAR DISCECTOMY      LUMBAR LAMINECTOMY             2025    12:03 PM 2025     9:26 AM 2024    10:01 AM 2024    10:46 AM 2024     8:22 AM 10/27/2023    10:42 AM   Vitals   SYSTOLIC 132 120  134 130 134   DIASTOLIC 86 78  76 84 83   BP Site     Left Upper Arm    Patient Position     Sitting    BP Cuff Size     Large Adult    Pulse 80 91  76 87 69   Temp 98.1 °F (36.7 °C) 97.9 °F (36.6 °C)  97.8 °F (36.6 °C) 97.8 °F (36.6 °C) 96.9 °F (36.1 °C)   Respirations      16   SpO2 98 % 98 %

## 2025-08-01 ENCOUNTER — OFFICE VISIT (OUTPATIENT)
Age: 40
End: 2025-08-01

## 2025-08-01 VITALS
HEART RATE: 86 BPM | SYSTOLIC BLOOD PRESSURE: 128 MMHG | HEIGHT: 65 IN | RESPIRATION RATE: 20 BRPM | TEMPERATURE: 98.2 F | BODY MASS INDEX: 33.32 KG/M2 | OXYGEN SATURATION: 98 % | DIASTOLIC BLOOD PRESSURE: 74 MMHG | WEIGHT: 200 LBS

## 2025-08-01 DIAGNOSIS — L72.3 SEBACEOUS CYST: Primary | ICD-10-CM

## 2025-08-01 RX ORDER — IBUPROFEN 800 MG/1
800 TABLET, FILM COATED ORAL 2 TIMES DAILY PRN
Qty: 20 TABLET | Refills: 0 | Status: SHIPPED | OUTPATIENT
Start: 2025-08-01

## 2025-08-01 NOTE — PROGRESS NOTES
Opal Turcios (:  1985) is a 40 y.o. female,Established patient, here for evaluation of the following chief complaint(s):  Cyst (Pt here for follow up on a cyst.  Midline back. Pt repots cyst is improving. )      Assessment & Plan :  Visit Diagnoses and Associated Orders         Sebaceous cyst    -  Primary                         Subjective :  HPI     40 y.o. female presents for excision of sebaceous cyst.          Vitals:    25 1511   BP: 128/74   Pulse: 86   Resp: 20   Temp: 98.2 °F (36.8 °C)   TempSrc: Temporal   SpO2: 98%   Weight: 90.7 kg (200 lb)   Height: 1.651 m (5' 5\")       No results found for this visit on 25.      Objective   Physical Exam  Vitals reviewed.   Constitutional:       Appearance: Normal appearance.   Skin:     Findings: Lesion (back - sebaceous cyst) present.   Neurological:      Mental Status: She is alert.          Procedure Note: Excision of Sebaceous Cyst (back)  Informed consent signed. The area was prepped in a sterile fashion. 4 ml Lidocaine with Epi was used to anesthetize the area. After anesthesia was achieved, a vertical incision was made using #11 blade. The cyst sac was carefully excised. Unfortunately, the contents of the cyst spilled over. Pus mixed with cheesy material was extruded. After the sac was removed, the wound was irrigated with sterile saline. 3'0 vicryl was used to close the wound with 4 sutures. Triple abx was applied to the area, covered with Tegaderm  and gauze.   Home wound care instructions given. Sutures to be removed in 10 - 14 days       An electronic signature was used to authenticate this note.    Nikole Valdes MD

## (undated) DEVICE — THE CHANNEL CLEANING BRUSH IS A NYLON FLEXI BRUSH ATTACHED TO A FLEXIBLE PLASTIC SHEATH DESIGNED TO SAFELY REMOVE DEBRIS FROM FLEXIBLE ENDOSCOPES.

## (undated) DEVICE — BANDAGE,GAUZE,BULKEE II,4.5"X4.1YD,STRL: Brand: MEDLINE

## (undated) DEVICE — VESSEL SEALER EXTEND: Brand: ENDOWRIST

## (undated) DEVICE — VISIGI 3D®  CALIBRATION SYSTEM  SIZE 40FR STD W/ BULB: Brand: BOEHRINGER® VISIGI 3D™ SLEEVE GASTRECTOMY CALIBRATION SYSTEM, SIZE 40FR W/BULB

## (undated) DEVICE — SINGLE PORT MANIFOLD: Brand: NEPTUNE 2

## (undated) DEVICE — BLADELESS OBTURATOR: Brand: WECK VISTA

## (undated) DEVICE — FLUID MGMT SYS FLUENT KIT 6/PK

## (undated) DEVICE — ADHS SKIN PREMIERPRO EXOFIN TOPICAL HI/VISC .5ML

## (undated) DEVICE — TROC BLADLES ANCHORPORT/OPTI LP 8X120MM 1P/U

## (undated) DEVICE — SYS CLOSE PORTII CARTR/THOMASN XL

## (undated) DEVICE — ENDOGATOR AUXILIARY WATER JET CONNECTOR: Brand: ENDOGATOR

## (undated) DEVICE — GOWN,PREVENTION PLUS,XLN/2XL,ST,22/CS: Brand: MEDLINE

## (undated) DEVICE — SUT VIC 3/0 SH 36IN VCP527H

## (undated) DEVICE — ARM DRAPE

## (undated) DEVICE — SURGICAL PROCEDURE PACK GYNECOLOGIC MIN LOURDES HOSP

## (undated) DEVICE — ENDOPATH XCEL WITH OPTIVIEW TECHNOLOGY BLADELESS TROCARS WITH STABILITY SLEEVES: Brand: ENDOPATH XCEL OPTIVIEW

## (undated) DEVICE — PAD,NON-ADHERENT,3X8,STERILE,LF,1/PK: Brand: MEDLINE

## (undated) DEVICE — ST TBG AIRSEAL FLTR TRI LUM

## (undated) DEVICE — DEVICE TISS REM L32CM DIA3MM S STL ULT HRD HI WR RESISTANCE

## (undated) DEVICE — DEVICE ABLATN ENDOMET US NOVASURE V5

## (undated) DEVICE — DAVINCI: Brand: MEDLINE INDUSTRIES, INC.

## (undated) DEVICE — SOLUTION IRRIG 3000ML 0.9% SOD CHL USP UROMATIC PLAS CONT

## (undated) DEVICE — GLV SURG SENSICARE W/ALOE PF LF 8 STRL

## (undated) DEVICE — GLOVE SURG SZ 75 CRM LTX FREE POLYISOPRENE POLYMER BEAD ANTI

## (undated) DEVICE — CUFF,BP,DISP,1 TUBE,ADULT,HP: Brand: MEDLINE

## (undated) DEVICE — SYR LL TP 10ML STRL

## (undated) DEVICE — SEAL

## (undated) DEVICE — CONMED SCOPE SAVER BITE BLOCK, 20X27 MM: Brand: SCOPE SAVER

## (undated) DEVICE — FRCP BX RADJAW4 NDL 2.8 240 STD OG

## (undated) DEVICE — SHET AIR RENEWAL COMFRT GLID BLEND/AIR LAT REPROC 40X80IN

## (undated) DEVICE — BNDR ABD 4PANEL 12IN 63 TO 74IN

## (undated) DEVICE — SET ENDOSCP SEAL HYSTEROSCOPE RIG OUTFLO CHN DISP MYOSURE

## (undated) DEVICE — KT CLN CLEANOR SCPE

## (undated) DEVICE — NEEDLE,22GX1.5",REG,BEVEL: Brand: MEDLINE

## (undated) DEVICE — Device: Brand: DEFENDO AIR/WATER/SUCTION AND BIOPSY VALVE

## (undated) DEVICE — SUREFIT, DUAL DISPERSIVE ELECTRODE, CONTACT QUALITY MONITOR: Brand: SUREFIT

## (undated) DEVICE — REDUCER: Brand: ENDOWRIST

## (undated) DEVICE — SUT MNCRYL 4/0 PS2 27IN UD MCP426H

## (undated) DEVICE — SUT VIC 0 SUTUPAK TIES 18IN J906G

## (undated) DEVICE — STAPLER 60: Brand: SUREFORM

## (undated) DEVICE — SENSR O2 OXIMAX FNGR A/ 18IN NONSTR